# Patient Record
Sex: FEMALE | Race: WHITE | NOT HISPANIC OR LATINO | Employment: UNEMPLOYED | RURAL
[De-identification: names, ages, dates, MRNs, and addresses within clinical notes are randomized per-mention and may not be internally consistent; named-entity substitution may affect disease eponyms.]

---

## 2017-05-15 ENCOUNTER — HISTORICAL (OUTPATIENT)
Dept: ADMINISTRATIVE | Facility: HOSPITAL | Age: 22
End: 2017-05-15

## 2017-05-17 LAB
LAB AP GENERAL CAT - HISTORICAL: NORMAL
LAB AP INTERPRETATION/RESULT - HISTORICAL: NORMAL
LAB AP SPECIMEN ADEQUACY - HISTORICAL: NORMAL
LAB AP SPECIMEN SUBMITTED - HISTORICAL: NORMAL

## 2018-09-04 ENCOUNTER — HISTORICAL (OUTPATIENT)
Dept: ADMINISTRATIVE | Facility: HOSPITAL | Age: 23
End: 2018-09-04

## 2018-09-07 LAB
LAB AP GENERAL CAT - HISTORICAL: NORMAL
LAB AP INTERPRETATION/RESULT - HISTORICAL: NEGATIVE
LAB AP SPECIMEN ADEQUACY - HISTORICAL: NORMAL
LAB AP SPECIMEN SUBMITTED - HISTORICAL: NORMAL

## 2019-09-26 ENCOUNTER — HISTORICAL (OUTPATIENT)
Dept: ADMINISTRATIVE | Facility: HOSPITAL | Age: 24
End: 2019-09-26

## 2019-09-30 LAB
LAB AP CLINICAL INFORMATION: NORMAL
LAB AP COMMENTS: NORMAL
LAB AP GENERAL CAT - HISTORICAL: NORMAL
LAB AP INTERPRETATION/RESULT - HISTORICAL: NORMAL
LAB AP SPECIMEN ADEQUACY - HISTORICAL: NORMAL
LAB AP SPECIMEN SUBMITTED - HISTORICAL: NORMAL

## 2020-03-31 ENCOUNTER — HISTORICAL (OUTPATIENT)
Dept: ADMINISTRATIVE | Facility: HOSPITAL | Age: 25
End: 2020-03-31

## 2020-04-01 ENCOUNTER — HISTORICAL (OUTPATIENT)
Dept: ADMINISTRATIVE | Facility: HOSPITAL | Age: 25
End: 2020-04-01

## 2020-10-14 ENCOUNTER — HISTORICAL (OUTPATIENT)
Dept: ADMINISTRATIVE | Facility: HOSPITAL | Age: 25
End: 2020-10-14

## 2020-10-19 LAB
LAB AP CLINICAL INFORMATION: NORMAL
LAB AP GENERAL CAT - HISTORICAL: NORMAL
LAB AP INTERPRETATION/RESULT - HISTORICAL: NORMAL
LAB AP SPECIMEN ADEQUACY - HISTORICAL: NORMAL
LAB AP SPECIMEN SUBMITTED - HISTORICAL: NORMAL

## 2021-04-01 RX ORDER — NORELGESTROMIN AND ETHINYL ESTRADIOL 150; 35 UG/D; UG/D
1 PATCH TRANSDERMAL WEEKLY
COMMUNITY
Start: 2021-03-08 | End: 2021-04-08 | Stop reason: SDUPTHER

## 2021-04-01 RX ORDER — SERTRALINE HYDROCHLORIDE 100 MG/1
100 TABLET, FILM COATED ORAL DAILY
COMMUNITY
End: 2021-12-27 | Stop reason: SDUPTHER

## 2021-04-01 RX ORDER — TRAMADOL HYDROCHLORIDE 50 MG/1
50 TABLET ORAL EVERY 6 HOURS PRN
Status: ON HOLD | COMMUNITY
End: 2022-02-22 | Stop reason: CLARIF

## 2021-04-08 ENCOUNTER — HISTORICAL (OUTPATIENT)
Dept: ADMINISTRATIVE | Facility: HOSPITAL | Age: 26
End: 2021-04-08

## 2021-04-08 ENCOUNTER — OFFICE VISIT (OUTPATIENT)
Dept: OBSTETRICS AND GYNECOLOGY | Facility: CLINIC | Age: 26
End: 2021-04-08
Payer: MEDICAID

## 2021-04-08 VITALS
HEIGHT: 64 IN | TEMPERATURE: 98 F | SYSTOLIC BLOOD PRESSURE: 113 MMHG | BODY MASS INDEX: 22.79 KG/M2 | DIASTOLIC BLOOD PRESSURE: 86 MMHG | RESPIRATION RATE: 18 BRPM | WEIGHT: 133.5 LBS | HEART RATE: 91 BPM

## 2021-04-08 DIAGNOSIS — Z12.4 SCREENING FOR MALIGNANT NEOPLASM OF THE CERVIX: ICD-10-CM

## 2021-04-08 DIAGNOSIS — Z01.419 ENCOUNTER FOR WELL WOMAN EXAM WITH ROUTINE GYNECOLOGICAL EXAM: Primary | ICD-10-CM

## 2021-04-08 PROCEDURE — 88141 CYTOPATH C/V INTERPRET: CPT | Mod: ,,, | Performed by: PATHOLOGY

## 2021-04-08 PROCEDURE — 99213 OFFICE O/P EST LOW 20 MIN: CPT | Mod: PBBFAC | Performed by: OBSTETRICS & GYNECOLOGY

## 2021-04-08 PROCEDURE — 99395 PREV VISIT EST AGE 18-39: CPT | Mod: S$PBB,,, | Performed by: OBSTETRICS & GYNECOLOGY

## 2021-04-08 PROCEDURE — 99999 PR PBB SHADOW E&M-EST. PATIENT-LVL III: ICD-10-PCS | Mod: PBBFAC,,, | Performed by: OBSTETRICS & GYNECOLOGY

## 2021-04-08 PROCEDURE — 88142 CYTOPATH C/V THIN LAYER: CPT | Performed by: PATHOLOGY

## 2021-04-08 PROCEDURE — 88141 PR  CYTOPATH CERV/VAG INTERPRET: ICD-10-PCS | Mod: ,,, | Performed by: PATHOLOGY

## 2021-04-08 PROCEDURE — 99395 PR PREVENTIVE VISIT,EST,18-39: ICD-10-PCS | Mod: S$PBB,,, | Performed by: OBSTETRICS & GYNECOLOGY

## 2021-04-08 PROCEDURE — 99999 PR PBB SHADOW E&M-EST. PATIENT-LVL III: CPT | Mod: PBBFAC,,, | Performed by: OBSTETRICS & GYNECOLOGY

## 2021-04-08 RX ORDER — NORELGESTROMIN AND ETHINYL ESTRADIOL 150; 35 UG/D; UG/D
1 PATCH TRANSDERMAL WEEKLY
Qty: 3 PATCH | Refills: 11 | Status: SHIPPED | OUTPATIENT
Start: 2021-04-08 | End: 2021-10-28

## 2021-04-21 LAB
HPV 16: NEGATIVE
HPV 18: NEGATIVE
HPV OTHER: POSITIVE
INSULIN SERPL-ACNC: ABNORMAL U[IU]/ML

## 2021-04-22 ENCOUNTER — TELEPHONE (OUTPATIENT)
Dept: OBSTETRICS AND GYNECOLOGY | Facility: CLINIC | Age: 26
End: 2021-04-22

## 2021-07-15 ENCOUNTER — OFFICE VISIT (OUTPATIENT)
Dept: OBSTETRICS AND GYNECOLOGY | Facility: CLINIC | Age: 26
End: 2021-07-15
Payer: MEDICAID

## 2021-07-15 DIAGNOSIS — R39.89 BLADDER PAIN: ICD-10-CM

## 2021-07-15 DIAGNOSIS — R10.2 PELVIC PAIN IN FEMALE: ICD-10-CM

## 2021-07-15 PROCEDURE — 99213 OFFICE O/P EST LOW 20 MIN: CPT | Mod: S$PBB,,, | Performed by: OBSTETRICS & GYNECOLOGY

## 2021-07-15 PROCEDURE — 99212 OFFICE O/P EST SF 10 MIN: CPT | Mod: PBBFAC | Performed by: OBSTETRICS & GYNECOLOGY

## 2021-07-15 PROCEDURE — 99213 PR OFFICE/OUTPT VISIT, EST, LEVL III, 20-29 MIN: ICD-10-PCS | Mod: S$PBB,,, | Performed by: OBSTETRICS & GYNECOLOGY

## 2021-07-15 RX ORDER — NITROFURANTOIN 25; 75 MG/1; MG/1
100 CAPSULE ORAL 2 TIMES DAILY
Qty: 14 CAPSULE | Refills: 0 | Status: SHIPPED | OUTPATIENT
Start: 2021-07-15 | End: 2021-07-22

## 2021-07-15 RX ORDER — PHENAZOPYRIDINE HYDROCHLORIDE 200 MG/1
200 TABLET, FILM COATED ORAL 3 TIMES DAILY PRN
Qty: 20 TABLET | Refills: 0 | Status: ON HOLD | OUTPATIENT
Start: 2021-07-15 | End: 2022-02-22 | Stop reason: CLARIF

## 2021-08-02 ENCOUNTER — OFFICE VISIT (OUTPATIENT)
Dept: FAMILY MEDICINE | Facility: CLINIC | Age: 26
End: 2021-08-02
Payer: MEDICAID

## 2021-08-02 VITALS
TEMPERATURE: 100 F | SYSTOLIC BLOOD PRESSURE: 97 MMHG | DIASTOLIC BLOOD PRESSURE: 60 MMHG | WEIGHT: 133 LBS | RESPIRATION RATE: 16 BRPM | BODY MASS INDEX: 22.71 KG/M2 | OXYGEN SATURATION: 98 % | HEART RATE: 89 BPM | HEIGHT: 64 IN

## 2021-08-02 DIAGNOSIS — R05.9 COUGH: ICD-10-CM

## 2021-08-02 DIAGNOSIS — Z20.822 CONTACT WITH AND (SUSPECTED) EXPOSURE TO COVID-19: ICD-10-CM

## 2021-08-02 DIAGNOSIS — R50.9 FEVER, UNSPECIFIED FEVER CAUSE: ICD-10-CM

## 2021-08-02 DIAGNOSIS — J02.9 SORE THROAT: ICD-10-CM

## 2021-08-02 DIAGNOSIS — U07.1 COVID-19: Primary | ICD-10-CM

## 2021-08-02 LAB
CTP QC/QA: YES
CTP QC/QA: YES
FLUAV AG NPH QL: NEGATIVE
FLUBV AG NPH QL: NEGATIVE
S PYO RRNA THROAT QL PROBE: NEGATIVE
SARS-COV-2 AG RESP QL IA.RAPID: POSITIVE

## 2021-08-02 PROCEDURE — 87880 STREP A ASSAY W/OPTIC: CPT | Mod: RHCUB | Performed by: FAMILY MEDICINE

## 2021-08-02 PROCEDURE — 87428 SARSCOV & INF VIR A&B AG IA: CPT | Mod: RHCUB | Performed by: FAMILY MEDICINE

## 2021-08-02 PROCEDURE — 99213 OFFICE O/P EST LOW 20 MIN: CPT | Mod: ,,, | Performed by: FAMILY MEDICINE

## 2021-08-02 PROCEDURE — 99213 PR OFFICE/OUTPT VISIT, EST, LEVL III, 20-29 MIN: ICD-10-PCS | Mod: ,,, | Performed by: FAMILY MEDICINE

## 2021-08-02 RX ORDER — AZITHROMYCIN 250 MG/1
250 TABLET, FILM COATED ORAL DAILY
COMMUNITY
End: 2021-08-02 | Stop reason: SDUPTHER

## 2021-08-02 RX ORDER — AZITHROMYCIN 250 MG/1
TABLET, FILM COATED ORAL
Qty: 6 TABLET | Refills: 0 | Status: CANCELLED | OUTPATIENT
Start: 2021-08-02 | End: 2021-08-07

## 2021-08-03 RX ORDER — AZITHROMYCIN 250 MG/1
250 TABLET, FILM COATED ORAL DAILY
Qty: 6 TABLET | Refills: 0 | Status: ON HOLD | OUTPATIENT
Start: 2021-08-03 | End: 2022-02-22 | Stop reason: CLARIF

## 2021-08-16 ENCOUNTER — TELEPHONE (OUTPATIENT)
Dept: FAMILY MEDICINE | Facility: CLINIC | Age: 26
End: 2021-08-16

## 2021-10-28 ENCOUNTER — OFFICE VISIT (OUTPATIENT)
Dept: OBSTETRICS AND GYNECOLOGY | Facility: CLINIC | Age: 26
End: 2021-10-28
Payer: MEDICAID

## 2021-10-28 VITALS
DIASTOLIC BLOOD PRESSURE: 78 MMHG | TEMPERATURE: 98 F | BODY MASS INDEX: 23.9 KG/M2 | SYSTOLIC BLOOD PRESSURE: 118 MMHG | HEIGHT: 64 IN | WEIGHT: 140 LBS

## 2021-10-28 DIAGNOSIS — N92.1 MENOMETRORRHAGIA: ICD-10-CM

## 2021-10-28 DIAGNOSIS — N86 CERVICAL EROSION/ECTROPION: ICD-10-CM

## 2021-10-28 DIAGNOSIS — R10.2 PELVIC PAIN IN FEMALE: Primary | ICD-10-CM

## 2021-10-28 DIAGNOSIS — N93.0 PCB (POST COITAL BLEEDING): Primary | ICD-10-CM

## 2021-10-28 DIAGNOSIS — N93.9 EPISODE OF HEAVY VAGINAL BLEEDING: ICD-10-CM

## 2021-10-28 PROCEDURE — 88305 TISSUE EXAM BY PATHOLOGIST: CPT | Mod: 26,XU,, | Performed by: PATHOLOGY

## 2021-10-28 PROCEDURE — 58100 BIOPSY OF UTERUS LINING: CPT | Mod: PBBFAC | Performed by: OBSTETRICS & GYNECOLOGY

## 2021-10-28 PROCEDURE — 99213 OFFICE O/P EST LOW 20 MIN: CPT | Mod: S$PBB,25,, | Performed by: OBSTETRICS & GYNECOLOGY

## 2021-10-28 PROCEDURE — 58100 PR BIOPSY OF UTERUS LINING: ICD-10-PCS | Mod: S$PBB,,, | Performed by: OBSTETRICS & GYNECOLOGY

## 2021-10-28 PROCEDURE — 88305 SURGICAL PATHOLOGY: ICD-10-PCS | Mod: 26,,, | Performed by: PATHOLOGY

## 2021-10-28 PROCEDURE — 88305 TISSUE EXAM BY PATHOLOGIST: CPT | Mod: SUR | Performed by: OBSTETRICS & GYNECOLOGY

## 2021-10-28 PROCEDURE — 58100 BIOPSY OF UTERUS LINING: CPT | Mod: S$PBB,,, | Performed by: OBSTETRICS & GYNECOLOGY

## 2021-10-28 PROCEDURE — 57500 BIOPSY OF CERVIX: CPT | Mod: PBBFAC | Performed by: OBSTETRICS & GYNECOLOGY

## 2021-10-28 PROCEDURE — 88342 SURGICAL PATHOLOGY: ICD-10-PCS | Mod: 26,,, | Performed by: PATHOLOGY

## 2021-10-28 PROCEDURE — 99213 OFFICE O/P EST LOW 20 MIN: CPT | Mod: PBBFAC,25 | Performed by: OBSTETRICS & GYNECOLOGY

## 2021-10-28 PROCEDURE — 88342 IMHCHEM/IMCYTCHM 1ST ANTB: CPT | Mod: 26,,, | Performed by: PATHOLOGY

## 2021-10-28 PROCEDURE — 99213 PR OFFICE/OUTPT VISIT, EST, LEVL III, 20-29 MIN: ICD-10-PCS | Mod: S$PBB,25,, | Performed by: OBSTETRICS & GYNECOLOGY

## 2021-11-01 LAB
DHEA SERPL-MCNC: NORMAL
DHEA SERPL-MCNC: NORMAL
ESTROGEN SERPL-MCNC: NORMAL PG/ML
ESTROGEN SERPL-MCNC: NORMAL PG/ML
LAB AP CLINICAL INFORMATION: NORMAL
LAB AP CLINICAL INFORMATION: NORMAL
LAB AP GROSS DESCRIPTION: NORMAL
LAB AP GROSS DESCRIPTION: NORMAL
LAB AP LABORATORY NOTES: NORMAL
LAB AP LABORATORY NOTES: NORMAL
T3RU NFR SERPL: NORMAL %
T3RU NFR SERPL: NORMAL %

## 2021-11-04 ENCOUNTER — OFFICE VISIT (OUTPATIENT)
Dept: OBSTETRICS AND GYNECOLOGY | Facility: CLINIC | Age: 26
End: 2021-11-04
Payer: MEDICAID

## 2021-11-04 VITALS
WEIGHT: 141 LBS | DIASTOLIC BLOOD PRESSURE: 80 MMHG | HEIGHT: 64 IN | TEMPERATURE: 98 F | BODY MASS INDEX: 24.07 KG/M2 | SYSTOLIC BLOOD PRESSURE: 118 MMHG

## 2021-11-04 DIAGNOSIS — N86 ECTROPION OF CERVIX: ICD-10-CM

## 2021-11-04 DIAGNOSIS — N93.0 POSTCOITAL AND CONTACT BLEEDING: Primary | ICD-10-CM

## 2021-11-04 DIAGNOSIS — Z09 FOLLOW-UP VISIT AFTER COMPLETION OF TREATMENT: ICD-10-CM

## 2021-11-04 PROCEDURE — 99213 OFFICE O/P EST LOW 20 MIN: CPT | Mod: PBBFAC | Performed by: OBSTETRICS & GYNECOLOGY

## 2021-11-04 PROCEDURE — 99212 PR OFFICE/OUTPT VISIT, EST, LEVL II, 10-19 MIN: ICD-10-PCS | Mod: S$PBB,,, | Performed by: OBSTETRICS & GYNECOLOGY

## 2021-11-04 PROCEDURE — 99212 OFFICE O/P EST SF 10 MIN: CPT | Mod: S$PBB,,, | Performed by: OBSTETRICS & GYNECOLOGY

## 2021-12-22 RX ORDER — PROGESTERONE 200 MG/1
200 CAPSULE ORAL NIGHTLY
Qty: 12 CAPSULE | Refills: 2 | Status: ON HOLD | OUTPATIENT
Start: 2021-12-22 | End: 2022-02-22 | Stop reason: CLARIF

## 2021-12-27 RX ORDER — SERTRALINE HYDROCHLORIDE 100 MG/1
100 TABLET, FILM COATED ORAL DAILY
Qty: 90 TABLET | Refills: 3 | Status: ON HOLD | OUTPATIENT
Start: 2021-12-27 | End: 2022-09-13 | Stop reason: HOSPADM

## 2022-01-23 ENCOUNTER — HOSPITAL ENCOUNTER (EMERGENCY)
Facility: HOSPITAL | Age: 27
Discharge: HOME | End: 2022-01-23
Payer: MEDICAID

## 2022-01-23 VITALS
HEART RATE: 87 BPM | BODY MASS INDEX: 25.1 KG/M2 | SYSTOLIC BLOOD PRESSURE: 115 MMHG | OXYGEN SATURATION: 99 % | RESPIRATION RATE: 20 BRPM | HEIGHT: 64 IN | WEIGHT: 147 LBS | DIASTOLIC BLOOD PRESSURE: 58 MMHG | TEMPERATURE: 98 F

## 2022-01-23 DIAGNOSIS — N93.8 DYSFUNCTIONAL UTERINE BLEEDING: Primary | ICD-10-CM

## 2022-01-23 DIAGNOSIS — N96 HISTORY OF RECURRENT MISCARRIAGES: ICD-10-CM

## 2022-01-23 LAB
ANION GAP SERPL CALCULATED.3IONS-SCNC: 16 MMOL/L (ref 7–16)
B-HCG UR QL: NEGATIVE
BACTERIA #/AREA URNS HPF: ABNORMAL /HPF
BASOPHILS # BLD AUTO: 0.02 K/UL (ref 0–0.2)
BASOPHILS NFR BLD AUTO: 0.2 % (ref 0–1)
BILIRUB UR QL STRIP: NEGATIVE
BUN SERPL-MCNC: 11 MG/DL (ref 7–18)
BUN/CREAT SERPL: 13 (ref 6–20)
CALCIUM SERPL-MCNC: 8.5 MG/DL (ref 8.5–10.1)
CHLORIDE SERPL-SCNC: 108 MMOL/L (ref 98–107)
CLARITY UR: ABNORMAL
CO2 SERPL-SCNC: 25 MMOL/L (ref 21–32)
COLOR UR: ABNORMAL
CREAT SERPL-MCNC: 0.87 MG/DL (ref 0.55–1.02)
CTP QC/QA: YES
DIFFERENTIAL METHOD BLD: ABNORMAL
EOSINOPHIL # BLD AUTO: 0.11 K/UL (ref 0–0.5)
EOSINOPHIL NFR BLD AUTO: 1.3 % (ref 1–4)
EOSINOPHIL NFR BLD MANUAL: 2 % (ref 1–4)
ERYTHROCYTE [DISTWIDTH] IN BLOOD BY AUTOMATED COUNT: 11.8 % (ref 11.5–14.5)
GLUCOSE SERPL-MCNC: 99 MG/DL (ref 74–106)
GLUCOSE UR STRIP-MCNC: NEGATIVE MG/DL
HCG SERPL-ACNC: <1 MIU/ML
HCT VFR BLD AUTO: 39.6 % (ref 38–47)
HGB BLD-MCNC: 13.2 G/DL (ref 12–16)
IMM GRANULOCYTES # BLD AUTO: 0.03 K/UL (ref 0–0.04)
IMM GRANULOCYTES NFR BLD: 0.3 % (ref 0–0.4)
KETONES UR STRIP-SCNC: NEGATIVE MG/DL
LEUKOCYTE ESTERASE UR QL STRIP: NEGATIVE
LYMPHOCYTES # BLD AUTO: 0.45 K/UL (ref 1–4.8)
LYMPHOCYTES NFR BLD AUTO: 5.2 % (ref 27–41)
LYMPHOCYTES NFR BLD MANUAL: 6 % (ref 27–41)
MCH RBC QN AUTO: 29.9 PG (ref 27–31)
MCHC RBC AUTO-ENTMCNC: 33.3 G/DL (ref 32–36)
MCV RBC AUTO: 89.8 FL (ref 80–96)
MONOCYTES # BLD AUTO: 0.28 K/UL (ref 0–0.8)
MONOCYTES NFR BLD AUTO: 3.2 % (ref 2–6)
MONOCYTES NFR BLD MANUAL: 4 % (ref 2–6)
MPC BLD CALC-MCNC: 10.1 FL (ref 9.4–12.4)
MUCOUS THREADS #/AREA URNS HPF: ABNORMAL /HPF
NEUTROPHILS # BLD AUTO: 7.73 K/UL (ref 1.8–7.7)
NEUTROPHILS NFR BLD AUTO: 89.8 % (ref 53–65)
NEUTS BAND NFR BLD MANUAL: 8 % (ref 1–5)
NEUTS SEG NFR BLD MANUAL: 80 % (ref 50–62)
NITRITE UR QL STRIP: NEGATIVE
NRBC # BLD AUTO: 0 X10E3/UL
NRBC, AUTO (.00): 0 %
PH UR STRIP: 6 PH UNITS
PLATELET # BLD AUTO: 213 K/UL (ref 150–400)
PLATELET MORPHOLOGY: NORMAL
POTASSIUM SERPL-SCNC: 3.9 MMOL/L (ref 3.5–5.1)
PROT UR QL STRIP: NEGATIVE
RBC # BLD AUTO: 4.41 M/UL (ref 4.2–5.4)
RBC # UR STRIP: ABNORMAL /UL
RBC #/AREA URNS HPF: ABNORMAL /HPF
RBC MORPH BLD: NORMAL
SODIUM SERPL-SCNC: 145 MMOL/L (ref 136–145)
SP GR UR STRIP: >=1.03
SQUAMOUS #/AREA URNS LPF: ABNORMAL /LPF
UROBILINOGEN UR STRIP-ACNC: 0.2 MG/DL
WBC # BLD AUTO: 8.62 K/UL (ref 4.5–11)
WBC #/AREA URNS HPF: ABNORMAL /HPF

## 2022-01-23 PROCEDURE — 36415 COLL VENOUS BLD VENIPUNCTURE: CPT | Performed by: NURSE PRACTITIONER

## 2022-01-23 PROCEDURE — 96375 TX/PRO/DX INJ NEW DRUG ADDON: CPT

## 2022-01-23 PROCEDURE — 96361 HYDRATE IV INFUSION ADD-ON: CPT

## 2022-01-23 PROCEDURE — 99283 EMERGENCY DEPT VISIT LOW MDM: CPT | Mod: ,,, | Performed by: NURSE PRACTITIONER

## 2022-01-23 PROCEDURE — 84702 CHORIONIC GONADOTROPIN TEST: CPT | Performed by: NURSE PRACTITIONER

## 2022-01-23 PROCEDURE — 63600175 PHARM REV CODE 636 W HCPCS: Performed by: NURSE PRACTITIONER

## 2022-01-23 PROCEDURE — 99283 PR EMERGENCY DEPT VISIT,LEVEL III: ICD-10-PCS | Mod: ,,, | Performed by: NURSE PRACTITIONER

## 2022-01-23 PROCEDURE — 25000003 PHARM REV CODE 250: Performed by: NURSE PRACTITIONER

## 2022-01-23 PROCEDURE — 96374 THER/PROPH/DIAG INJ IV PUSH: CPT

## 2022-01-23 PROCEDURE — 85025 COMPLETE CBC W/AUTO DIFF WBC: CPT | Performed by: NURSE PRACTITIONER

## 2022-01-23 PROCEDURE — 81025 URINE PREGNANCY TEST: CPT | Performed by: NURSE PRACTITIONER

## 2022-01-23 PROCEDURE — 81001 URINALYSIS AUTO W/SCOPE: CPT | Performed by: NURSE PRACTITIONER

## 2022-01-23 PROCEDURE — 80048 BASIC METABOLIC PNL TOTAL CA: CPT | Performed by: NURSE PRACTITIONER

## 2022-01-23 PROCEDURE — 99284 EMERGENCY DEPT VISIT MOD MDM: CPT | Mod: 25

## 2022-01-23 RX ORDER — ONDANSETRON 2 MG/ML
4 INJECTION INTRAMUSCULAR; INTRAVENOUS
Status: COMPLETED | OUTPATIENT
Start: 2022-01-23 | End: 2022-01-23

## 2022-01-23 RX ORDER — IBUPROFEN 800 MG/1
800 TABLET ORAL 3 TIMES DAILY
Qty: 20 TABLET | Refills: 0 | Status: ON HOLD | OUTPATIENT
Start: 2022-01-23 | End: 2022-02-22 | Stop reason: CLARIF

## 2022-01-23 RX ORDER — KETOROLAC TROMETHAMINE 30 MG/ML
30 INJECTION, SOLUTION INTRAMUSCULAR; INTRAVENOUS
Status: COMPLETED | OUTPATIENT
Start: 2022-01-23 | End: 2022-01-23

## 2022-01-23 RX ADMIN — ONDANSETRON 4 MG: 2 INJECTION INTRAMUSCULAR; INTRAVENOUS at 12:01

## 2022-01-23 RX ADMIN — KETOROLAC TROMETHAMINE 30 MG: 30 INJECTION, SOLUTION INTRAMUSCULAR at 01:01

## 2022-01-23 RX ADMIN — SODIUM CHLORIDE 1000 ML: 9 INJECTION, SOLUTION INTRAVENOUS at 12:01

## 2022-01-23 NOTE — DISCHARGE INSTRUCTIONS
Take medication as prescribed.   Call Dr Ding's office to schedule follow up appointment and to establish care.   Pelvic rest until follow up with OB/ GYN.  Return to ER with new or worsening symptoms.

## 2022-01-24 ENCOUNTER — OFFICE VISIT (OUTPATIENT)
Dept: FAMILY MEDICINE | Facility: CLINIC | Age: 27
End: 2022-01-24
Payer: MEDICAID

## 2022-01-24 VITALS
BODY MASS INDEX: 25.87 KG/M2 | OXYGEN SATURATION: 100 % | TEMPERATURE: 97 F | HEIGHT: 63 IN | WEIGHT: 146 LBS | HEART RATE: 89 BPM | RESPIRATION RATE: 18 BRPM | DIASTOLIC BLOOD PRESSURE: 58 MMHG | SYSTOLIC BLOOD PRESSURE: 97 MMHG

## 2022-01-24 DIAGNOSIS — J10.1 INFLUENZA B: Primary | ICD-10-CM

## 2022-01-24 DIAGNOSIS — R50.9 FEVER, UNSPECIFIED FEVER CAUSE: ICD-10-CM

## 2022-01-24 LAB
CTP QC/QA: YES
CTP QC/QA: YES
FLUAV AG NPH QL: NEGATIVE
FLUBV AG NPH QL: POSITIVE
S PYO RRNA THROAT QL PROBE: NEGATIVE

## 2022-01-24 PROCEDURE — 3078F DIAST BP <80 MM HG: CPT | Mod: CPTII,,, | Performed by: FAMILY MEDICINE

## 2022-01-24 PROCEDURE — 3078F PR MOST RECENT DIASTOLIC BLOOD PRESSURE < 80 MM HG: ICD-10-PCS | Mod: CPTII,,, | Performed by: FAMILY MEDICINE

## 2022-01-24 PROCEDURE — 99213 PR OFFICE/OUTPT VISIT, EST, LEVL III, 20-29 MIN: ICD-10-PCS | Mod: ,,, | Performed by: FAMILY MEDICINE

## 2022-01-24 PROCEDURE — 99213 OFFICE O/P EST LOW 20 MIN: CPT | Mod: ,,, | Performed by: FAMILY MEDICINE

## 2022-01-24 PROCEDURE — 1160F RVW MEDS BY RX/DR IN RCRD: CPT | Mod: CPTII,,, | Performed by: FAMILY MEDICINE

## 2022-01-24 PROCEDURE — 3008F PR BODY MASS INDEX (BMI) DOCUMENTED: ICD-10-PCS | Mod: CPTII,,, | Performed by: FAMILY MEDICINE

## 2022-01-24 PROCEDURE — 1159F PR MEDICATION LIST DOCUMENTED IN MEDICAL RECORD: ICD-10-PCS | Mod: CPTII,,, | Performed by: FAMILY MEDICINE

## 2022-01-24 PROCEDURE — 3074F PR MOST RECENT SYSTOLIC BLOOD PRESSURE < 130 MM HG: ICD-10-PCS | Mod: CPTII,,, | Performed by: FAMILY MEDICINE

## 2022-01-24 PROCEDURE — 87880 STREP A ASSAY W/OPTIC: CPT | Mod: RHCUB | Performed by: FAMILY MEDICINE

## 2022-01-24 PROCEDURE — 1160F PR REVIEW ALL MEDS BY PRESCRIBER/CLIN PHARMACIST DOCUMENTED: ICD-10-PCS | Mod: CPTII,,, | Performed by: FAMILY MEDICINE

## 2022-01-24 PROCEDURE — 1159F MED LIST DOCD IN RCRD: CPT | Mod: CPTII,,, | Performed by: FAMILY MEDICINE

## 2022-01-24 PROCEDURE — 87804 INFLUENZA ASSAY W/OPTIC: CPT | Mod: RHCUB | Performed by: FAMILY MEDICINE

## 2022-01-24 PROCEDURE — 3008F BODY MASS INDEX DOCD: CPT | Mod: CPTII,,, | Performed by: FAMILY MEDICINE

## 2022-01-24 PROCEDURE — 3074F SYST BP LT 130 MM HG: CPT | Mod: CPTII,,, | Performed by: FAMILY MEDICINE

## 2022-01-24 RX ORDER — OSELTAMIVIR PHOSPHATE 75 MG/1
75 CAPSULE ORAL 2 TIMES DAILY
Qty: 10 CAPSULE | Refills: 0 | Status: SHIPPED | OUTPATIENT
Start: 2022-01-24 | End: 2022-01-29

## 2022-01-24 NOTE — LETTER
January 24, 2022    Rebekah Patterson  [id number]  [place] [address]         [address] [place] [address]  Phone: [phone]  Fax: [phone] January 24, 2022     Patient: Rebekah Patterson   YOB: 1995   Date of Visit: 1/24/2022       To Whom It May Concern:    It is my medical opinion that Rebekah Patterson should remain off work from 01/24/2022-01/30/20222 and may return to work on 01/31/2022  Positive for Influenza B.    If you have any questions or concerns, please don't hesitate to call.    Sincerely,    Moriah Harvey MD

## 2022-02-02 ENCOUNTER — OFFICE VISIT (OUTPATIENT)
Dept: OBSTETRICS AND GYNECOLOGY | Facility: CLINIC | Age: 27
End: 2022-02-02
Payer: MEDICAID

## 2022-02-02 VITALS
HEIGHT: 63 IN | DIASTOLIC BLOOD PRESSURE: 70 MMHG | WEIGHT: 144.19 LBS | SYSTOLIC BLOOD PRESSURE: 110 MMHG | RESPIRATION RATE: 15 BRPM | BODY MASS INDEX: 25.55 KG/M2

## 2022-02-02 DIAGNOSIS — N93.9 ABNORMAL UTERINE BLEEDING: Primary | ICD-10-CM

## 2022-02-02 DIAGNOSIS — N96 HISTORY OF RECURRENT MISCARRIAGES: ICD-10-CM

## 2022-02-02 DIAGNOSIS — R10.2 PELVIC PAIN: ICD-10-CM

## 2022-02-02 PROCEDURE — 3078F DIAST BP <80 MM HG: CPT | Mod: CPTII,,, | Performed by: STUDENT IN AN ORGANIZED HEALTH CARE EDUCATION/TRAINING PROGRAM

## 2022-02-02 PROCEDURE — 86148 ANTI-PHOSPHOLIPID ANTIBODY: CPT | Mod: 90,,, | Performed by: CLINICAL MEDICAL LABORATORY

## 2022-02-02 PROCEDURE — 99203 PR OFFICE/OUTPT VISIT, NEW, LEVL III, 30-44 MIN: ICD-10-PCS | Mod: S$PBB,,, | Performed by: STUDENT IN AN ORGANIZED HEALTH CARE EDUCATION/TRAINING PROGRAM

## 2022-02-02 PROCEDURE — 1159F PR MEDICATION LIST DOCUMENTED IN MEDICAL RECORD: ICD-10-PCS | Mod: CPTII,,, | Performed by: STUDENT IN AN ORGANIZED HEALTH CARE EDUCATION/TRAINING PROGRAM

## 2022-02-02 PROCEDURE — 3078F PR MOST RECENT DIASTOLIC BLOOD PRESSURE < 80 MM HG: ICD-10-PCS | Mod: CPTII,,, | Performed by: STUDENT IN AN ORGANIZED HEALTH CARE EDUCATION/TRAINING PROGRAM

## 2022-02-02 PROCEDURE — 99203 OFFICE O/P NEW LOW 30 MIN: CPT | Mod: S$PBB,,, | Performed by: STUDENT IN AN ORGANIZED HEALTH CARE EDUCATION/TRAINING PROGRAM

## 2022-02-02 PROCEDURE — 3008F PR BODY MASS INDEX (BMI) DOCUMENTED: ICD-10-PCS | Mod: CPTII,,, | Performed by: STUDENT IN AN ORGANIZED HEALTH CARE EDUCATION/TRAINING PROGRAM

## 2022-02-02 PROCEDURE — 86148 HIGH SPEC ANTIPHOSPHOLIPID AB (IGG,IGM): ICD-10-PCS | Mod: 90,,, | Performed by: CLINICAL MEDICAL LABORATORY

## 2022-02-02 PROCEDURE — 36415 PR COLLECTION VENOUS BLOOD,VENIPUNCTURE: ICD-10-PCS | Mod: ,,, | Performed by: CLINICAL MEDICAL LABORATORY

## 2022-02-02 PROCEDURE — 36415 COLL VENOUS BLD VENIPUNCTURE: CPT | Mod: ,,, | Performed by: CLINICAL MEDICAL LABORATORY

## 2022-02-02 PROCEDURE — 3074F SYST BP LT 130 MM HG: CPT | Mod: CPTII,,, | Performed by: STUDENT IN AN ORGANIZED HEALTH CARE EDUCATION/TRAINING PROGRAM

## 2022-02-02 PROCEDURE — 99214 OFFICE O/P EST MOD 30 MIN: CPT | Mod: PBBFAC | Performed by: STUDENT IN AN ORGANIZED HEALTH CARE EDUCATION/TRAINING PROGRAM

## 2022-02-02 PROCEDURE — 1159F MED LIST DOCD IN RCRD: CPT | Mod: CPTII,,, | Performed by: STUDENT IN AN ORGANIZED HEALTH CARE EDUCATION/TRAINING PROGRAM

## 2022-02-02 PROCEDURE — 3008F BODY MASS INDEX DOCD: CPT | Mod: CPTII,,, | Performed by: STUDENT IN AN ORGANIZED HEALTH CARE EDUCATION/TRAINING PROGRAM

## 2022-02-02 PROCEDURE — 3074F PR MOST RECENT SYSTOLIC BLOOD PRESSURE < 130 MM HG: ICD-10-PCS | Mod: CPTII,,, | Performed by: STUDENT IN AN ORGANIZED HEALTH CARE EDUCATION/TRAINING PROGRAM

## 2022-02-02 NOTE — PROGRESS NOTES
Gynecology History and Physical    Assessment/Plan:   Problem List Items Addressed This Visit        Renal/    Abnormal uterine bleeding - Primary    Overview     Reports miscarriage in December  Period in January was heavy, passed apple size clots  Denies previous heavy periods  Desires pregnancy  Pelvic US ordered  Consider hysteroscopy for AUB         Relevant Orders    US Pelvis Comp with Transvag NON-OB (xpd       GI    Pelvic pain    Overview     Reports dysmenorrhea, dyspareunia  Localizes more in LLQ  Pelvic US ordered  If hysteroscopy planned, consider diagnostic laparoscopy             Obstetric    History of recurrent miscarriages    Overview     Miscarriage in December  States she has now had 4 miscarriages  Has previously seen Dr. Naylor with [facility] for assistance  Unable to afford [facility] at this time due to medicaid coverage  Desires one more pregnancy  APS labs ordered  Consider ASA while trying to conceive         Relevant Orders    Lupus Anticoagulant Eval w/Reflex    Beta-2 Glycoprotein I Ab (IgG/IgM)    Misc Sendout Test, Blood anticardiolipin antibodies IgG, IgM    Phospholip Ab (Cardiolip) IgM/IgG            CC:   Chief Complaint   Patient presents with    Vaginal Bleeding     miscarried in December  and has bleeding after intercourse  Desires to get pregnant      HPI: Rebekah Patterson is a 26 y.o. female who presents with complaints of heavy period in January after a miscarriage in December.      Review of Systems: The following ROS was otherwise negative, except as noted in the HPI:  constitutional, HEENT, respiratory, cardiovascular, gastrointestinal, genitourinary, skin, musculoskeletal, neurological, psych    Gynecologic History:   denies history of abnormal pap smears  denies history of of STIs  Menstrual history:       Obstetrical History:  OB History        6    Para   2    Term   2            AB   4    Living   2       SAB   4    IAB        Ectopic        Multiple         "Live Births   2                 Past Medical History:   Past Medical History:   Diagnosis Date    Mental disorder        Medications:  Medication List with Changes/Refills   Current Medications    AZITHROMYCIN (Z-SULEIMAN) 250 MG TABLET    Take 1 tablet (250 mg total) by mouth once daily. Take two tabs day one then one tab daily days 2-5    BROMPHENIRAMIN-PHENYLEPHRIN-DM (RYNEX DM) 1-2.5-5 MG/5 ML SOLN    Take 10 mLs by mouth every 4 (four) hours as needed.    IBUPROFEN (ADVIL,MOTRIN) 800 MG TABLET    Take 1 tablet (800 mg total) by mouth 3 (three) times daily.    PHENAZOPYRIDINE (PYRIDIUM) 200 MG TABLET    Take 1 tablet (200 mg total) by mouth 3 (three) times daily as needed for Pain.    PROGESTERONE (PROMETRIUM) 200 MG CAPSULE    Take 1 capsule (200 mg total) by mouth nightly.    SERTRALINE (ZOLOFT) 100 MG TABLET    Take 1 tablet (100 mg total) by mouth once daily.    TRAMADOL (ULTRAM) 50 MG TABLET    Take 50 mg by mouth every 6 (six) hours as needed for Pain.       Allergies:  Macrobid [nitrofurantoin monohyd/m-cryst] and Pcn [penicillins]    Surgical History:  Past Surgical History:   Procedure Laterality Date    miscarriage      x 4     TONSILLECTOMY      TYMPANOPLASTY      x4        Family History:  Family History   Problem Relation Age of Onset    Stroke Mother        Social History:  Social History     Substance and Sexual Activity   Alcohol Use Never     Social History     Substance and Sexual Activity   Drug Use Never     Social History     Tobacco Use   Smoking Status Current Every Day Smoker    Types: Vaping with nicotine   Smokeless Tobacco Never Used       Physical Exam:  /70 (BP Location: Left arm, Patient Position: Sitting)   Resp 15   Ht 5' 3" (1.6 m)   Wt 65.4 kg (144 lb 3.2 oz)   LMP 01/21/2022   BMI 25.54 kg/m²     General: Alert, well appearing, no acute distress  Head: Normocephalic, atraumatic  Lungs: Unlabored respirations  Abdomen: Soft, nontender, nondistended   Pelvic: " deferred  Extremities: No redness or tenderness  Skin: Well perfused, normal coloration and turgor, no lesions or rashes visualized  Neuro: Alert, oriented, normal speech, no focal deficits, moves extremities appropriately  Osteopathic: No TART changes    Labs:  No visits with results within 1 Day(s) from this visit.   Latest known visit with results is:   Office Visit on 01/24/2022   Component Date Value    Rapid Strep A Screen 01/24/2022 Negative      Acceptab* 01/24/2022 Yes     Rapid Influenza A Ag 01/24/2022 Negative     Rapid Influenza B Ag 01/24/2022 Positive*     Acceptab* 01/24/2022 Yes

## 2022-02-02 NOTE — PATIENT INSTRUCTIONS
"Patient Education       Repeated Miscarriage   The Basics   Written by the doctors and editors at [facility]   What is repeated miscarriage? -- A miscarriage, or "pregnancy loss" is when a pregnancy ends on its own before 20 weeks. "Repeated miscarriage" is the medical term for when a person has had 2 or more miscarriages.   What causes repeated miscarriage? -- In some cases, repeated miscarriage is caused by:  · Chromosome problems in the fetus - A "fetus" is what a baby is called while it is growing inside you. Chromosomes are structures in cells that contain thousands of genes. Chromosome problems usually happen by chance. But sometimes they happen because a parent has a chromosome problem. The risk of chromosome problems also increases the older you get.  · Conditions in which something changes the shape of the inside of your uterus (figure 1)  · An autoimmune condition - When you have an autoimmune condition, your immune system (infection-fighting system) attacks healthy tissue in your body instead of infections.  · Other medical conditions - If you have certain medical problems, this can be a cause of repeated miscarriages. Examples include diabetes, thyroid disease, blood clotting problems, or hormone problems.  In many cases, though, doctors don't know why repeated miscarriage happens.  Why am I having repeated miscarriage? -- To figure out why you are having repeated miscarriage, your doctor or nurse will:  · Talk with you and ask about your past pregnancies, monthly periods, and medical conditions  · Do an exam, including a pelvic exam  · Do blood tests - These include tests to check your immune system, blood clotting system, and hormone levels. They also include tests to check for certain medical conditions, such as thyroid disease or diabetes.  · Do an imaging test to check your uterus - Your doctor can do different types of imaging tests to check your uterus. One common test is a special type of " ultrasound. (An ultrasound uses sound waves to create images of the inside of the body.)  · Do a test to look inside your uterus - This involves having a thin tube with a camera and light on the end put into your vagina and up into your uterus.  · Do chromosome tests on you and the baby's father - Before and after the testing, you will talk with a genetic counselor. This is a person who specializes in genetic problems.  You might have other tests, too, depending on your individual situation.  How is repeated miscarriage treated? -- If your doctor or nurse finds a possible cause that can be treated, they will treat it. Treatment might help improve your chances of having a successful pregnancy.  For example, problems in the uterus can sometimes be treated with surgery. Certain medical, immune, or hormone problems can sometimes be treated with medicines.  Is there anything I can do on my own to avoid another miscarriage? -- There is no sure way to avoid having another miscarriage. But you might be able to lower your chances of having one by avoiding cigarettes, alcohol, caffeine, and other drugs or medicines not prescribed by a doctor or nurse. You can also try to stay at a healthy weight and start taking folic acid supplements before you get pregnant.   Will I be able to have a baby someday? -- Most people who have repeated miscarriage are able to have a baby in the future.  Let your doctor or nurse know when you are planning to become pregnant and then as soon as you think you are pregnant. Your doctor or nurse will be able to monitor your pregnancy to make sure it is developing normally.  How can I get support if I'm having a hard time coping? -- Having repeated miscarriage can be very tough. If you are having a hard time coping, talk to your doctor or nurse. They can help you find a counselor to talk to.  Some people also find it helpful to go to a support group for people who are in the same situation.  All topics  are updated as new evidence becomes available and our peer review process is complete.  This topic retrieved from [employer] on: Sep 21, 2021.  Topic 92313 Version 9.0  Release: 29.4.2 - C29.263  © 2021 UpToDate, Inc. and/or its affiliates. All rights reserved.  figure 1: Female reproductive anatomy     These are the internal organs that make up the female reproductive system.  Graphic 77739 Version 6.0    Consumer Information Use and Disclaimer   This information is not specific medical advice and does not replace information you receive from your health care provider. This is only a brief summary of general information. It does NOT include all information about conditions, illnesses, injuries, tests, procedures, treatments, therapies, discharge instructions or life-style choices that may apply to you. You must talk with your health care provider for complete information about your health and treatment options. This information should not be used to decide whether or not to accept your health care provider's advice, instructions or recommendations. Only your health care provider has the knowledge and training to provide advice that is right for you. The use of this information is governed by the [employer] End User License Agreement, available at https://www.[employer]/en/solutions/[employer]/about/adis.The use of [employer] content is governed by the [employer] Terms of Use. ©2021 UpToDate, Inc. All rights reserved.  Copyright   © 2021 UpToDate, Inc. and/or its affiliates. All rights reserved.

## 2022-02-07 LAB
CARDIOLIPIN IGG SER IA-ACNC: <9.4 GPL
CARDIOLIPIN IGM SER IA-ACNC: <9.4 MPL

## 2022-02-14 ENCOUNTER — HOSPITAL ENCOUNTER (OUTPATIENT)
Dept: RADIOLOGY | Facility: HOSPITAL | Age: 27
Discharge: HOME | End: 2022-02-14
Attending: STUDENT IN AN ORGANIZED HEALTH CARE EDUCATION/TRAINING PROGRAM
Payer: MEDICAID

## 2022-02-14 ENCOUNTER — OFFICE VISIT (OUTPATIENT)
Dept: OBSTETRICS AND GYNECOLOGY | Facility: CLINIC | Age: 27
End: 2022-02-14
Payer: MEDICAID

## 2022-02-14 DIAGNOSIS — R10.2 PELVIC PAIN: ICD-10-CM

## 2022-02-14 DIAGNOSIS — N93.9 ABNORMAL UTERINE BLEEDING: Primary | ICD-10-CM

## 2022-02-14 DIAGNOSIS — N93.9 ABNORMAL UTERINE BLEEDING: ICD-10-CM

## 2022-02-14 DIAGNOSIS — N91.2 AMENORRHEA: ICD-10-CM

## 2022-02-14 LAB
B-HCG UR QL: NEGATIVE
CTP QC/QA: YES

## 2022-02-14 PROCEDURE — 76856 US EXAM PELVIC COMPLETE: CPT | Mod: TC

## 2022-02-14 PROCEDURE — 99213 OFFICE O/P EST LOW 20 MIN: CPT | Mod: S$PBB,,, | Performed by: STUDENT IN AN ORGANIZED HEALTH CARE EDUCATION/TRAINING PROGRAM

## 2022-02-14 PROCEDURE — 81025 URINE PREGNANCY TEST: CPT | Mod: PBBFAC | Performed by: STUDENT IN AN ORGANIZED HEALTH CARE EDUCATION/TRAINING PROGRAM

## 2022-02-14 PROCEDURE — 99213 PR OFFICE/OUTPT VISIT, EST, LEVL III, 20-29 MIN: ICD-10-PCS | Mod: S$PBB,,, | Performed by: STUDENT IN AN ORGANIZED HEALTH CARE EDUCATION/TRAINING PROGRAM

## 2022-02-14 PROCEDURE — 76856 US EXAM PELVIC COMPLETE: CPT | Mod: 26,,, | Performed by: RADIOLOGY

## 2022-02-14 PROCEDURE — 76856 US PELVIS COMPLETE NON OB: ICD-10-PCS | Mod: 26,,, | Performed by: RADIOLOGY

## 2022-02-14 PROCEDURE — 99213 OFFICE O/P EST LOW 20 MIN: CPT | Mod: PBBFAC,25 | Performed by: STUDENT IN AN ORGANIZED HEALTH CARE EDUCATION/TRAINING PROGRAM

## 2022-02-14 NOTE — H&P (VIEW-ONLY)
Return Gyn Office Visit    Assessment/Plan  Problem List Items Addressed This Visit        Renal/    Abnormal uterine bleeding - Primary    Overview     Reports miscarriage in December  Period in January was heavy, passed apple size clots  Denies previous heavy periods  Desires pregnancy  Pelvic US without pathology  Will plan for intraop evaluation with hysteroscopy, possible D&C on 2/22         Relevant Orders    Case Request Operating Room: HYSTEROSCOPY, DIAGNOSTIC LAPAROSCOPY, CHROMOTUBATION (Completed)       GI    Pelvic pain    Overview     Reports dysmenorrhea, dyspareunia  Localizes more in LLQ  Pelvic US without pathology  If hysteroscopy planned, consider diagnostic laparoscopy          Relevant Orders    Case Request Operating Room: HYSTEROSCOPY, DIAGNOSTIC LAPAROSCOPY, CHROMOTUBATION (Completed)      Other Visit Diagnoses     Amenorrhea        Relevant Orders    POCT urine pregnancy (Completed)            CC: No chief complaint on file.    HPI:  26 y.o. who presents to office for US results.    Review of Systems - The following ROS was otherwise negative, except as noted in the HPI:  constitutional, respiratory, cardiovascular, gastrointestinal, genitourinary    Objective:  LMP 01/21/2022   General: Alert, well appearing, no acute distress  Abdomen: Soft, nontender, nondistended   Pelvic: deferred  Extremities: No redness or tenderness, neg Tim's sign  Osteopathic: no TART changes

## 2022-02-21 ENCOUNTER — OFFICE VISIT (OUTPATIENT)
Dept: OBSTETRICS AND GYNECOLOGY | Facility: CLINIC | Age: 27
End: 2022-02-21
Payer: MEDICAID

## 2022-02-21 DIAGNOSIS — Z01.818 PRE-PROCEDURAL EXAMINATION: Primary | ICD-10-CM

## 2022-02-21 PROCEDURE — 1159F MED LIST DOCD IN RCRD: CPT | Mod: CPTII,,, | Performed by: STUDENT IN AN ORGANIZED HEALTH CARE EDUCATION/TRAINING PROGRAM

## 2022-02-21 PROCEDURE — 1159F PR MEDICATION LIST DOCUMENTED IN MEDICAL RECORD: ICD-10-PCS | Mod: CPTII,,, | Performed by: STUDENT IN AN ORGANIZED HEALTH CARE EDUCATION/TRAINING PROGRAM

## 2022-02-21 PROCEDURE — 99499 UNLISTED E&M SERVICE: CPT | Mod: S$PBB,,, | Performed by: STUDENT IN AN ORGANIZED HEALTH CARE EDUCATION/TRAINING PROGRAM

## 2022-02-21 PROCEDURE — 99499 NO LOS: ICD-10-PCS | Mod: S$PBB,,, | Performed by: STUDENT IN AN ORGANIZED HEALTH CARE EDUCATION/TRAINING PROGRAM

## 2022-02-21 PROCEDURE — 99213 OFFICE O/P EST LOW 20 MIN: CPT | Mod: PBBFAC | Performed by: STUDENT IN AN ORGANIZED HEALTH CARE EDUCATION/TRAINING PROGRAM

## 2022-02-22 ENCOUNTER — ANESTHESIA (OUTPATIENT)
Dept: SURGERY | Facility: HOSPITAL | Age: 27
End: 2022-02-22
Payer: MEDICAID

## 2022-02-22 ENCOUNTER — ANESTHESIA EVENT (OUTPATIENT)
Dept: SURGERY | Facility: HOSPITAL | Age: 27
End: 2022-02-22
Payer: MEDICAID

## 2022-02-22 ENCOUNTER — HOSPITAL ENCOUNTER (OUTPATIENT)
Facility: HOSPITAL | Age: 27
Discharge: HOME | End: 2022-02-22
Attending: STUDENT IN AN ORGANIZED HEALTH CARE EDUCATION/TRAINING PROGRAM | Admitting: STUDENT IN AN ORGANIZED HEALTH CARE EDUCATION/TRAINING PROGRAM
Payer: MEDICAID

## 2022-02-22 VITALS
TEMPERATURE: 98 F | OXYGEN SATURATION: 99 % | RESPIRATION RATE: 16 BRPM | HEART RATE: 60 BPM | HEIGHT: 64 IN | BODY MASS INDEX: 24.75 KG/M2 | WEIGHT: 145 LBS | SYSTOLIC BLOOD PRESSURE: 103 MMHG | DIASTOLIC BLOOD PRESSURE: 61 MMHG

## 2022-02-22 DIAGNOSIS — R10.2 PELVIC PAIN: ICD-10-CM

## 2022-02-22 DIAGNOSIS — N93.9 ABNORMAL UTERINE BLEEDING: ICD-10-CM

## 2022-02-22 LAB — HCG UR QL IA.RAPID: NEGATIVE

## 2022-02-22 PROCEDURE — 71000016 HC POSTOP RECOV ADDL HR: Performed by: STUDENT IN AN ORGANIZED HEALTH CARE EDUCATION/TRAINING PROGRAM

## 2022-02-22 PROCEDURE — 27201423 OPTIME MED/SURG SUP & DEVICES STERILE SUPPLY: Performed by: STUDENT IN AN ORGANIZED HEALTH CARE EDUCATION/TRAINING PROGRAM

## 2022-02-22 PROCEDURE — 36000707: Performed by: STUDENT IN AN ORGANIZED HEALTH CARE EDUCATION/TRAINING PROGRAM

## 2022-02-22 PROCEDURE — 63600175 PHARM REV CODE 636 W HCPCS

## 2022-02-22 PROCEDURE — 88305 TISSUE EXAM BY PATHOLOGIST: CPT | Mod: SUR | Performed by: STUDENT IN AN ORGANIZED HEALTH CARE EDUCATION/TRAINING PROGRAM

## 2022-02-22 PROCEDURE — 58350 REOPEN FALLOPIAN TUBE: CPT | Mod: 51,,, | Performed by: STUDENT IN AN ORGANIZED HEALTH CARE EDUCATION/TRAINING PROGRAM

## 2022-02-22 PROCEDURE — 25000003 PHARM REV CODE 250: Performed by: STUDENT IN AN ORGANIZED HEALTH CARE EDUCATION/TRAINING PROGRAM

## 2022-02-22 PROCEDURE — 88305 SURGICAL PATHOLOGY: ICD-10-PCS | Mod: 26,,, | Performed by: PATHOLOGY

## 2022-02-22 PROCEDURE — 36000706: Performed by: STUDENT IN AN ORGANIZED HEALTH CARE EDUCATION/TRAINING PROGRAM

## 2022-02-22 PROCEDURE — 00840 ANES IPER PX LOWER ABD NOS: CPT | Performed by: STUDENT IN AN ORGANIZED HEALTH CARE EDUCATION/TRAINING PROGRAM

## 2022-02-22 PROCEDURE — 27000689 HC BLADE LARYNGOSCOPE ANY SIZE: Performed by: ANESTHESIOLOGY

## 2022-02-22 PROCEDURE — 81025 URINE PREGNANCY TEST: CPT | Performed by: STUDENT IN AN ORGANIZED HEALTH CARE EDUCATION/TRAINING PROGRAM

## 2022-02-22 PROCEDURE — 58558 HYSTEROSCOPY BIOPSY: CPT | Mod: 51,,, | Performed by: STUDENT IN AN ORGANIZED HEALTH CARE EDUCATION/TRAINING PROGRAM

## 2022-02-22 PROCEDURE — D9220A PRA ANESTHESIA: Mod: ANES,,, | Performed by: ANESTHESIOLOGY

## 2022-02-22 PROCEDURE — 88305 TISSUE EXAM BY PATHOLOGIST: CPT | Mod: 26,,, | Performed by: PATHOLOGY

## 2022-02-22 PROCEDURE — 58558 PR HYSTEROSCOPY,W/ENDO BX: ICD-10-PCS | Mod: 51,,, | Performed by: STUDENT IN AN ORGANIZED HEALTH CARE EDUCATION/TRAINING PROGRAM

## 2022-02-22 PROCEDURE — 25000003 PHARM REV CODE 250

## 2022-02-22 PROCEDURE — 63600175 PHARM REV CODE 636 W HCPCS: Performed by: ANESTHESIOLOGY

## 2022-02-22 PROCEDURE — 27000260 *HC AIRWAY ORAL: Performed by: ANESTHESIOLOGY

## 2022-02-22 PROCEDURE — 71000033 HC RECOVERY, INTIAL HOUR: Performed by: STUDENT IN AN ORGANIZED HEALTH CARE EDUCATION/TRAINING PROGRAM

## 2022-02-22 PROCEDURE — 71000015 HC POSTOP RECOV 1ST HR: Performed by: STUDENT IN AN ORGANIZED HEALTH CARE EDUCATION/TRAINING PROGRAM

## 2022-02-22 PROCEDURE — 58350 PR REOPEN FALLOPIAN TUBE,CHROMOTUBATION: ICD-10-PCS | Mod: 51,,, | Performed by: STUDENT IN AN ORGANIZED HEALTH CARE EDUCATION/TRAINING PROGRAM

## 2022-02-22 PROCEDURE — 27000165 HC TUBE, ETT CUFFED: Performed by: ANESTHESIOLOGY

## 2022-02-22 PROCEDURE — 49320 DIAG LAPARO SEPARATE PROC: CPT | Mod: ,,, | Performed by: STUDENT IN AN ORGANIZED HEALTH CARE EDUCATION/TRAINING PROGRAM

## 2022-02-22 PROCEDURE — 37000008 HC ANESTHESIA 1ST 15 MINUTES: Performed by: STUDENT IN AN ORGANIZED HEALTH CARE EDUCATION/TRAINING PROGRAM

## 2022-02-22 PROCEDURE — D9220A PRA ANESTHESIA: Mod: CRNA,,,

## 2022-02-22 PROCEDURE — D9220A PRA ANESTHESIA: ICD-10-PCS | Mod: CRNA,,,

## 2022-02-22 PROCEDURE — 27000716 HC OXISENSOR PROBE, ANY SIZE: Performed by: ANESTHESIOLOGY

## 2022-02-22 PROCEDURE — 27000655: Performed by: ANESTHESIOLOGY

## 2022-02-22 PROCEDURE — 49320 PR LAP,DIAGNOSTIC ABDOMEN: ICD-10-PCS | Mod: ,,, | Performed by: STUDENT IN AN ORGANIZED HEALTH CARE EDUCATION/TRAINING PROGRAM

## 2022-02-22 PROCEDURE — 37000009 HC ANESTHESIA EA ADD 15 MINS: Performed by: STUDENT IN AN ORGANIZED HEALTH CARE EDUCATION/TRAINING PROGRAM

## 2022-02-22 PROCEDURE — D9220A PRA ANESTHESIA: ICD-10-PCS | Mod: ANES,,, | Performed by: ANESTHESIOLOGY

## 2022-02-22 RX ORDER — DEXAMETHASONE SODIUM PHOSPHATE 4 MG/ML
INJECTION, SOLUTION INTRA-ARTICULAR; INTRALESIONAL; INTRAMUSCULAR; INTRAVENOUS; SOFT TISSUE
Status: DISCONTINUED | OUTPATIENT
Start: 2022-02-22 | End: 2022-02-22

## 2022-02-22 RX ORDER — PROCHLORPERAZINE EDISYLATE 5 MG/ML
5 INJECTION INTRAMUSCULAR; INTRAVENOUS EVERY 6 HOURS PRN
Status: DISCONTINUED | OUTPATIENT
Start: 2022-02-22 | End: 2022-02-22 | Stop reason: HOSPADM

## 2022-02-22 RX ORDER — SODIUM CHLORIDE, SODIUM LACTATE, POTASSIUM CHLORIDE, CALCIUM CHLORIDE 600; 310; 30; 20 MG/100ML; MG/100ML; MG/100ML; MG/100ML
INJECTION, SOLUTION INTRAVENOUS CONTINUOUS
Status: DISCONTINUED | OUTPATIENT
Start: 2022-02-22 | End: 2022-02-22 | Stop reason: HOSPADM

## 2022-02-22 RX ORDER — SODIUM CHLORIDE, SODIUM LACTATE, POTASSIUM CHLORIDE, CALCIUM CHLORIDE 600; 310; 30; 20 MG/100ML; MG/100ML; MG/100ML; MG/100ML
125 INJECTION, SOLUTION INTRAVENOUS CONTINUOUS
Status: DISCONTINUED | OUTPATIENT
Start: 2022-02-22 | End: 2022-02-22 | Stop reason: HOSPADM

## 2022-02-22 RX ORDER — ONDANSETRON 2 MG/ML
INJECTION INTRAMUSCULAR; INTRAVENOUS
Status: DISCONTINUED | OUTPATIENT
Start: 2022-02-22 | End: 2022-02-22

## 2022-02-22 RX ORDER — FENTANYL CITRATE 50 UG/ML
INJECTION, SOLUTION INTRAMUSCULAR; INTRAVENOUS
Status: DISCONTINUED | OUTPATIENT
Start: 2022-02-22 | End: 2022-02-22

## 2022-02-22 RX ORDER — LIDOCAINE HYDROCHLORIDE 20 MG/ML
INJECTION INTRAVENOUS
Status: DISCONTINUED | OUTPATIENT
Start: 2022-02-22 | End: 2022-02-22

## 2022-02-22 RX ORDER — ONDANSETRON 2 MG/ML
4 INJECTION INTRAMUSCULAR; INTRAVENOUS DAILY PRN
Status: DISCONTINUED | OUTPATIENT
Start: 2022-02-22 | End: 2022-02-22 | Stop reason: HOSPADM

## 2022-02-22 RX ORDER — HYDROCODONE BITARTRATE AND ACETAMINOPHEN 5; 325 MG/1; MG/1
1 TABLET ORAL EVERY 4 HOURS PRN
Status: DISCONTINUED | OUTPATIENT
Start: 2022-02-22 | End: 2022-02-22 | Stop reason: HOSPADM

## 2022-02-22 RX ORDER — DIPHENHYDRAMINE HCL 25 MG
25 CAPSULE ORAL EVERY 4 HOURS PRN
Status: DISCONTINUED | OUTPATIENT
Start: 2022-02-22 | End: 2022-02-22 | Stop reason: HOSPADM

## 2022-02-22 RX ORDER — MEPERIDINE HYDROCHLORIDE 25 MG/ML
25 INJECTION INTRAMUSCULAR; INTRAVENOUS; SUBCUTANEOUS EVERY 10 MIN PRN
Status: DISCONTINUED | OUTPATIENT
Start: 2022-02-22 | End: 2022-02-22 | Stop reason: HOSPADM

## 2022-02-22 RX ORDER — ONDANSETRON 4 MG/1
8 TABLET, ORALLY DISINTEGRATING ORAL EVERY 8 HOURS PRN
Status: DISCONTINUED | OUTPATIENT
Start: 2022-02-22 | End: 2022-02-22 | Stop reason: HOSPADM

## 2022-02-22 RX ORDER — HYDROCODONE BITARTRATE AND ACETAMINOPHEN 5; 325 MG/1; MG/1
1 TABLET ORAL EVERY 6 HOURS PRN
Qty: 5 TABLET | Refills: 0 | Status: SHIPPED | OUTPATIENT
Start: 2022-02-22 | End: 2022-05-10

## 2022-02-22 RX ORDER — MIDAZOLAM HYDROCHLORIDE 1 MG/ML
INJECTION INTRAMUSCULAR; INTRAVENOUS
Status: DISCONTINUED | OUTPATIENT
Start: 2022-02-22 | End: 2022-02-22

## 2022-02-22 RX ORDER — BUPIVACAINE HYDROCHLORIDE 2.5 MG/ML
INJECTION, SOLUTION EPIDURAL; INFILTRATION; INTRACAUDAL
Status: DISCONTINUED | OUTPATIENT
Start: 2022-02-22 | End: 2022-02-22 | Stop reason: HOSPADM

## 2022-02-22 RX ORDER — HYDROMORPHONE HYDROCHLORIDE 2 MG/ML
0.5 INJECTION, SOLUTION INTRAMUSCULAR; INTRAVENOUS; SUBCUTANEOUS EVERY 5 MIN PRN
Status: DISCONTINUED | OUTPATIENT
Start: 2022-02-22 | End: 2022-02-22 | Stop reason: HOSPADM

## 2022-02-22 RX ORDER — PHENYLEPHRINE HYDROCHLORIDE 10 MG/ML
INJECTION INTRAVENOUS
Status: DISCONTINUED | OUTPATIENT
Start: 2022-02-22 | End: 2022-02-22

## 2022-02-22 RX ORDER — DIPHENHYDRAMINE HYDROCHLORIDE 50 MG/ML
25 INJECTION INTRAMUSCULAR; INTRAVENOUS EVERY 6 HOURS PRN
Status: DISCONTINUED | OUTPATIENT
Start: 2022-02-22 | End: 2022-02-22 | Stop reason: HOSPADM

## 2022-02-22 RX ORDER — MORPHINE SULFATE 10 MG/ML
4 INJECTION INTRAMUSCULAR; INTRAVENOUS; SUBCUTANEOUS EVERY 5 MIN PRN
Status: DISCONTINUED | OUTPATIENT
Start: 2022-02-22 | End: 2022-02-22 | Stop reason: HOSPADM

## 2022-02-22 RX ORDER — OXYCODONE HYDROCHLORIDE 5 MG/1
5 TABLET ORAL
Status: DISCONTINUED | OUTPATIENT
Start: 2022-02-22 | End: 2022-02-22 | Stop reason: HOSPADM

## 2022-02-22 RX ORDER — PROPOFOL 10 MG/ML
VIAL (ML) INTRAVENOUS
Status: DISCONTINUED | OUTPATIENT
Start: 2022-02-22 | End: 2022-02-22

## 2022-02-22 RX ORDER — ROCURONIUM BROMIDE 10 MG/ML
INJECTION, SOLUTION INTRAVENOUS
Status: DISCONTINUED | OUTPATIENT
Start: 2022-02-22 | End: 2022-02-22

## 2022-02-22 RX ORDER — DIPHENHYDRAMINE HYDROCHLORIDE 50 MG/ML
25 INJECTION INTRAMUSCULAR; INTRAVENOUS EVERY 4 HOURS PRN
Status: DISCONTINUED | OUTPATIENT
Start: 2022-02-22 | End: 2022-02-22 | Stop reason: HOSPADM

## 2022-02-22 RX ADMIN — ROCURONIUM BROMIDE 30 MG: 10 INJECTION, SOLUTION INTRAVENOUS at 09:02

## 2022-02-22 RX ADMIN — PROPOFOL 150 MG: 10 INJECTION, EMULSION INTRAVENOUS at 09:02

## 2022-02-22 RX ADMIN — MIDAZOLAM 2 MG: 1 INJECTION INTRAMUSCULAR; INTRAVENOUS at 09:02

## 2022-02-22 RX ADMIN — HYDROCODONE BITARTRATE AND ACETAMINOPHEN 1 TABLET: 5; 325 TABLET ORAL at 12:02

## 2022-02-22 RX ADMIN — PROPOFOL 50 MG: 10 INJECTION, EMULSION INTRAVENOUS at 10:02

## 2022-02-22 RX ADMIN — ONDANSETRON 4 MG: 2 INJECTION INTRAMUSCULAR; INTRAVENOUS at 09:02

## 2022-02-22 RX ADMIN — ROPIVACAINE HYDROCHLORIDE 30 ML: 7.5 INJECTION, SOLUTION EPIDURAL; PERINEURAL at 10:02

## 2022-02-22 RX ADMIN — SODIUM CHLORIDE, POTASSIUM CHLORIDE, SODIUM LACTATE AND CALCIUM CHLORIDE: 600; 310; 30; 20 INJECTION, SOLUTION INTRAVENOUS at 09:02

## 2022-02-22 RX ADMIN — SODIUM CHLORIDE, POTASSIUM CHLORIDE, SODIUM LACTATE AND CALCIUM CHLORIDE 125 ML/HR: 600; 310; 30; 20 INJECTION, SOLUTION INTRAVENOUS at 11:02

## 2022-02-22 RX ADMIN — DEXAMETHASONE SODIUM PHOSPHATE 8 MG: 4 INJECTION, SOLUTION INTRA-ARTICULAR; INTRALESIONAL; INTRAMUSCULAR; INTRAVENOUS; SOFT TISSUE at 09:02

## 2022-02-22 RX ADMIN — HYDROMORPHONE HYDROCHLORIDE 0.5 MG: 2 INJECTION INTRAMUSCULAR; INTRAVENOUS; SUBCUTANEOUS at 11:02

## 2022-02-22 RX ADMIN — PHENYLEPHRINE HYDROCHLORIDE 100 MCG: 10 INJECTION INTRAVENOUS at 10:02

## 2022-02-22 RX ADMIN — FENTANYL CITRATE 100 MCG: 50 INJECTION INTRAMUSCULAR; INTRAVENOUS at 09:02

## 2022-02-22 RX ADMIN — SUGAMMADEX 200 MG: 100 INJECTION, SOLUTION INTRAVENOUS at 10:02

## 2022-02-22 RX ADMIN — LIDOCAINE HYDROCHLORIDE 60 MG: 20 INJECTION, SOLUTION INTRAVENOUS at 09:02

## 2022-02-22 RX ADMIN — ROCURONIUM BROMIDE 20 MG: 10 INJECTION, SOLUTION INTRAVENOUS at 10:02

## 2022-02-22 NOTE — ANESTHESIA PREPROCEDURE EVALUATION
02/22/2022  Rebekah Patterson is a 26 y.o., female.      Pre-op Assessment    I have reviewed the Patient Summary Reports.     I have reviewed the Nursing Notes. I have reviewed the NPO Status.   I have reviewed the Medications.     Review of Systems  Anesthesia Hx:  No problems with previous Anesthesia    Social:  No Alcohol Use, Smoker vapes   Hematology/Oncology:  Hematology Normal   Oncology Normal     EENT/Dental:EENT/Dental Normal   Cardiovascular:  Cardiovascular Normal     Pulmonary:  Pulmonary Normal    Renal/:  Renal/ Normal     Hepatic/GI:  Hepatic/GI Normal    Musculoskeletal:  Musculoskeletal Normal    Neurological:  Neurology Normal    Endocrine:  Endocrine Normal    Dermatological:  Skin Normal    Psych:  Psychiatric Normal           Physical Exam    Airway:  Mallampati: II   TM Distance: Normal  Tongue: Normal  Neck ROM: Normal ROM    Chest/Lungs:  Clear to auscultation    Heart:  Rate: Normal  Rhythm: Regular Rhythm        Anesthesia Plan  Type of Anesthesia, risks & benefits discussed:    Anesthesia Type: Gen ETT  Intra-op Monitoring Plan: Standard ASA Monitors  Airway Plan: Direct  Informed Consent: Patient consented to blood products? Yes  ASA Score: 2  Day of Surgery Review of History & Physical: H&P Update referred to the surgeon/provider.I have interviewed and examined the patient. I have reviewed the patient's H&P dated: There are no significant changes. H&P completed by Anesthesiologist.    Ready For Surgery From Anesthesia Perspective.     .

## 2022-02-22 NOTE — TRANSFER OF CARE
"Anesthesia Transfer of Care Note    Patient: Rebekah Patterson    Procedure(s) Performed: Procedure(s) (LRB):  LAPAROSCOPY (N/A)  HYSTEROSCOPY, DIAGNOSTIC LAPAROSCOPY, CHROMOTUBATION (N/A)  DILATION AND CURETTAGE, UTERUS (N/A)    Patient location: PACU    Anesthesia Type: general    Transport from OR: Transported from OR on room air with adequate spontaneous ventilation    Post pain: adequate analgesia    Post assessment: no apparent anesthetic complications    Post vital signs: stable    Level of consciousness: responds to stimulation    Nausea/Vomiting: no nausea/vomiting    Complications: none    Transfer of care protocol was followed      Last vitals:   Visit Vitals  BP (!) 103/57 (BP Location: Right arm, Patient Position: Lying)   Pulse 94   Temp 36.7 °C (98.1 °F) (Axillary)   Resp 14   Ht 5' 4" (1.626 m)   Wt 65.8 kg (145 lb)   LMP  (LMP Unknown)   SpO2 97%   Breastfeeding No   BMI 24.89 kg/m²     "

## 2022-02-22 NOTE — DISCHARGE INSTRUCTIONS
MAY SHOWER TOMORROW. PAT DRY SURGERY SITES  GLUE WILL COME OFF IN 5- 7 DAYS  REPORT ANY S/S INFECTION, REDNESS. FEVER, CHILLS. DRAINAGE, ODOR  REPORT ANY EXCESSIVE BLEEDING OR CLOTTING  USE PERIPADS AND DO NOT USE TAMPONS  TAKE PAIN MEDS AS NEEDED

## 2022-02-22 NOTE — PLAN OF CARE
ASSISTED PATIENT TO BR. VOIDED WELL. MICAH WELL. ASSISTED PATIENT BACK TO BED AND ASSISTED PATIENT TO GET DRESSED. IV CONTINUES. PATIENT DRINKING. PAIN LEVEL 6. PAIN MED REQUESTED

## 2022-02-22 NOTE — ANESTHESIA PROCEDURE NOTES
Intubation    Date/Time: 2/22/2022 9:35 AM  Performed by: Lm Valenzuela CRNA  Authorized by: Maxwell Dee MD     Intubation:     Induction:  Intravenous    Intubated:  Postinduction    Mask Ventilation:  Easy mask    Attempts:  1    Attempted By:  CRNA    Method of Intubation:  Direct    Blade:  Thakur 2    Laryngeal View Grade: Grade I - full view of cords      Difficult Airway Encountered?: No      Airway Device:  Oral endotracheal tube    Airway Device Size:  7.0    Style/Cuff Inflation:  Cuffed    Inflation Amount (mL):  7    Tube secured:  20    Secured at:  The teeth    Placement Verified By:  Capnometry    Complicating Factors:  None    Findings Post-Intubation:  BS equal bilateral and atraumatic/condition of teeth unchanged

## 2022-02-23 LAB
ESTROGEN SERPL-MCNC: NORMAL PG/ML
INSULIN SERPL-ACNC: NORMAL U[IU]/ML
LAB AP GROSS DESCRIPTION: NORMAL
LAB AP LABORATORY NOTES: NORMAL
T3RU NFR SERPL: NORMAL %

## 2022-02-24 ENCOUNTER — HOSPITAL ENCOUNTER (EMERGENCY)
Facility: HOSPITAL | Age: 27
Discharge: HOME | End: 2022-02-24
Payer: MEDICAID

## 2022-02-24 VITALS
HEART RATE: 65 BPM | OXYGEN SATURATION: 100 % | WEIGHT: 146 LBS | BODY MASS INDEX: 24.92 KG/M2 | HEIGHT: 64 IN | SYSTOLIC BLOOD PRESSURE: 114 MMHG | RESPIRATION RATE: 20 BRPM | DIASTOLIC BLOOD PRESSURE: 57 MMHG | TEMPERATURE: 99 F

## 2022-02-24 DIAGNOSIS — R14.1 ABDOMINAL GAS PAIN: Primary | ICD-10-CM

## 2022-02-24 DIAGNOSIS — N93.9 ABNORMAL UTERINE BLEEDING: ICD-10-CM

## 2022-02-24 DIAGNOSIS — Z98.890 HISTORY OF LAPAROSCOPY: ICD-10-CM

## 2022-02-24 DIAGNOSIS — N96 HISTORY OF RECURRENT MISCARRIAGES: ICD-10-CM

## 2022-02-24 DIAGNOSIS — R10.2 PELVIC PAIN: ICD-10-CM

## 2022-02-24 PROCEDURE — 99283 PR EMERGENCY DEPT VISIT,LEVEL III: ICD-10-PCS | Mod: ,,, | Performed by: NURSE PRACTITIONER

## 2022-02-24 PROCEDURE — 63600175 PHARM REV CODE 636 W HCPCS: Performed by: NURSE PRACTITIONER

## 2022-02-24 PROCEDURE — 96372 THER/PROPH/DIAG INJ SC/IM: CPT

## 2022-02-24 PROCEDURE — 99283 EMERGENCY DEPT VISIT LOW MDM: CPT | Mod: ,,, | Performed by: NURSE PRACTITIONER

## 2022-02-24 PROCEDURE — 99284 EMERGENCY DEPT VISIT MOD MDM: CPT

## 2022-02-24 RX ORDER — KETOROLAC TROMETHAMINE 30 MG/ML
60 INJECTION, SOLUTION INTRAMUSCULAR; INTRAVENOUS
Status: COMPLETED | OUTPATIENT
Start: 2022-02-24 | End: 2022-02-24

## 2022-02-24 RX ORDER — PROMETHAZINE HYDROCHLORIDE 25 MG/1
25 TABLET ORAL EVERY 6 HOURS PRN
Qty: 10 TABLET | Refills: 0 | Status: ON HOLD | OUTPATIENT
Start: 2022-02-24 | End: 2022-09-13 | Stop reason: HOSPADM

## 2022-02-24 RX ORDER — ONDANSETRON 4 MG/1
4 TABLET, ORALLY DISINTEGRATING ORAL EVERY 6 HOURS PRN
Qty: 12 TABLET | Refills: 0 | Status: SHIPPED | OUTPATIENT
Start: 2022-02-24 | End: 2022-05-10

## 2022-02-24 RX ADMIN — KETOROLAC TROMETHAMINE 60 MG: 30 INJECTION, SOLUTION INTRAMUSCULAR at 12:02

## 2022-02-24 NOTE — DISCHARGE INSTRUCTIONS
Keep scheduled appointment with Dr Ding.  Ambulate to promote absorption of post op abdominal gas.   Continue current medications previously prescribed.   Return to ER with new or worsening symptoms.

## 2022-02-24 NOTE — Clinical Note
"Rebekah"Rhonda Patterson was seen and treated in our emergency department on 2/24/2022.  She may return to work on 02/26/2022.       If you have any questions or concerns, please don't hesitate to call.      TAZ Ryder"

## 2022-02-24 NOTE — ED PROVIDER NOTES
Encounter Date: 2/24/2022       History     Chief Complaint   Patient presents with    Post-op Problem     Patient presents to ER with complaint of abdominal pain.  Patient is 2 days post op laparoscopy and D&C with Dr Ding.  She describes pain as sharp pain in her abdomen that is also under her right ribs.  She also describes shoulder and back pain.  She has been nauseated.  She has not had appetite.  Denies vomiting.  She admits to normal bowel movement since surgery but only x 1.  She denies fever.      The history is provided by the patient and a relative. No  was used.     Review of patient's allergies indicates:   Allergen Reactions    Macrobid [nitrofurantoin monohyd/m-cryst] Hives    Penicillins Hives, Itching, Rash and Swelling     Past Medical History:   Diagnosis Date    Mental disorder      Past Surgical History:   Procedure Laterality Date    DILATION AND CURETTAGE OF UTERUS N/A 2/22/2022    Procedure: DILATION AND CURETTAGE, UTERUS;  Surgeon: Abdiel Ding DO;  [facility];  Service: OB/GYN;  Laterality: N/A;    HYSTEROSCOPY N/A 2/22/2022    Procedure: HYSTEROSCOPY, DIAGNOSTIC LAPAROSCOPY, CHROMOTUBATION;  Surgeon: Abdiel Ding DO;  Location: [facility] OR;  Service: OB/GYN;  Laterality: N/A;    LAPAROSCOPY N/A 2/22/2022    Procedure: LAPAROSCOPY;  Surgeon: Abdiel Ding DO;  Location: [facility] OR;  Service: OB/GYN;  Laterality: N/A;    miscarriage      x 4     TONSILLECTOMY      TYMPANOPLASTY      x4      Family History   Problem Relation Age of Onset    Stroke Mother      Social History     Tobacco Use    Smoking status: Current Every Day Smoker     Types: Vaping with nicotine    Smokeless tobacco: Never Used   Substance Use Topics    Alcohol use: Never    Drug use: Never     Review of Systems   Constitutional: Positive for appetite change and fatigue.   Gastrointestinal: Positive for abdominal distention, abdominal pain  and nausea.   Musculoskeletal: Positive for back pain.   All other systems reviewed and are negative.      Physical Exam     Initial Vitals [02/24/22 1135]   BP Pulse Resp Temp SpO2   (!) 114/57 65 20 98.6 °F (37 °C) 100 %      MAP       --         Physical Exam    Nursing note and vitals reviewed.  Constitutional: She appears well-developed and well-nourished.   HENT:   Head: Normocephalic.   Right Ear: External ear normal.   Left Ear: External ear normal.   Nose: Nose normal.   Mouth/Throat: Oropharynx is clear and moist.   Eyes: Conjunctivae and EOM are normal. Pupils are equal, round, and reactive to light.   Neck: Neck supple.   Normal range of motion.  Cardiovascular: Normal rate, regular rhythm, normal heart sounds and intact distal pulses.   Pulmonary/Chest: Breath sounds normal.   Abdominal: Abdomen is soft and protuberant. Bowel sounds are normal. She exhibits distension. There is abdominal tenderness (incision site at umbilicus and suprapubic area) in the suprapubic area.       Musculoskeletal:         General: Normal range of motion.      Cervical back: Normal range of motion and neck supple.     Neurological: She is alert and oriented to person, place, and time. She has normal strength. GCS score is 15. GCS eye subscore is 4. GCS verbal subscore is 5. GCS motor subscore is 6.   Skin: Skin is warm and dry. Capillary refill takes less than 2 seconds.   Psychiatric: She has a normal mood and affect. Her behavior is normal. Judgment and thought content normal.         Medical Screening Exam   See Full Note    ED Course   Procedures  Labs Reviewed - No data to display       Imaging Results    None          Medications   ketorolac injection 60 mg (has no administration in time range)                       Clinical Impression:   Final diagnoses:  [Z98.890] History of laparoscopy  [R14.1] Abdominal gas pain (Primary)          ED Disposition Condition    Discharge Stable        ED Prescriptions     Medication  Sig Dispense Start Date End Date Auth. Provider    ondansetron (ZOFRAN-ODT) 4 MG TbDL Take 1 tablet (4 mg total) by mouth every 6 (six) hours as needed (nausea). 12 tablet 2/24/2022  TAZ Ryder    promethazine (PHENERGAN) 25 MG tablet Take 1 tablet (25 mg total) by mouth every 6 (six) hours as needed for Nausea. 10 tablet 2/24/2022  TAZ Ryder        Follow-up Information     Follow up With Specialties Details Why Contact Info    Abdiel Ding DO Obstetrics and Gynecology Schedule an appointment as soon as possible for a visit  If symptoms worsen [address] [place] [id number]  [phone]             TAZ Ryder  02/24/22 7015

## 2022-03-01 ENCOUNTER — OFFICE VISIT (OUTPATIENT)
Dept: OBSTETRICS AND GYNECOLOGY | Facility: CLINIC | Age: 27
End: 2022-03-01
Payer: MEDICAID

## 2022-03-01 VITALS
WEIGHT: 145 LBS | DIASTOLIC BLOOD PRESSURE: 70 MMHG | BODY MASS INDEX: 24.75 KG/M2 | HEIGHT: 64 IN | RESPIRATION RATE: 15 BRPM | SYSTOLIC BLOOD PRESSURE: 120 MMHG

## 2022-03-01 DIAGNOSIS — Z48.89 ENCOUNTER FOR POST SURGICAL WOUND CHECK: ICD-10-CM

## 2022-03-01 PROCEDURE — 3074F PR MOST RECENT SYSTOLIC BLOOD PRESSURE < 130 MM HG: ICD-10-PCS | Mod: CPTII,,, | Performed by: STUDENT IN AN ORGANIZED HEALTH CARE EDUCATION/TRAINING PROGRAM

## 2022-03-01 PROCEDURE — 99213 OFFICE O/P EST LOW 20 MIN: CPT | Mod: PBBFAC | Performed by: STUDENT IN AN ORGANIZED HEALTH CARE EDUCATION/TRAINING PROGRAM

## 2022-03-01 PROCEDURE — 3078F DIAST BP <80 MM HG: CPT | Mod: CPTII,,, | Performed by: STUDENT IN AN ORGANIZED HEALTH CARE EDUCATION/TRAINING PROGRAM

## 2022-03-01 PROCEDURE — 1159F PR MEDICATION LIST DOCUMENTED IN MEDICAL RECORD: ICD-10-PCS | Mod: CPTII,,, | Performed by: STUDENT IN AN ORGANIZED HEALTH CARE EDUCATION/TRAINING PROGRAM

## 2022-03-01 PROCEDURE — 99024 PR POST-OP FOLLOW-UP VISIT: ICD-10-PCS | Mod: ,,, | Performed by: STUDENT IN AN ORGANIZED HEALTH CARE EDUCATION/TRAINING PROGRAM

## 2022-03-01 PROCEDURE — 99024 POSTOP FOLLOW-UP VISIT: CPT | Mod: ,,, | Performed by: STUDENT IN AN ORGANIZED HEALTH CARE EDUCATION/TRAINING PROGRAM

## 2022-03-01 PROCEDURE — 3078F PR MOST RECENT DIASTOLIC BLOOD PRESSURE < 80 MM HG: ICD-10-PCS | Mod: CPTII,,, | Performed by: STUDENT IN AN ORGANIZED HEALTH CARE EDUCATION/TRAINING PROGRAM

## 2022-03-01 PROCEDURE — 3074F SYST BP LT 130 MM HG: CPT | Mod: CPTII,,, | Performed by: STUDENT IN AN ORGANIZED HEALTH CARE EDUCATION/TRAINING PROGRAM

## 2022-03-01 PROCEDURE — 3008F PR BODY MASS INDEX (BMI) DOCUMENTED: ICD-10-PCS | Mod: CPTII,,, | Performed by: STUDENT IN AN ORGANIZED HEALTH CARE EDUCATION/TRAINING PROGRAM

## 2022-03-01 PROCEDURE — 1159F MED LIST DOCD IN RCRD: CPT | Mod: CPTII,,, | Performed by: STUDENT IN AN ORGANIZED HEALTH CARE EDUCATION/TRAINING PROGRAM

## 2022-03-01 PROCEDURE — 3008F BODY MASS INDEX DOCD: CPT | Mod: CPTII,,, | Performed by: STUDENT IN AN ORGANIZED HEALTH CARE EDUCATION/TRAINING PROGRAM

## 2022-03-01 NOTE — PROGRESS NOTES
"Postoperative Note    Assessment/Plan:    Problem List Items Addressed This Visit        Other    Encounter for post surgical wound check    Overview     S/p dx laparoscopy, chromotubation and HDC on 2/22  Incisions healing well   Reviewed surgical findings, pictures and pathology  All questions answered                   CC:   Chief Complaint   Patient presents with    Post-op Evaluation       Subjective:    Pt seen and examined.  Doing well. No concerns complaints. Pain well controlled. Bowel and bladder functioning well.    Review of Systems - The following ROS was otherwise negative, except as noted in the HPI:  constitutional, respiratory, cardiovascular, gastrointestinal, genitourinary    Objective:  /70 (BP Location: Left arm, Patient Position: Sitting)   Resp 15   Ht 5' 4" (1.626 m)   Wt 65.8 kg (145 lb)   LMP  (LMP Unknown)   BMI 24.89 kg/m²   General:  Alert, well appearing, no acute distress  Abdomen:  Soft, appropriately tender to palpation, non-distended  Incisions:  Well healed, appear c/d/i, no significant drainage or erythema  Extremities:  No redness or tenderness in LE, neg Itm's sign    Path:   Specimens (From admission, onward)    None          "

## 2022-03-02 PROBLEM — Z48.89 ENCOUNTER FOR POST SURGICAL WOUND CHECK: Status: ACTIVE | Noted: 2022-03-02

## 2022-03-02 PROCEDURE — 64488 PERIPHERAL BLOCK: ICD-10-PCS | Mod: XU,,, | Performed by: ANESTHESIOLOGY

## 2022-03-02 PROCEDURE — 64488 TAP BLOCK BI INJECTION: CPT | Mod: XU,,, | Performed by: ANESTHESIOLOGY

## 2022-03-02 RX ORDER — ROPIVACAINE HYDROCHLORIDE 7.5 MG/ML
INJECTION, SOLUTION EPIDURAL; PERINEURAL
Status: DISCONTINUED | OUTPATIENT
Start: 2022-02-22 | End: 2022-03-02

## 2022-03-02 NOTE — ANESTHESIA PROCEDURE NOTES
Patient called back in response to earlier phone call.    Given MD message below and patient verbalized understanding to all information given. He had no further questions/concerns.     Peripheral Block    Patient location during procedure: OR   Block not for primary anesthetic.  Reason for block: at surgeon's request and post-op pain management   Post-op Pain Location: abdominal pain   Start time: 2/22/2022 10:51 AM  Timeout: 2/22/2022 10:50 AM   End time: 2/22/2022 10:54 AM    Staffing  Authorizing Provider: Del Weber MD  Performing Provider: Del Weber MD    Preanesthetic Checklist  Completed: patient identified, IV checked, site marked, risks and benefits discussed, surgical consent, monitors and equipment checked, pre-op evaluation and timeout performed  Peripheral Block  Patient position: supine  Prep: ChloraPrep  Patient monitoring: heart rate, continuous pulse ox, continuous capnometry, frequent blood pressure checks and cardiac monitor  Block type: TAP  Laterality: bilateral  Injection technique: single shot  Needle  Needle type: Echogenic   Needle gauge: 22 G  Needle length: 3.5 in  Needle localization: anatomical landmarks and ultrasound guidance   -ultrasound image captured on disc.  Assessment  Injection assessment: negative aspiration and negative parasthesia  Paresthesia pain: none  Heart rate change: no  Slow fractionated injection: yes  Pain Tolerance: comfortable throughout block  Medications:    Medications: ROPIvacaine (NAROPIN) injection 0.75% - Perineural   30 mL - 2/22/2022 10:55:00 AM

## 2022-03-02 NOTE — ANESTHESIA POSTPROCEDURE EVALUATION
Anesthesia Post Evaluation    Patient: Rebekah Patterson    Procedure(s) Performed: Procedure(s) (LRB):  LAPAROSCOPY (N/A)  HYSTEROSCOPY, DIAGNOSTIC LAPAROSCOPY, CHROMOTUBATION (N/A)  DILATION AND CURETTAGE, UTERUS (N/A)    Final Anesthesia Type: general      Patient location during evaluation: PACU  Patient participation: Yes- Able to Participate  Level of consciousness: awake and alert  Post-procedure vital signs: reviewed and stable  Pain management: adequate  Airway patency: patent  MICHAEL mitigation strategies: Multimodal analgesia and Use of major conduction anesthesia (spinal/epidural) or peripheral nerve block  PONV status at discharge: No PONV  Anesthetic complications: no      Cardiovascular status: blood pressure returned to baseline  Respiratory status: unassisted  Hydration status: euvolemic  Follow-up not needed.          Vitals Value Taken Time   /61 02/22/22 1245   Temp 36.4 °C (97.6 °F) 02/22/22 1245   Pulse 60 02/22/22 1300   Resp 16 02/22/22 1215   SpO2 99 % 02/22/22 1300         Event Time   Out of Recovery 11:27:06         Pain/Gaby Score: No data recorded

## 2022-04-21 ENCOUNTER — HOSPITAL ENCOUNTER (OUTPATIENT)
Dept: RADIOLOGY | Facility: HOSPITAL | Age: 27
Discharge: HOME | End: 2022-04-21
Attending: STUDENT IN AN ORGANIZED HEALTH CARE EDUCATION/TRAINING PROGRAM
Payer: MEDICAID

## 2022-04-21 ENCOUNTER — PATIENT MESSAGE (OUTPATIENT)
Dept: OBSTETRICS AND GYNECOLOGY | Facility: CLINIC | Age: 27
End: 2022-04-21
Payer: MEDICAID

## 2022-04-21 ENCOUNTER — INITIAL PRENATAL (OUTPATIENT)
Dept: OBSTETRICS AND GYNECOLOGY | Facility: CLINIC | Age: 27
End: 2022-04-21
Payer: MEDICAID

## 2022-04-21 DIAGNOSIS — Z34.90 PREGNANCY WITH FETUS OF UNKNOWN GESTATIONAL AGE: Primary | ICD-10-CM

## 2022-04-21 DIAGNOSIS — Z34.90 PREGNANCY WITH FETUS OF UNKNOWN GESTATIONAL AGE: ICD-10-CM

## 2022-04-21 PROCEDURE — 99213 PR OFFICE/OUTPT VISIT, EST, LEVL III, 20-29 MIN: ICD-10-PCS | Mod: S$PBB,TH,, | Performed by: STUDENT IN AN ORGANIZED HEALTH CARE EDUCATION/TRAINING PROGRAM

## 2022-04-21 PROCEDURE — 76817 US OB <14 WEEKS, TRANSABDOM & TRANSVAG, SINGLE GESTATION (XPD): ICD-10-PCS | Mod: 26,,, | Performed by: RADIOLOGY

## 2022-04-21 PROCEDURE — 76801 OB US < 14 WKS SINGLE FETUS: CPT | Mod: TC

## 2022-04-21 PROCEDURE — 99213 OFFICE O/P EST LOW 20 MIN: CPT | Mod: S$PBB,TH,, | Performed by: STUDENT IN AN ORGANIZED HEALTH CARE EDUCATION/TRAINING PROGRAM

## 2022-04-21 PROCEDURE — 76801 OB US < 14 WKS SINGLE FETUS: CPT | Mod: 26,,, | Performed by: RADIOLOGY

## 2022-04-21 PROCEDURE — 99212 OFFICE O/P EST SF 10 MIN: CPT | Mod: PBBFAC,25 | Performed by: STUDENT IN AN ORGANIZED HEALTH CARE EDUCATION/TRAINING PROGRAM

## 2022-04-21 PROCEDURE — 76817 TRANSVAGINAL US OBSTETRIC: CPT | Mod: 26,,, | Performed by: RADIOLOGY

## 2022-04-21 PROCEDURE — 76801 US OB <14 WEEKS, TRANSABDOM & TRANSVAG, SINGLE GESTATION (XPD): ICD-10-PCS | Mod: 26,,, | Performed by: RADIOLOGY

## 2022-04-21 NOTE — PROGRESS NOTES
Presents for dating US after positive home UPT.  US with thickened endometrium, no evidence of pregnancy  Quant hCG ordered  Will trend as appropriate  Return in 3 weeks for repeat US, PAPO.    RICK LIVINGSTON

## 2022-05-04 ENCOUNTER — ROUTINE PRENATAL (OUTPATIENT)
Dept: OBSTETRICS AND GYNECOLOGY | Facility: CLINIC | Age: 27
End: 2022-05-04
Payer: MEDICAID

## 2022-05-04 VITALS
SYSTOLIC BLOOD PRESSURE: 120 MMHG | WEIGHT: 155.19 LBS | BODY MASS INDEX: 26.64 KG/M2 | DIASTOLIC BLOOD PRESSURE: 78 MMHG

## 2022-05-04 DIAGNOSIS — O09.291 HISTORY OF MISCARRIAGE, CURRENTLY PREGNANT, FIRST TRIMESTER: ICD-10-CM

## 2022-05-04 DIAGNOSIS — F41.9 ANXIETY DURING PREGNANCY IN FIRST TRIMESTER, ANTEPARTUM: ICD-10-CM

## 2022-05-04 DIAGNOSIS — O26.891 RH NEGATIVE STATUS DURING PREGNANCY IN FIRST TRIMESTER: ICD-10-CM

## 2022-05-04 DIAGNOSIS — O99.341 ANXIETY DURING PREGNANCY IN FIRST TRIMESTER, ANTEPARTUM: ICD-10-CM

## 2022-05-04 DIAGNOSIS — Z3A.01 LESS THAN 8 WEEKS GESTATION OF PREGNANCY: Primary | ICD-10-CM

## 2022-05-04 DIAGNOSIS — Z67.91 RH NEGATIVE STATUS DURING PREGNANCY IN FIRST TRIMESTER: ICD-10-CM

## 2022-05-04 DIAGNOSIS — Z3A.01 6 WEEKS GESTATION OF PREGNANCY: ICD-10-CM

## 2022-05-04 LAB
BILIRUB SERPL-MCNC: NEGATIVE MG/DL
BLOOD URINE, POC: NORMAL
COLOR, POC UA: YELLOW
GLUCOSE UR QL STRIP: NEGATIVE
KETONES UR QL STRIP: NORMAL
LEUKOCYTE ESTERASE URINE, POC: NORMAL
NITRITE, POC UA: NEGATIVE
PH, POC UA: 5
PROTEIN, POC: NEGATIVE
SPECIFIC GRAVITY, POC UA: 1.02
UROBILINOGEN, POC UA: 0.2

## 2022-05-04 PROCEDURE — G0481 PR DRUG TEST DEF 8-14 CLASSES: ICD-10-PCS | Mod: ,,, | Performed by: CLINICAL MEDICAL LABORATORY

## 2022-05-04 PROCEDURE — 99213 OFFICE O/P EST LOW 20 MIN: CPT | Mod: TH,S$PBB,, | Performed by: STUDENT IN AN ORGANIZED HEALTH CARE EDUCATION/TRAINING PROGRAM

## 2022-05-04 PROCEDURE — 88142 CYTOPATH C/V THIN LAYER: CPT | Mod: GCY | Performed by: STUDENT IN AN ORGANIZED HEALTH CARE EDUCATION/TRAINING PROGRAM

## 2022-05-04 PROCEDURE — 87491 CHLAMYDIA/GONORRHOEAE(GC), PCR: ICD-10-PCS | Mod: ,,, | Performed by: CLINICAL MEDICAL LABORATORY

## 2022-05-04 PROCEDURE — 87591 N.GONORRHOEAE DNA AMP PROB: CPT | Mod: ,,, | Performed by: CLINICAL MEDICAL LABORATORY

## 2022-05-04 PROCEDURE — 87086 URINE CULTURE/COLONY COUNT: CPT | Mod: ,,, | Performed by: CLINICAL MEDICAL LABORATORY

## 2022-05-04 PROCEDURE — 88141 CYTOPATH C/V INTERPRET: CPT | Mod: ,,, | Performed by: PATHOLOGY

## 2022-05-04 PROCEDURE — 81003 URINALYSIS AUTO W/O SCOPE: CPT | Mod: PBBFAC | Performed by: STUDENT IN AN ORGANIZED HEALTH CARE EDUCATION/TRAINING PROGRAM

## 2022-05-04 PROCEDURE — 87086 CULTURE, URINE: ICD-10-PCS | Mod: ,,, | Performed by: CLINICAL MEDICAL LABORATORY

## 2022-05-04 PROCEDURE — G0481 DRUG TEST DEF 8-14 CLASSES: HCPCS | Mod: ,,, | Performed by: CLINICAL MEDICAL LABORATORY

## 2022-05-04 PROCEDURE — 99213 OFFICE O/P EST LOW 20 MIN: CPT | Mod: PBBFAC | Performed by: STUDENT IN AN ORGANIZED HEALTH CARE EDUCATION/TRAINING PROGRAM

## 2022-05-04 PROCEDURE — 87491 CHLMYD TRACH DNA AMP PROBE: CPT | Mod: ,,, | Performed by: CLINICAL MEDICAL LABORATORY

## 2022-05-04 PROCEDURE — 99213 PR OFFICE/OUTPT VISIT, EST, LEVL III, 20-29 MIN: ICD-10-PCS | Mod: TH,S$PBB,, | Performed by: STUDENT IN AN ORGANIZED HEALTH CARE EDUCATION/TRAINING PROGRAM

## 2022-05-04 PROCEDURE — 88141 THINPREP PAP TEST: ICD-10-PCS | Mod: ,,, | Performed by: PATHOLOGY

## 2022-05-04 PROCEDURE — 87591 CHLAMYDIA/GONORRHOEAE(GC), PCR: ICD-10-PCS | Mod: ,,, | Performed by: CLINICAL MEDICAL LABORATORY

## 2022-05-04 RX ORDER — PROGESTERONE 200 MG/1
200 CAPSULE ORAL NIGHTLY
COMMUNITY
Start: 2022-04-27 | End: 2022-05-17 | Stop reason: SDUPTHER

## 2022-05-05 ENCOUNTER — PATIENT MESSAGE (OUTPATIENT)
Dept: OBSTETRICS AND GYNECOLOGY | Facility: CLINIC | Age: 27
End: 2022-05-05
Payer: MEDICAID

## 2022-05-05 LAB
CHLAMYDIA BY PCR: NEGATIVE
N. GONORRHOEAE (GC) BY PCR: NEGATIVE

## 2022-05-06 PROBLEM — R10.2 PELVIC PAIN: Status: RESOLVED | Noted: 2022-02-02 | Resolved: 2022-05-06

## 2022-05-06 PROBLEM — N93.9 ABNORMAL UTERINE BLEEDING: Status: RESOLVED | Noted: 2022-02-02 | Resolved: 2022-05-06

## 2022-05-06 PROBLEM — Z48.89 ENCOUNTER FOR POST SURGICAL WOUND CHECK: Status: RESOLVED | Noted: 2022-03-02 | Resolved: 2022-05-06

## 2022-05-06 PROBLEM — F41.9 ANXIETY DURING PREGNANCY IN FIRST TRIMESTER, ANTEPARTUM: Status: ACTIVE | Noted: 2022-05-06

## 2022-05-06 PROBLEM — O99.341 ANXIETY DURING PREGNANCY IN FIRST TRIMESTER, ANTEPARTUM: Status: ACTIVE | Noted: 2022-05-06

## 2022-05-06 PROBLEM — O26.891 RH NEGATIVE STATUS DURING PREGNANCY IN FIRST TRIMESTER: Status: ACTIVE | Noted: 2022-05-06

## 2022-05-06 PROBLEM — O09.291 HISTORY OF MISCARRIAGE, CURRENTLY PREGNANT, FIRST TRIMESTER: Status: ACTIVE | Noted: 2022-02-02

## 2022-05-06 PROBLEM — Z3A.01 6 WEEKS GESTATION OF PREGNANCY: Status: ACTIVE | Noted: 2022-05-06

## 2022-05-06 PROBLEM — Z67.91 RH NEGATIVE STATUS DURING PREGNANCY IN FIRST TRIMESTER: Status: ACTIVE | Noted: 2022-05-06

## 2022-05-06 LAB
7-AMINOCLONAZEPAM, URINE (RUSH): NEGATIVE 25 NG/ML
A-HYDROXYALPRAZOLAM, URINE (RUSH): NEGATIVE 25 NG/ML
AMITRIPTYLINE, URINE (RUSH): NEGATIVE 25 NG/ML
BENZOYLECGONINE, URINE (RUSH): NEGATIVE 100 NG/ML
BUTALBITAL, URINE (RUSH): NEGATIVE 50 NG/ML
CARISOPRODOL, URINE (RUSH): NEGATIVE 100 NG/ML
CODEINE, URINE (RUSH): NEGATIVE 25 NG/ML
CREAT UR-MCNC: 33 MG/DL (ref 28–219)
EDDP, URINE (RUSH): NEGATIVE 25 NG/ML
HYDROCODONE, URINE (RUSH): NEGATIVE 25 NG/ML
HYDROMORPHONE, URINE (RUSH): NEGATIVE 25 NG/ML
LORAZEPAM, URINE (RUSH): NEGATIVE 25 NG/ML
MEPROBAMATE, URINE (RUSH): NEGATIVE 100 NG/ML
METHADONE UR QL SCN: NEGATIVE 25 NG/ML
METHAMPHET UR QL SCN: NEGATIVE 100 NG/ML
MORPHINE, URINE (RUSH): NEGATIVE 25 NG/ML
NORDIAZEPAM, URINE (RUSH): NEGATIVE 25 NG/ML
NORHYDROCODONE, URINE (RUSH): NEGATIVE 50 NG/ML
NOROXYCODONE HCL, URINE (RUSH): NEGATIVE 50 NG/ML
OXAZEPAM, URINE (RUSH): NEGATIVE 25 NG/ML
OXYCODONE UR QL SCN: NEGATIVE 25 NG/ML
OXYMORPHONE, URINE (RUSH): NEGATIVE 25 NG/ML
PH UR STRIP: 5.5 PH UNITS
PHENOBARBITAL, URINE (RUSH): NEGATIVE 50 NG/ML
SECOBARBITAL, URINE (RUSH): NEGATIVE 50 NG/ML
SP GR UR STRIP: 1.01
TEMAZEPAM, URINE (RUSH): NEGATIVE 25 NG/ML
THC-COOH, URINE (RUSH): NEGATIVE 25 NG/ML
UA COMPLETE W REFLEX CULTURE PNL UR: NORMAL

## 2022-05-07 NOTE — PROGRESS NOTES
New OB History and Physical    CC:   Chief Complaint   Patient presents with    Initial Prenatal Visit        Assessment/Plan:   Rebekah Patterson is a 26 y.o. at 6w6d who presents for new OB visit    Problem List Items Addressed This Visit        Psychiatric    Anxiety during pregnancy in first trimester, antepartum    Overview     Currently on zoloft 100mg              Obstetric    History of miscarriage, currently pregnant, first trimester    Overview     APS labs negative  ASA 162mg qhs  Taking progesterone            6 weeks gestation of pregnancy    Overview     Estimated Date of Delivery: 12/24/22 established by LMP, 6w US consistent  First Trimester  - Prenatal Battery: ordered  - UDS: ordered  - Pap Smear:  pending   - GC/C: ordered      Second Trimester  - Quad: Plan to offer at 15-19wks.  - Anatomy Ultrasound: Will schedule for 20wks    Third Trimester  - 28 wk labs:   - Rh Status: A neg  - 1Hr GCT:   - GC/C:   - GBS:   - EFW/ASTRID:    Vaccines  - Influenza:  - TDAP: Plan to give around 32wks  - COVID:     Contraception:           Rh negative status during pregnancy in first trimester    Overview     A neg  Rhogam at 28 weeks             Other Visit Diagnoses     Less than 8 weeks gestation of pregnancy    -  Primary    Relevant Orders    Chlamydia/GC, PCR (Completed)    OB Drug Screen Definitive, Urine (Completed)    Hepatitis C Antibody (Completed)    CBC Auto Differential (Completed)    Basic Metabolic Panel (Completed)    Hepatitis B Surface Antigen (Completed)    Rubella Antibody, IgG (Completed)    Urine culture (Completed)    POCT URINALYSIS W/O SCOPE (Completed)    Type & Screen (Completed)    HIV 1/2 Ag/Ab (4th Gen) (Completed)    Treponema Pallidum (Syphillis) Antibody (Completed)    ThinPrep Pap Test          HPI: Rebekah Patterson is a 26 y.o. at 6w6d who presents for new OB visit.       Review of Systems: The following ROS was otherwise negative, except as noted in the HPI:   constitutional, HEENT, respiratory, cardiovascular, gastrointestinal, genitourinary, skin, musculoskeletal, neurological, psych    Gynecologic History: Denies hx of abnl pap smears.  No hx of STIs.    Obstetrical History:  OB History        7    Para   2    Term   2            AB   4    Living   2       SAB   4    IAB        Ectopic        Multiple        Live Births   2                 Past Medical History:   Past Medical History:   Diagnosis Date    Mental disorder        Medications:  Medication List with Changes/Refills   Current Medications    HYDROCODONE-ACETAMINOPHEN (NORCO) 5-325 MG PER TABLET    Take 1 tablet by mouth every 6 (six) hours as needed for Pain.    ONDANSETRON (ZOFRAN-ODT) 4 MG TBDL    Take 1 tablet (4 mg total) by mouth every 6 (six) hours as needed (nausea).    PROGESTERONE (PROMETRIUM) 200 MG CAPSULE        PROMETHAZINE (PHENERGAN) 25 MG TABLET    Take 1 tablet (25 mg total) by mouth every 6 (six) hours as needed for Nausea.    SERTRALINE (ZOLOFT) 100 MG TABLET    Take 1 tablet (100 mg total) by mouth once daily.         Allergies:  Macrobid [nitrofurantoin monohyd/m-cryst] and Penicillins    Surgical History:  Past Surgical History:   Procedure Laterality Date    DILATION AND CURETTAGE OF UTERUS N/A 2022    Procedure: DILATION AND CURETTAGE, UTERUS;  Surgeon: Abdiel Ding DO;  Location: [facility];  Service: OB/GYN;  Laterality: N/A;    HYSTEROSCOPY N/A 2022    Procedure: HYSTEROSCOPY, DIAGNOSTIC LAPAROSCOPY, CHROMOTUBATION;  Surgeon: Abdiel Ding DO;  Location: [facility] OR;  Service: OB/GYN;  Laterality: N/A;    LAPAROSCOPY N/A 2022    Procedure: LAPAROSCOPY;  Surgeon: Abdiel Ding DO;  Location: [facility] OR;  Service: OB/GYN;  Laterality: N/A;    miscarriage      x 4     TONSILLECTOMY      TYMPANOPLASTY      x4        Family History:  Family History   Problem Relation Age of Onset    Stroke Mother        Social  History:  Social History     Substance and Sexual Activity   Alcohol Use Never     Social History     Substance and Sexual Activity   Drug Use Never     Social History     Tobacco Use   Smoking Status Current Every Day Smoker    Types: Vaping with nicotine   Smokeless Tobacco Never Used       Physical Exam:  /78   Wt 70.4 kg (155 lb 3.2 oz)   LMP 03/19/2022   BMI 26.64 kg/m²     General: Alert, well appearing, no acute distress  Head: Normocephalic, atraumatic  Lungs: unlabored respirations  Abdomen: Gravid, soft, nontender   Pelvic:   External: normal female genitalia, no masses or lesions   Vagina: moist, pink mucosa with rugae, physiologic discharge  Cervix: no masses or lesions, nontender  Extremities: No redness or tenderness  Skin: Well perfused, normal coloration and turgor, no lesions or rashes visualized  Neuro: Alert, oriented, normal speech, no focal deficits, moves extremities appropriately  Psych: Appropriate, normal affect, appears stated age  Osteopathic: No TART changes      Reviewed frequency of appointments for routine prenatal care, call group partners.  Pregnancy book given, encouraged to read through for questions regarding food/drink and medication safety in pregnancy.  Encouraged Mychart access.  Instructed to stop by the lab after visit for NOB labwork.

## 2022-05-09 LAB
GH SERPL-MCNC: ABNORMAL NG/ML
HPV 16: NEGATIVE
HPV 18: NEGATIVE
HPV OTHER: POSITIVE
INSULIN SERPL-ACNC: ABNORMAL U[IU]/ML
LAB AP CLINICAL INFORMATION: ABNORMAL
LAB AP GYN INTERPRETATION: ABNORMAL
LAB AP PAP DISCLAIMER COMMENTS: ABNORMAL
RENIN PLAS-CCNC: ABNORMAL NG/ML/H

## 2022-05-10 ENCOUNTER — HOSPITAL ENCOUNTER (EMERGENCY)
Facility: HOSPITAL | Age: 27
Discharge: HOME | End: 2022-05-10
Attending: EMERGENCY MEDICINE
Payer: MEDICAID

## 2022-05-10 VITALS
HEIGHT: 64 IN | TEMPERATURE: 98 F | HEART RATE: 76 BPM | SYSTOLIC BLOOD PRESSURE: 110 MMHG | OXYGEN SATURATION: 100 % | BODY MASS INDEX: 26.46 KG/M2 | WEIGHT: 155 LBS | RESPIRATION RATE: 18 BRPM | DIASTOLIC BLOOD PRESSURE: 68 MMHG

## 2022-05-10 DIAGNOSIS — O20.9 VAGINAL BLEEDING IN PREGNANCY, FIRST TRIMESTER: Primary | ICD-10-CM

## 2022-05-10 DIAGNOSIS — R11.2 NON-INTRACTABLE VOMITING WITH NAUSEA, UNSPECIFIED VOMITING TYPE: ICD-10-CM

## 2022-05-10 LAB
BACTERIA #/AREA URNS HPF: ABNORMAL /HPF
BILIRUB UR QL STRIP: NEGATIVE
CLARITY UR: CLEAR
COLOR UR: YELLOW
GLUCOSE UR STRIP-MCNC: NEGATIVE MG/DL
KETONES UR STRIP-SCNC: NEGATIVE MG/DL
LEUKOCYTE ESTERASE UR QL STRIP: ABNORMAL
NITRITE UR QL STRIP: NEGATIVE
PH UR STRIP: 6 PH UNITS
PROT UR QL STRIP: NEGATIVE
RBC # UR STRIP: ABNORMAL /UL
RBC #/AREA URNS HPF: ABNORMAL /HPF
SP GR UR STRIP: 1.01
SQUAMOUS #/AREA URNS LPF: ABNORMAL /LPF
UROBILINOGEN UR STRIP-ACNC: 0.2 MG/DL
WBC #/AREA URNS HPF: ABNORMAL /HPF

## 2022-05-10 PROCEDURE — 81001 URINALYSIS AUTO W/SCOPE: CPT | Performed by: EMERGENCY MEDICINE

## 2022-05-10 PROCEDURE — 99283 PR EMERGENCY DEPT VISIT,LEVEL III: ICD-10-PCS | Mod: ,,, | Performed by: EMERGENCY MEDICINE

## 2022-05-10 PROCEDURE — 99283 EMERGENCY DEPT VISIT LOW MDM: CPT | Mod: ,,, | Performed by: EMERGENCY MEDICINE

## 2022-05-10 PROCEDURE — 99283 EMERGENCY DEPT VISIT LOW MDM: CPT

## 2022-05-10 NOTE — ED PROVIDER NOTES
Encounter Date: 5/10/2022    SCRIBE #1 NOTE: I, Chasidy Castanon, am scribing for, and in the presence of,  Sergio Clifford MD. I have scribed the entire note.       History     Chief Complaint   Patient presents with    Vaginal Bleeding     Patient is a 26 year old female who presents to the emergency department complaining of vaginal spotting and abdominal cramping. Patient is 7 weeks pregnant and reports that she has had multiple miscarriages in the past and is concerned due to the bleeding. Patient reports some vaginal discharge as well. Patient had a normal ultrasound last week. She reports having what she assumed was a yeast infection last week but denies any current dysuria or foul smelling urine. Patient has been experiencing nausea and vomiting throughout her pregnancy and is not on any medications for such. No other symptoms were reported.     The history is provided by the patient. No  was used.     Review of patient's allergies indicates:   Allergen Reactions    Macrobid [nitrofurantoin monohyd/m-cryst] Hives    Penicillins Hives, Itching, Rash and Swelling     Past Medical History:   Diagnosis Date    Mental disorder      Past Surgical History:   Procedure Laterality Date    DILATION AND CURETTAGE OF UTERUS N/A 2/22/2022    Procedure: DILATION AND CURETTAGE, UTERUS;  Surgeon: Abdiel Ding DO;  Location[facility];  Service: OB/GYN;  Laterality: N/A;    HYSTEROSCOPY N/A 2/22/2022    Procedure: HYSTEROSCOPY, DIAGNOSTIC LAPAROSCOPY, CHROMOTUBATION;  Surgeon: Abdiel Ding DO;  Location: [facility] OR;  Service: OB/GYN;  Laterality: N/A;    LAPAROSCOPY N/A 2/22/2022    Procedure: LAPAROSCOPY;  Surgeon: Abdiel Ding DO;  Location: [facility] OR;  Service: OB/GYN;  Laterality: N/A;    miscarriage      x 4     TONSILLECTOMY      TYMPANOPLASTY      x4      Family History   Problem Relation Age of Onset    Stroke Mother      Social History     Tobacco Use     Smoking status: Current Every Day Smoker     Types: Vaping with nicotine    Smokeless tobacco: Never Used   Substance Use Topics    Alcohol use: Never    Drug use: Never     Review of Systems   Constitutional: Negative.    HENT: Negative.    Eyes: Negative.    Respiratory: Negative.    Cardiovascular: Negative.    Gastrointestinal: Positive for abdominal pain (cramping), nausea (through pregnancy) and vomiting.   Endocrine: Negative.    Genitourinary: Positive for vaginal discharge. Negative for dysuria.        Vaginal spotting   Musculoskeletal: Negative.    Skin: Negative.    Allergic/Immunologic: Negative.    Neurological: Negative.    Hematological: Negative.    Psychiatric/Behavioral: Negative.    All other systems reviewed and are negative.      Physical Exam     Initial Vitals [05/10/22 1832]   BP Pulse Resp Temp SpO2   110/68 76 18 98 °F (36.7 °C) 100 %      MAP       --         Physical Exam    Nursing note and vitals reviewed.  HENT:   Head: Normocephalic and atraumatic.   Mouth/Throat: Oropharynx is clear and moist.   Eyes: Pupils are equal, round, and reactive to light.   Neck: Neck supple.   Normal range of motion.  Cardiovascular: Normal rate and regular rhythm.   Pulmonary/Chest: Effort normal and breath sounds normal.   Abdominal: Abdomen is soft. She exhibits no distension.   Musculoskeletal:         General: Normal range of motion.      Cervical back: Normal range of motion and neck supple.     Neurological: She is alert.   Skin: Skin is warm. Capillary refill takes less than 2 seconds.   Psychiatric: She has a normal mood and affect.         ED Course   Procedures  Labs Reviewed   URINALYSIS, REFLEX TO URINE CULTURE - Abnormal; Notable for the following components:       Result Value    Leukocytes, UA Small (*)     Blood, UA Small (*)     All other components within normal limits   URINALYSIS, MICROSCOPIC - Abnormal; Notable for the following components:    Squamous Epithelial Cells, UA Few  (*)     All other components within normal limits          Imaging Results    None          Medications - No data to display             Attending Attestation:           Physician Attestation for Scribe:  Physician Attestation Statement for Scribe #1: I, Sergio Clifford MD, reviewed documentation, as scribed by Chasidy Castanon in my presence, and it is both accurate and complete.             ED Course as of 05/10/22 1951   Tue May 10, 2022   1949 WBC, UA: 0-5 [BB]   1949 RBC, UA: None Seen [BB]   1949 NITRITE UA: Negative [BB]   1949 Protein, UA: Negative [BB]   1949 Leukocytes, UA(!): Small [BB]      ED Course User Index  [BB] Sergio Clifford MD             Clinical Impression:   Final diagnoses:  [O20.9] Vaginal bleeding in pregnancy, first trimester (Primary)  [R11.2] Non-intractable vomiting with nausea, unspecified vomiting type          ED Disposition Condition    Discharge Stable        ED Prescriptions     None        Follow-up Information    None          Sergio Clifford MD  05/10/22 1951

## 2022-05-10 NOTE — ED TRIAGE NOTES
Pt presents to ED with c/o abdominal cramping and vaginal bleeding since 1645 today. Pt reports seeing blood clots when using the restroom. Pt reports seeing Dr Ding for pregnancy.

## 2022-05-11 ENCOUNTER — ROUTINE PRENATAL (OUTPATIENT)
Dept: OBSTETRICS AND GYNECOLOGY | Facility: CLINIC | Age: 27
End: 2022-05-11
Payer: MEDICAID

## 2022-05-11 ENCOUNTER — TELEPHONE (OUTPATIENT)
Dept: EMERGENCY MEDICINE | Facility: HOSPITAL | Age: 27
End: 2022-05-11
Payer: MEDICAID

## 2022-05-11 ENCOUNTER — INFUSION (OUTPATIENT)
Dept: INFUSION THERAPY | Facility: HOSPITAL | Age: 27
End: 2022-05-11
Attending: STUDENT IN AN ORGANIZED HEALTH CARE EDUCATION/TRAINING PROGRAM
Payer: MEDICAID

## 2022-05-11 VITALS
WEIGHT: 154.19 LBS | BODY MASS INDEX: 26.47 KG/M2 | DIASTOLIC BLOOD PRESSURE: 70 MMHG | SYSTOLIC BLOOD PRESSURE: 120 MMHG

## 2022-05-11 DIAGNOSIS — Z3A.01 7 WEEKS GESTATION OF PREGNANCY: ICD-10-CM

## 2022-05-11 DIAGNOSIS — O20.9 VAGINAL BLEEDING IN PREGNANCY, FIRST TRIMESTER: Primary | ICD-10-CM

## 2022-05-11 DIAGNOSIS — O26.891 RH NEGATIVE STATUS DURING PREGNANCY IN FIRST TRIMESTER: ICD-10-CM

## 2022-05-11 DIAGNOSIS — Z67.91 RH NEGATIVE STATUS DURING PREGNANCY IN FIRST TRIMESTER: ICD-10-CM

## 2022-05-11 LAB
ABDOMINAL CIRCUMFERENCE: NORMAL
BIPARIETAL DIAMETER: NORMAL
ESTIMATED FETAL WEIGHT: NORMAL
FEMORAL DIAMETER: NORMAL
HC/AC: NORMAL
HEAD CIRCUMFERENCE: NORMAL

## 2022-05-11 PROCEDURE — 99213 PR OFFICE/OUTPT VISIT, EST, LEVL III, 20-29 MIN: ICD-10-PCS | Mod: TH,S$PBB,, | Performed by: STUDENT IN AN ORGANIZED HEALTH CARE EDUCATION/TRAINING PROGRAM

## 2022-05-11 PROCEDURE — 63600519 RHOGAM PHARM REV CODE 636 ALT 250 W HCPCS: Performed by: STUDENT IN AN ORGANIZED HEALTH CARE EDUCATION/TRAINING PROGRAM

## 2022-05-11 PROCEDURE — 96372 THER/PROPH/DIAG INJ SC/IM: CPT

## 2022-05-11 PROCEDURE — 99213 OFFICE O/P EST LOW 20 MIN: CPT | Mod: PBBFAC | Performed by: STUDENT IN AN ORGANIZED HEALTH CARE EDUCATION/TRAINING PROGRAM

## 2022-05-11 PROCEDURE — 99213 OFFICE O/P EST LOW 20 MIN: CPT | Mod: TH,S$PBB,, | Performed by: STUDENT IN AN ORGANIZED HEALTH CARE EDUCATION/TRAINING PROGRAM

## 2022-05-11 RX ADMIN — HUMAN RHO(D) IMMUNE GLOBULIN 300 MCG: 1500 SOLUTION INTRAMUSCULAR; INTRAVENOUS at 01:05

## 2022-05-11 NOTE — DISCHARGE INSTRUCTIONS
Follow-up in clinic with OBGYN doctor as scheduled.  Return to emergency department for increased pain, bleeding, or other worsening or further problems.

## 2022-05-11 NOTE — PROGRESS NOTES
1245 pt arrived for injection.  Orders released and blood bank notified of pts arrival  1305 blood bank can now see orders.  Waiting on medication.  Pt resting in recliner and friend at side  1315 pt tolerated rhogam injection.  Will watch for adverse reactions x15min  1330 pt ambulatory at discharge.  Informed to call physician or go to ED with any problems

## 2022-05-11 NOTE — PROGRESS NOTES
Return OB Office Visit    CC:   Chief Complaint   Patient presents with    Follow-up     Follow up from ER cramping and bleeding          Assessment/Plan:  26 y.o.  at 7w4d (Estimated Date of Delivery: 22) presents for ROSY appointment:    Problem List Items Addressed This Visit        Obstetric    7 weeks gestation of pregnancy    Overview     Estimated Date of Delivery: 22 established by LMP, 6w US consistent  First Trimester  - Prenatal Battery: WNL  - UDS: neg  - Pap Smear: ASCUS, other HR HPV  - GC/C: neg      Second Trimester  - Quad: Plan to offer at 15-19wks.  - Anatomy Ultrasound: Will schedule for 20wks    Third Trimester  - 28 wk labs:   - Rh Status: A neg  - 1Hr GCT:   - GC/C:   - GBS:   - EFW/ASTRID:    Vaccines  - Influenza:  - TDAP: Plan to give around 32wks  - COVID:     Contraception:           Vaginal bleeding in pregnancy, first trimester - Primary    Overview     Bleeding started last night  Evaluated in ED  No exam, labs, US performed  Rhogam not given  US with viable IUP  Rhogam at infusion center today  T&S ordered           Relevant Orders    Type & Screen (Completed)    US OB Transvaginal (Completed)          HPI:  Pt seen and examined. Went to ED last night with bleeding, spotting with wiping now.  Denies LOF, ctx.    Objective:  /70   Wt 69.9 kg (154 lb 3.2 oz)   LMP 2022   BMI 26.47 kg/m²   Gen: AO, NAD  Abd: Soft, NT, gravid measuring below umbilicus  Ext: Bilateral trace LE edema.  No calf tenderness. No erythema.  OMM: Increased lumbar lordosis

## 2022-05-17 ENCOUNTER — PATIENT MESSAGE (OUTPATIENT)
Dept: OBSTETRICS AND GYNECOLOGY | Facility: CLINIC | Age: 27
End: 2022-05-17
Payer: MEDICAID

## 2022-05-17 RX ORDER — PROGESTERONE 200 MG/1
200 CAPSULE ORAL NIGHTLY
Qty: 30 CAPSULE | Refills: 1 | Status: ON HOLD | OUTPATIENT
Start: 2022-05-17 | End: 2022-09-13 | Stop reason: HOSPADM

## 2022-06-01 ENCOUNTER — PATIENT MESSAGE (OUTPATIENT)
Dept: OBSTETRICS AND GYNECOLOGY | Facility: CLINIC | Age: 27
End: 2022-06-01
Payer: MEDICAID

## 2022-06-02 ENCOUNTER — ROUTINE PRENATAL (OUTPATIENT)
Dept: OBSTETRICS AND GYNECOLOGY | Facility: CLINIC | Age: 27
End: 2022-06-02
Payer: MEDICAID

## 2022-06-02 VITALS
DIASTOLIC BLOOD PRESSURE: 80 MMHG | WEIGHT: 156.63 LBS | BODY MASS INDEX: 26.88 KG/M2 | SYSTOLIC BLOOD PRESSURE: 110 MMHG

## 2022-06-02 DIAGNOSIS — O99.611 CONSTIPATION DURING PREGNANCY IN FIRST TRIMESTER: Primary | ICD-10-CM

## 2022-06-02 DIAGNOSIS — Z3A.10 10 WEEKS GESTATION OF PREGNANCY: ICD-10-CM

## 2022-06-02 DIAGNOSIS — K59.00 CONSTIPATION DURING PREGNANCY IN FIRST TRIMESTER: Primary | ICD-10-CM

## 2022-06-02 LAB
BILIRUB SERPL-MCNC: NEGATIVE MG/DL
BLOOD URINE, POC: NEGATIVE
COLOR, POC UA: NORMAL
GLUCOSE UR QL STRIP: NEGATIVE
KETONES UR QL STRIP: NEGATIVE
LEUKOCYTE ESTERASE URINE, POC: NEGATIVE
NITRITE, POC UA: NEGATIVE
PH, POC UA: NORMAL
PROTEIN, POC: NEGATIVE
SPECIFIC GRAVITY, POC UA: NORMAL
UROBILINOGEN, POC UA: NEGATIVE

## 2022-06-02 PROCEDURE — 81003 URINALYSIS AUTO W/O SCOPE: CPT | Mod: PBBFAC | Performed by: STUDENT IN AN ORGANIZED HEALTH CARE EDUCATION/TRAINING PROGRAM

## 2022-06-02 PROCEDURE — 99213 PR OFFICE/OUTPT VISIT, EST, LEVL III, 20-29 MIN: ICD-10-PCS | Mod: S$PBB,TH,, | Performed by: STUDENT IN AN ORGANIZED HEALTH CARE EDUCATION/TRAINING PROGRAM

## 2022-06-02 PROCEDURE — 99213 OFFICE O/P EST LOW 20 MIN: CPT | Mod: S$PBB,TH,, | Performed by: STUDENT IN AN ORGANIZED HEALTH CARE EDUCATION/TRAINING PROGRAM

## 2022-06-02 PROCEDURE — 99213 OFFICE O/P EST LOW 20 MIN: CPT | Mod: PBBFAC | Performed by: STUDENT IN AN ORGANIZED HEALTH CARE EDUCATION/TRAINING PROGRAM

## 2022-06-06 PROBLEM — Z3A.10 10 WEEKS GESTATION OF PREGNANCY: Status: ACTIVE | Noted: 2022-05-06

## 2022-06-06 PROBLEM — K59.00 CONSTIPATION DURING PREGNANCY IN FIRST TRIMESTER: Status: ACTIVE | Noted: 2022-06-06

## 2022-06-06 PROBLEM — O99.611 CONSTIPATION DURING PREGNANCY IN FIRST TRIMESTER: Status: ACTIVE | Noted: 2022-06-06

## 2022-06-07 NOTE — PROGRESS NOTES
Return OB Office Visit    CC:   Chief Complaint   Patient presents with    Routine Prenatal Visit       Assessment/Plan:  26 y.o.  at 10w5d (Estimated Date of Delivery: 22) presents for ROSY appointment:    Problem List Items Addressed This Visit        Obstetric    10 weeks gestation of pregnancy    Overview     Estimated Date of Delivery: 22 established by LMP, 6w US consistent  First Trimester  - Prenatal Battery: WNL  - UDS: neg  - Pap Smear: ASCUS, other HR HPV  - GC/C: neg  - NIPT ordered    Second Trimester  - Quad: Plan to offer at 15-19wks.  - Anatomy Ultrasound: Will schedule for 20wks    Third Trimester  - 28 wk labs:   - Rh Status: A neg  - 1Hr GCT:   - GC/C:   - GBS:   - EFW/ASTRID:    Vaccines  - Influenza:  - TDAP: Plan to give around 32wks  - COVID:     Contraception:           Relevant Orders    POCT URINALYSIS W/O SCOPE (Completed)    Constipation during pregnancy in first trimester - Primary    Overview     Using MoM  Encouraged daily miralax, MoM until regular                 HPI:  Pt seen and examined.  Having constipation.  Denies VB, LOF, ctx.      Objective:  /80   Wt 71 kg (156 lb 9.6 oz)   LMP 2022   BMI 26.88 kg/m²   Gen: AO, NAD  Abd: Soft, NT, gravid measuring below umbilicus  Ext: Bilateral trace LE edema.  No calf tenderness. No erythema.  OMM: Increased lumbar lordosis

## 2022-06-08 LAB
Lab: NORMAL
MONOSOMY X RESULT: NORMAL
TRISOMY 13 RESULT: NORMAL
TRISOMY 18 RESULT: NORMAL
TRISOMY 21 RESULT: NORMAL

## 2022-06-13 ENCOUNTER — PATIENT MESSAGE (OUTPATIENT)
Dept: OBSTETRICS AND GYNECOLOGY | Facility: CLINIC | Age: 27
End: 2022-06-13
Payer: MEDICAID

## 2022-06-13 ENCOUNTER — HOSPITAL ENCOUNTER (EMERGENCY)
Facility: HOSPITAL | Age: 27
Discharge: HOME | End: 2022-06-13
Attending: FAMILY MEDICINE
Payer: MEDICAID

## 2022-06-13 VITALS
RESPIRATION RATE: 18 BRPM | TEMPERATURE: 98 F | HEART RATE: 93 BPM | WEIGHT: 159 LBS | HEIGHT: 64 IN | DIASTOLIC BLOOD PRESSURE: 65 MMHG | SYSTOLIC BLOOD PRESSURE: 106 MMHG | OXYGEN SATURATION: 99 % | BODY MASS INDEX: 27.14 KG/M2

## 2022-06-13 DIAGNOSIS — R42 ORTHOSTATIC DIZZINESS: Primary | ICD-10-CM

## 2022-06-13 DIAGNOSIS — R42 LIGHTHEADEDNESS: ICD-10-CM
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
## 2022-06-13 DIAGNOSIS — S30.1XXA CONTUSION OF ABDOMINAL WALL, INITIAL ENCOUNTER: ICD-10-CM

## 2022-06-13 LAB
ALBUMIN SERPL BCP-MCNC: 3.3 G/DL (ref 3.5–5)
ALBUMIN/GLOB SERPL: 0.9 {RATIO}
ALP SERPL-CCNC: 58 U/L (ref 37–98)
ALT SERPL W P-5'-P-CCNC: 19 U/L (ref 13–56)
ANION GAP SERPL CALCULATED.3IONS-SCNC: 9 MMOL/L (ref 7–16)
AST SERPL W P-5'-P-CCNC: 17 U/L (ref 15–37)
BASOPHILS # BLD AUTO: 0.02 K/UL (ref 0–0.2)
BASOPHILS NFR BLD AUTO: 0.3 % (ref 0–1)
BILIRUB SERPL-MCNC: 0.4 MG/DL (ref 0–1.2)
BILIRUB UR QL STRIP: NEGATIVE
BUN SERPL-MCNC: 8 MG/DL (ref 7–18)
BUN/CREAT SERPL: 10 (ref 6–20)
CALCIUM SERPL-MCNC: 8.8 MG/DL (ref 8.5–10.1)
CHLORIDE SERPL-SCNC: 107 MMOL/L (ref 98–107)
CLARITY UR: CLEAR
CO2 SERPL-SCNC: 26 MMOL/L (ref 21–32)
COLOR UR: YELLOW
CREAT SERPL-MCNC: 0.81 MG/DL (ref 0.55–1.02)
DIFFERENTIAL METHOD BLD: ABNORMAL
EOSINOPHIL # BLD AUTO: 0.21 K/UL (ref 0–0.5)
EOSINOPHIL NFR BLD AUTO: 2.8 % (ref 1–4)
ERYTHROCYTE [DISTWIDTH] IN BLOOD BY AUTOMATED COUNT: 12.7 % (ref 11.5–14.5)
GLOBULIN SER-MCNC: 3.6 G/DL (ref 2–4)
GLUCOSE SERPL-MCNC: 71 MG/DL (ref 74–106)
GLUCOSE UR STRIP-MCNC: NEGATIVE MG/DL
HCT VFR BLD AUTO: 35.6 % (ref 38–47)
HGB BLD-MCNC: 12.4 G/DL (ref 12–16)
IMM GRANULOCYTES # BLD AUTO: 0.02 K/UL (ref 0–0.04)
IMM GRANULOCYTES NFR BLD: 0.3 % (ref 0–0.4)
KETONES UR STRIP-SCNC: NEGATIVE MG/DL
LEUKOCYTE ESTERASE UR QL STRIP: NEGATIVE
LYMPHOCYTES # BLD AUTO: 1.75 K/UL (ref 1–4.8)
LYMPHOCYTES NFR BLD AUTO: 23 % (ref 27–41)
MCH RBC QN AUTO: 30.3 PG (ref 27–31)
MCHC RBC AUTO-ENTMCNC: 34.8 G/DL (ref 32–36)
MCV RBC AUTO: 87 FL (ref 80–96)
MONOCYTES # BLD AUTO: 0.5 K/UL (ref 0–0.8)
MONOCYTES NFR BLD AUTO: 6.6 % (ref 2–6)
MPC BLD CALC-MCNC: 9.9 FL (ref 9.4–12.4)
NEUTROPHILS # BLD AUTO: 5.11 K/UL (ref 1.8–7.7)
NEUTROPHILS NFR BLD AUTO: 67 % (ref 53–65)
NITRITE UR QL STRIP: NEGATIVE
NRBC # BLD AUTO: 0 X10E3/UL
NRBC, AUTO (.00): 0 %
PH UR STRIP: 7 PH UNITS
PLATELET # BLD AUTO: 222 K/UL (ref 150–400)
POTASSIUM SERPL-SCNC: 3.9 MMOL/L (ref 3.5–5.1)
PROT SERPL-MCNC: 6.9 G/DL (ref 6.4–8.2)
PROT UR QL STRIP: NEGATIVE
RBC # BLD AUTO: 4.09 M/UL (ref 4.2–5.4)
RBC # UR STRIP: NEGATIVE /UL
SODIUM SERPL-SCNC: 138 MMOL/L (ref 136–145)
SP GR UR STRIP: 1.02
UROBILINOGEN UR STRIP-ACNC: 1 MG/DL
WBC # BLD AUTO: 7.61 K/UL (ref 4.5–11)

## 2022-06-13 PROCEDURE — 80053 COMPREHEN METABOLIC PANEL: CPT | Performed by: EMERGENCY MEDICINE

## 2022-06-13 PROCEDURE — 81003 URINALYSIS AUTO W/O SCOPE: CPT | Performed by: EMERGENCY MEDICINE

## 2022-06-13 PROCEDURE — 85025 COMPLETE CBC W/AUTO DIFF WBC: CPT | Performed by: EMERGENCY MEDICINE

## 2022-06-13 PROCEDURE — 99283 EMERGENCY DEPT VISIT LOW MDM: CPT | Mod: ,,, | Performed by: EMERGENCY MEDICINE

## 2022-06-13 PROCEDURE — 99285 EMERGENCY DEPT VISIT HI MDM: CPT | Mod: 25

## 2022-06-13 PROCEDURE — 96360 HYDRATION IV INFUSION INIT: CPT

## 2022-06-13 PROCEDURE — 99283 PR EMERGENCY DEPT VISIT,LEVEL III: ICD-10-PCS | Mod: ,,, | Performed by: EMERGENCY MEDICINE

## 2022-06-13 PROCEDURE — 25000003 PHARM REV CODE 250: Performed by: EMERGENCY MEDICINE

## 2022-06-13 PROCEDURE — 36415 COLL VENOUS BLD VENIPUNCTURE: CPT | Performed by: EMERGENCY MEDICINE

## 2022-06-13 RX ORDER — ACETAMINOPHEN 325 MG/1
650 TABLET ORAL
Status: COMPLETED | OUTPATIENT
Start: 2022-06-13 | End: 2022-06-13

## 2022-06-13 RX ADMIN — ACETAMINOPHEN 650 MG: 325 TABLET ORAL at 06:06

## 2022-06-13 RX ADMIN — SODIUM CHLORIDE 1000 ML: 9 INJECTION, SOLUTION INTRAVENOUS at 06:06

## 2022-06-13 NOTE — ED PROVIDER NOTES
Encounter Date: 6/13/2022       History     Chief Complaint   Patient presents with    Fall     Patient complains of a fall at approximately 4:00 p.m. after going up some stairs.  She been outside in the heat and was taking some groceries up the stairs and fell down approximately 4 stairs onto her abdomen.  She has had some mild aching pain to the lower abdominal area.  No associated vaginal bleeding.  Patient is G 8p 2 and is currently 13 weeks pregnant is confirmed by recent ultrasound.  She follows with Dr. Schwarz.  Patient does have a history of lightheadedness especially upon standing and when she is pregnant.  Otherwise no recent illnesses.    No head injury or neck pain.  No extremity pain.  No other associated symptoms modifying factors        Review of patient's allergies indicates:   Allergen Reactions    Macrobid [nitrofurantoin monohyd/m-cryst] Hives    Penicillins Hives, Itching, Rash and Swelling     Past Medical History:   Diagnosis Date    Mental disorder      Past Surgical History:   Procedure Laterality Date    DILATION AND CURETTAGE OF UTERUS N/A 2/22/2022    Procedure: DILATION AND CURETTAGE, UTERUS;  Surgeon: Abdiel Ding DO;  Location: [facility];  Service: OB/GYN;  Laterality: N/A;    HYSTEROSCOPY N/A 2/22/2022    Procedure: HYSTEROSCOPY, DIAGNOSTIC LAPAROSCOPY, CHROMOTUBATION;  Surgeon: Abdiel Ding DO;  Location: [facility] OR;  Service: OB/GYN;  Laterality: N/A;    LAPAROSCOPY N/A 2/22/2022    Procedure: LAPAROSCOPY;  Surgeon: Abdiel Ding DO;  Location: [facility] OR;  Service: OB/GYN;  Laterality: N/A;    miscarriage      x 4     TONSILLECTOMY      TYMPANOPLASTY      x4      Family History   Problem Relation Age of Onset    Stroke Mother      Social History     Tobacco Use    Smoking status: Current Every Day Smoker     Types: Vaping with nicotine    Smokeless tobacco: Never Used   Substance Use Topics    Alcohol use: Never    Drug use:  Never     Review of Systems   Constitutional: Negative for fever.   HENT: Negative for sore throat.    Respiratory: Negative for shortness of breath.    Cardiovascular: Negative for chest pain.   Gastrointestinal: Positive for abdominal pain. Negative for nausea.   Genitourinary: Negative for dysuria.   Musculoskeletal: Negative for back pain.   Skin: Negative for rash.   Neurological: Positive for light-headedness. Negative for weakness.   Hematological: Does not bruise/bleed easily.       Physical Exam     Initial Vitals [06/13/22 1714]   BP Pulse Resp Temp SpO2   106/65 93 18 98 °F (36.7 °C) 99 %      MAP       --         Physical Exam    Constitutional: She appears well-developed and well-nourished.   HENT:   Head: Normocephalic and atraumatic.   Eyes: EOM are normal. Pupils are equal, round, and reactive to light.   Neck: Neck supple.   Normal range of motion.  Cardiovascular: Normal rate and regular rhythm.   Pulmonary/Chest: Breath sounds normal.   Abdominal: Abdomen is soft. There is no abdominal tenderness.   Gravid consistent with dates There is no rebound and no guarding.   Musculoskeletal:         General: Normal range of motion.      Cervical back: Normal range of motion and neck supple.     Neurological: She is alert and oriented to person, place, and time. GCS score is 15. GCS eye subscore is 4. GCS verbal subscore is 5. GCS motor subscore is 6.   Skin: Skin is warm and dry. Capillary refill takes less than 2 seconds.   Psychiatric: She has a normal mood and affect.         Medical Screening Exam   See Full Note    ED Course   Procedures  Labs Reviewed   COMPREHENSIVE METABOLIC PANEL - Abnormal; Notable for the following components:       Result Value    Glucose 71 (*)     Albumin 3.3 (*)     All other components within normal limits   CBC WITH DIFFERENTIAL - Abnormal; Notable for the following components:    RBC 4.09 (*)     Hematocrit 35.6 (*)     Neutrophils % 67.0 (*)     Lymphocytes % 23.0 (*)      Monocytes % 6.6 (*)     All other components within normal limits   URINALYSIS, REFLEX TO URINE CULTURE - Normal   CBC W/ AUTO DIFFERENTIAL    Narrative:     The following orders were created for panel order CBC auto differential.  Procedure                               Abnormality         Status                     ---------                               -----------         ------                     CBC with Differential[[id number]]        Abnormal            Final result                 Please view results for these tests on the individual orders.   EXTRA TUBES    Narrative:     The following orders were created for panel order EXTRA TUBES.  Procedure                               Abnormality         Status                     ---------                               -----------         ------                     Light Blue Top Hold[[id number]]                              In process                 Light Green Top Hold[[id number]]                             In process                 Pink Top Hold[[id number]]                                                                 Please view results for these tests on the individual orders.   LIGHT BLUE TOP HOLD   LIGHT GREEN TOP HOLD          Imaging Results          US OB Limited 1 Or More Gestations (Final result)  Result time 06/13/22 18:43:59   Procedure changed from US OB <14 Wks, TransAbd, Single Gestation     Final result by Gil King MD (06/13/22 18:43:59)                 Impression:      Single live intrauterine fetus detected.  Fetal heart rate measures 163 beats per minute.      Electronically signed by: Gil King  Date:    06/13/2022  Time:    18:43             Narrative:    EXAMINATION:  US OB LIMITED 1 OR MORE GESTATIONS    CLINICAL HISTORY:  fall;  Unspecified fall, initial encounter    TECHNIQUE:  Limited transabdominal grayscale and color Doppler ultrasound performed of the maternal uterus and adnexa.  Ultrasound images captured and  stored.    COMPARISON:  05/11/2022    FINDINGS:  Single live intrauterine gestation identified.  Fetal heart rate detected measuring 163 beats per minute.  No placental abnormality identified on limited views.  No free fluid.                                 Medications   sodium chloride 0.9% bolus 1,000 mL (1,000 mLs Intravenous New Bag 6/13/22 1822)   acetaminophen tablet 650 mg (650 mg Oral Given 6/13/22 1818)                       Clinical Impression:   Final diagnoses:  [W19.XXXA] Fall  [R42] Lightheadedness  [R42] Orthostatic dizziness (Primary)  [S30.1XXA] Contusion of abdominal wall, initial encounter          ED Disposition Condition    Discharge Stable        ED Prescriptions     None        Follow-up Information     Follow up With Specialties Details Why Contact Info    [facility] - Emergency Department Emergency Medicine  If symptoms worsen [address] [place] [id number]  [phone]    Abdiel Ding DO Obstetrics and Gynecology Schedule an appointment as soon as possible for a visit  As needed [address] [place] [address]  [phone]             Laron Tilley MD  06/13/22 9006

## 2022-06-14 NOTE — DISCHARGE INSTRUCTIONS
Drink plenty of fluids.  Try to avoid falls by standing gradually and sitting down or lying down immediately if your feeling lightheaded.  Tylenol as needed.  Activity as tolerated.   Etelvina Sparks  (RN)  2018 19:23:23

## 2022-07-06 ENCOUNTER — PATIENT MESSAGE (OUTPATIENT)
Dept: OBSTETRICS AND GYNECOLOGY | Facility: CLINIC | Age: 27
End: 2022-07-06
Payer: MEDICAID

## 2022-07-06 ENCOUNTER — ROUTINE PRENATAL (OUTPATIENT)
Dept: OBSTETRICS AND GYNECOLOGY | Facility: CLINIC | Age: 27
End: 2022-07-06
Payer: MEDICAID

## 2022-07-06 VITALS — DIASTOLIC BLOOD PRESSURE: 58 MMHG | SYSTOLIC BLOOD PRESSURE: 110 MMHG | WEIGHT: 162 LBS | BODY MASS INDEX: 27.81 KG/M2

## 2022-07-06 DIAGNOSIS — R51.9 PREGNANCY HEADACHE IN SECOND TRIMESTER: ICD-10-CM

## 2022-07-06 DIAGNOSIS — Z36.9 ENCOUNTER FOR ANTENATAL SCREENING: ICD-10-CM

## 2022-07-06 DIAGNOSIS — O26.892 PREGNANCY HEADACHE IN SECOND TRIMESTER: ICD-10-CM

## 2022-07-06 DIAGNOSIS — O09.291 HISTORY OF MISCARRIAGE, CURRENTLY PREGNANT, FIRST TRIMESTER: ICD-10-CM

## 2022-07-06 DIAGNOSIS — Z3A.15 15 WEEKS GESTATION OF PREGNANCY: Primary | ICD-10-CM

## 2022-07-06 LAB
BILIRUB SERPL-MCNC: NORMAL MG/DL
BLOOD URINE, POC: NORMAL
COLOR, POC UA: YELLOW
GLUCOSE UR QL STRIP: NORMAL
KETONES UR QL STRIP: NORMAL
LEUKOCYTE ESTERASE URINE, POC: NORMAL
NITRITE, POC UA: NORMAL
PH, POC UA: 5
PROTEIN, POC: NORMAL
SPECIFIC GRAVITY, POC UA: 1.03
UROBILINOGEN, POC UA: 0.2

## 2022-07-06 PROCEDURE — 59425 ANTEPARTUM CARE ONLY: CPT | Mod: S$PBB,TH,, | Performed by: STUDENT IN AN ORGANIZED HEALTH CARE EDUCATION/TRAINING PROGRAM

## 2022-07-06 PROCEDURE — 81003 URINALYSIS AUTO W/O SCOPE: CPT | Mod: PBBFAC | Performed by: STUDENT IN AN ORGANIZED HEALTH CARE EDUCATION/TRAINING PROGRAM

## 2022-07-06 PROCEDURE — 59425 PR ANTEPARTUM CARE ONLY, 4-6 VISITS: ICD-10-PCS | Mod: S$PBB,TH,, | Performed by: STUDENT IN AN ORGANIZED HEALTH CARE EDUCATION/TRAINING PROGRAM

## 2022-07-06 PROCEDURE — 99213 OFFICE O/P EST LOW 20 MIN: CPT | Mod: PBBFAC | Performed by: STUDENT IN AN ORGANIZED HEALTH CARE EDUCATION/TRAINING PROGRAM

## 2022-07-06 RX ORDER — LANOLIN ALCOHOL/MO/W.PET/CERES
400 CREAM (GRAM) TOPICAL DAILY
Qty: 30 TABLET | Refills: 11 | Status: SHIPPED | OUTPATIENT
Start: 2022-07-06 | End: 2022-09-14

## 2022-07-06 RX ORDER — BUTALBITAL, ACETAMINOPHEN AND CAFFEINE 50; 325; 40 MG/1; MG/1; MG/1
1 TABLET ORAL EVERY 6 HOURS PRN
Qty: 20 TABLET | Refills: 1 | Status: SHIPPED | OUTPATIENT
Start: 2022-07-06 | End: 2022-08-05

## 2022-07-06 NOTE — PROGRESS NOTES
Subjective:      Rebekah Gregg is a 26 y.o. female being seen today for her obstetrical visit. She is at 15w4d gestation. Patient reports headache. Fetal movement: some flutters .    Menstrual History:  OB History        7    Para   2    Term   2            AB   4    Living   2       SAB   4    IAB        Ectopic        Multiple        Live Births   2                  Review of Systems  Pertinent items are noted in HPI.     Objective:       BP (!) 110/58   Wt 73.5 kg (162 lb)   LMP 2022   BMI 27.81 kg/m²   Uterine Size: size equals dates     FHTs 155    Assessment:      Pregnancy 15 and 4/7 weeks    Headaches    Plan:   A he is on her her S for  Quad screen, anatomy US ordered  Follow up in 4 weeks.

## 2022-07-08 PROBLEM — Z3A.15 15 WEEKS GESTATION OF PREGNANCY: Status: ACTIVE | Noted: 2022-05-06

## 2022-07-08 PROBLEM — R51.9 PREGNANCY HEADACHE IN SECOND TRIMESTER: Status: ACTIVE | Noted: 2022-07-08

## 2022-07-08 PROBLEM — O26.892 PREGNANCY HEADACHE IN SECOND TRIMESTER: Status: ACTIVE | Noted: 2022-07-08

## 2022-07-11 DIAGNOSIS — O28.0 ABNORMAL QUAD SCREEN: Primary | ICD-10-CM

## 2022-07-18 ENCOUNTER — PATIENT MESSAGE (OUTPATIENT)
Dept: OBSTETRICS AND GYNECOLOGY | Facility: CLINIC | Age: 27
End: 2022-07-18
Payer: MEDICAID

## 2022-07-31 ENCOUNTER — HOSPITAL ENCOUNTER (OUTPATIENT)
Facility: HOSPITAL | Age: 27
Discharge: HOME | End: 2022-07-31
Attending: OBSTETRICS & GYNECOLOGY | Admitting: OBSTETRICS & GYNECOLOGY
Payer: MEDICAID

## 2022-07-31 VITALS
BODY MASS INDEX: 28.06 KG/M2 | HEART RATE: 93 BPM | DIASTOLIC BLOOD PRESSURE: 63 MMHG | TEMPERATURE: 98 F | SYSTOLIC BLOOD PRESSURE: 113 MMHG | WEIGHT: 164.38 LBS | OXYGEN SATURATION: 99 % | HEIGHT: 64 IN | RESPIRATION RATE: 20 BRPM

## 2022-07-31 DIAGNOSIS — Z34.90 PREGNANCY: ICD-10-CM

## 2022-07-31 PROBLEM — Z3A.19 19 WEEKS GESTATION OF PREGNANCY: Status: ACTIVE | Noted: 2022-07-31

## 2022-07-31 PROBLEM — R10.2 PAIN OF ROUND LIGAMENT COMPLICATING PREGNANCY, ANTEPARTUM: Status: ACTIVE | Noted: 2022-07-31

## 2022-07-31 PROBLEM — O26.899 PAIN OF ROUND LIGAMENT COMPLICATING PREGNANCY, ANTEPARTUM: Status: ACTIVE | Noted: 2022-07-31

## 2022-07-31 LAB
BILIRUB UR QL STRIP: NEGATIVE
CLARITY UR: CLEAR
COLOR UR: COLORLESS
GLUCOSE UR STRIP-MCNC: NORMAL MG/DL
KETONES UR STRIP-SCNC: NEGATIVE MG/DL
LEUKOCYTE ESTERASE UR QL STRIP: NEGATIVE
NITRITE UR QL STRIP: NEGATIVE
PH UR STRIP: 6 PH UNITS
PROT UR QL STRIP: NEGATIVE
RBC # UR STRIP: NEGATIVE /UL
SP GR UR STRIP: 1.01
UROBILINOGEN UR STRIP-ACNC: NORMAL MG/DL

## 2022-07-31 PROCEDURE — 81003 URINALYSIS AUTO W/O SCOPE: CPT | Performed by: OBSTETRICS & GYNECOLOGY

## 2022-07-31 PROCEDURE — 99211 OFF/OP EST MAY X REQ PHY/QHP: CPT

## 2022-07-31 NOTE — DISCHARGE INSTRUCTIONS
Keep all f/u appointments as scheduled. Return to the hospital if you have bright red vaginal bleeding or constant abdominal pain that will not subside. If you have any questions or concerns, call [facility] at [phone].

## 2022-08-01 NOTE — PROGRESS NOTES
[facility] -  Labor and Delivery  Obstetrics  Antepartum Progress Note    Patient Name: Rebekah Gregg  MRN: [id number]  Admission Date: 2022  Hospital Length of Stay: 0 days  Attending Physician: No att. providers found  Primary Care Provider: Primary Doctor No    Subjective:     Principal Problem:Pain of round ligament complicating pregnancy, antepartum    HPI:  Patient is a 26-year-old  7 para  who presents at 19 weeks and 1 day with complaints of perineal pressure and round ligament discomfort.  She denies any uterine contractions.  Pain is relieved by getting off of her feet.    Obstetric HPI:  Patient reports None contractions, active fetal movement, absent vaginal bleeding , absent loss of fluid      Objective:     Vital Signs (Most Recent):  Temp: 98.3 °F (36.8 °C) (22 1430)  Pulse: 93 (22 1430)  Resp: 20 (22 1430)  BP: 113/63 (22 1430)  SpO2: 99 % (22 1430) Vital Signs (24h Range):  Temp:  [98.3 °F (36.8 °C)] 98.3 °F (36.8 °C)  Pulse:  [93] 93  Resp:  [20] 20  SpO2:  [99 %] 99 %  BP: (113)/(63) 113/63     Weight: 74.6 kg (164 lb 6.4 oz)  Body mass index is 28.22 kg/m².    FHT: 140  Cat 0 (reassuring)  TOCO:  Q 0 minutes    No intake or output data in the 24 hours ending 22 2327    Physical Exam    Cervical Exam:  Dilation:  0  Effacement:  0%  Station: -3  Presentation: unclear     Significant Labs:  Recent Lab Results       22  1450        Appearance, UA Clear       Bilirubin (UA) Negative       Color, UA Colorless       Glucose, UA Normal       Ketones, UA Negative       Leukocytes, UA Negative       NITRITE UA Negative       Occult Blood UA Negative       pH, UA 6.0       Protein, UA Negative       Specific Gravity, UA 1.006       UROBILINOGEN UA Normal             Assessment/Plan:     26 y.o. female  at 19w1d for:    Active Diagnoses:    Diagnosis Date Noted POA    PRINCIPAL PROBLEM:  Pain of round ligament complicating  pregnancy, antepartum [O26.899, R10.2] 07/31/2022 Yes    19 weeks gestation of pregnancy [Z3A.19] 07/31/2022 Not Applicable      Problems Resolved During this Admission:      recommend wearing a maternity support belt      Desmond Lisa MD  Obstetrics  [facility] -  Labor and Delivery

## 2022-08-08 ENCOUNTER — HOSPITAL ENCOUNTER (OUTPATIENT)
Dept: RADIOLOGY | Facility: HOSPITAL | Age: 27
Discharge: HOME | End: 2022-08-08
Attending: STUDENT IN AN ORGANIZED HEALTH CARE EDUCATION/TRAINING PROGRAM
Payer: MEDICAID

## 2022-08-08 ENCOUNTER — ROUTINE PRENATAL (OUTPATIENT)
Dept: OBSTETRICS AND GYNECOLOGY | Facility: CLINIC | Age: 27
End: 2022-08-08
Payer: MEDICAID

## 2022-08-08 VITALS
DIASTOLIC BLOOD PRESSURE: 78 MMHG | BODY MASS INDEX: 28.46 KG/M2 | SYSTOLIC BLOOD PRESSURE: 120 MMHG | WEIGHT: 165.81 LBS

## 2022-08-08 DIAGNOSIS — R89.9 ABNORMAL LABORATORY TEST RESULT: ICD-10-CM

## 2022-08-08 DIAGNOSIS — F41.9 ANXIETY DURING PREGNANCY, ANTEPARTUM, SECOND TRIMESTER: Primary | ICD-10-CM

## 2022-08-08 DIAGNOSIS — Z3A.20 20 WEEKS GESTATION OF PREGNANCY: ICD-10-CM

## 2022-08-08 DIAGNOSIS — O99.342 ANXIETY DURING PREGNANCY, ANTEPARTUM, SECOND TRIMESTER: Primary | ICD-10-CM

## 2022-08-08 DIAGNOSIS — O09.291 HISTORY OF MISCARRIAGE, CURRENTLY PREGNANT, FIRST TRIMESTER: ICD-10-CM

## 2022-08-08 LAB
BILIRUB SERPL-MCNC: ABNORMAL MG/DL
BLOOD URINE, POC: ABNORMAL
COLOR, POC UA: YELLOW
GLUCOSE UR QL STRIP: ABNORMAL
KETONES UR QL STRIP: ABNORMAL
LEUKOCYTE ESTERASE URINE, POC: ABNORMAL
NITRITE, POC UA: ABNORMAL
PH, POC UA: 6
PROTEIN, POC: ABNORMAL
SPECIFIC GRAVITY, POC UA: 1.02
UROBILINOGEN, POC UA: 0.2

## 2022-08-08 PROCEDURE — 99213 OFFICE O/P EST LOW 20 MIN: CPT | Mod: PBBFAC,25 | Performed by: STUDENT IN AN ORGANIZED HEALTH CARE EDUCATION/TRAINING PROGRAM

## 2022-08-08 PROCEDURE — 81003 URINALYSIS AUTO W/O SCOPE: CPT | Mod: PBBFAC | Performed by: STUDENT IN AN ORGANIZED HEALTH CARE EDUCATION/TRAINING PROGRAM

## 2022-08-08 PROCEDURE — 59425 PR ANTEPARTUM CARE ONLY, 4-6 VISITS: ICD-10-PCS | Mod: TH,,, | Performed by: STUDENT IN AN ORGANIZED HEALTH CARE EDUCATION/TRAINING PROGRAM

## 2022-08-08 PROCEDURE — 87086 CULTURE, URINE: ICD-10-PCS | Mod: ,,, | Performed by: CLINICAL MEDICAL LABORATORY

## 2022-08-08 PROCEDURE — 76805 OB US >/= 14 WKS SNGL FETUS: CPT | Mod: 26,,, | Performed by: RADIOLOGY

## 2022-08-08 PROCEDURE — 59425 ANTEPARTUM CARE ONLY: CPT | Mod: TH,,, | Performed by: STUDENT IN AN ORGANIZED HEALTH CARE EDUCATION/TRAINING PROGRAM

## 2022-08-08 PROCEDURE — 76805 OB US >/= 14 WKS SNGL FETUS: CPT | Mod: TC

## 2022-08-08 PROCEDURE — 76805 US OB 14+ WEEKS, TRANSABDOM, SINGLE GESTATION: ICD-10-PCS | Mod: 26,,, | Performed by: RADIOLOGY

## 2022-08-08 PROCEDURE — 87086 URINE CULTURE/COLONY COUNT: CPT | Mod: ,,, | Performed by: CLINICAL MEDICAL LABORATORY

## 2022-08-08 NOTE — PROGRESS NOTES
Subjective:      Rebekah Gregg is a 26 y.o. female being seen today for her obstetrical visit. She is at 20w3d gestation. Patient reports no complaints. Fetal movement: normal.    Menstrual History:  OB History        7    Para   2    Term   2            AB   4    Living   2       SAB   4    IAB        Ectopic        Multiple        Live Births   2               The following portions of the patient's history were reviewed and updated as appropriate: allergies, current medications, past family history, past medical history, past social history, past surgical history and problem list.    Review of Systems  Pertinent items are noted in HPI.     Objective:      /78   Wt 75.2 kg (165 lb 12.8 oz)   LMP 2022   BMI 28.46 kg/m²   FHT: 145 BPM   Uterine Size: 20 cm        Assessment:      Pregnancy 20 and 2/7 weeks    Abnormal quad screen    Plan:      MFM referral  Follow up in 4 weeks.

## 2022-08-10 LAB — UA COMPLETE W REFLEX CULTURE PNL UR: NORMAL

## 2022-08-18 ENCOUNTER — HOSPITAL ENCOUNTER (OUTPATIENT)
Facility: HOSPITAL | Age: 27
Discharge: HOME | End: 2022-08-18
Attending: OBSTETRICS & GYNECOLOGY | Admitting: OBSTETRICS & GYNECOLOGY
Payer: MEDICAID

## 2022-08-18 VITALS
SYSTOLIC BLOOD PRESSURE: 111 MMHG | DIASTOLIC BLOOD PRESSURE: 57 MMHG | HEART RATE: 78 BPM | TEMPERATURE: 98 F | RESPIRATION RATE: 18 BRPM

## 2022-08-18 DIAGNOSIS — Z34.90 PREGNANCY: ICD-10-CM

## 2022-08-18 PROBLEM — O23.42 URINARY TRACT INFECTION AFFECTING CARE OF MOTHER IN SECOND TRIMESTER, ANTEPARTUM: Status: ACTIVE | Noted: 2022-08-18

## 2022-08-18 PROBLEM — Z3A.21 21 WEEKS GESTATION OF PREGNANCY: Status: ACTIVE | Noted: 2022-05-06

## 2022-08-18 LAB
BACTERIA #/AREA URNS HPF: ABNORMAL /HPF
BILIRUB UR QL STRIP: NEGATIVE
CLARITY UR: ABNORMAL
COLOR UR: COLORLESS
GLUCOSE UR STRIP-MCNC: NORMAL MG/DL
KETONES UR STRIP-SCNC: NEGATIVE MG/DL
LEUKOCYTE ESTERASE UR QL STRIP: ABNORMAL
NITRITE UR QL STRIP: NEGATIVE
PH UR STRIP: 6 PH UNITS
PROT UR QL STRIP: NEGATIVE
RBC # UR STRIP: NEGATIVE /UL
RBC #/AREA URNS HPF: ABNORMAL /[HPF]
SP GR UR STRIP: 1
SQUAMOUS #/AREA URNS LPF: ABNORMAL /LPF
UROBILINOGEN UR STRIP-ACNC: NORMAL MG/DL
WBC #/AREA URNS HPF: ABNORMAL /HPF

## 2022-08-18 PROCEDURE — 87086 URINE CULTURE/COLONY COUNT: CPT | Performed by: OBSTETRICS & GYNECOLOGY

## 2022-08-18 PROCEDURE — 25000003 PHARM REV CODE 250: Performed by: OBSTETRICS & GYNECOLOGY

## 2022-08-18 PROCEDURE — 81001 URINALYSIS AUTO W/SCOPE: CPT | Performed by: OBSTETRICS & GYNECOLOGY

## 2022-08-18 PROCEDURE — 99211 OFF/OP EST MAY X REQ PHY/QHP: CPT | Mod: 25

## 2022-08-18 PROCEDURE — 96372 THER/PROPH/DIAG INJ SC/IM: CPT

## 2022-08-18 PROCEDURE — 63600175 PHARM REV CODE 636 W HCPCS: Performed by: OBSTETRICS & GYNECOLOGY

## 2022-08-18 RX ADMIN — LIDOCAINE HYDROCHLORIDE 1 G: 10 INJECTION, SOLUTION INFILTRATION; PERINEURAL at 12:08

## 2022-08-18 NOTE — SUBJECTIVE & OBJECTIVE
Obstetric HPI:  Patient reports None contractions, active fetal movement, No vaginal bleeding , No loss of fluid     This pregnancy has been complicated by see PNR no changes    OB History    Para Term  AB Living   7 2 2 0 4 2   SAB IAB Ectopic Multiple Live Births   4 0 0 0 2      # Outcome Date GA Lbr Jv/2nd Weight Sex Delivery Anes PTL Lv   7 Current            6 SAB 2021           5 SAB            4 SAB            3 SAB            2 Term            1 Term              Past Medical History:   Diagnosis Date    Mental disorder      Past Surgical History:   Procedure Laterality Date    DILATION AND CURETTAGE OF UTERUS N/A 2022    Procedure: DILATION AND CURETTAGE, UTERUS;  Surgeon: Abdiel Ding DO;  Location: [facility];  Service: OB/GYN;  Laterality: N/A;    HYSTEROSCOPY N/A 2022    Procedure: HYSTEROSCOPY, DIAGNOSTIC LAPAROSCOPY, CHROMOTUBATION;  Surgeon: Abdiel Ding DO;  Location: [facility];  Service: OB/GYN;  Laterality: N/A;    LAPAROSCOPY N/A 2022    Procedure: LAPAROSCOPY;  Surgeon: Abdiel Ding DO;  Location: [facility];  Service: OB/GYN;  Laterality: N/A;    miscarriage      x 4     TONSILLECTOMY      TYMPANOPLASTY      x4        PTA Medications   Medication Sig    magnesium oxide (MAGOX) 400 mg (241.3 mg magnesium) tablet Take 1 tablet (400 mg total) by mouth once daily.    progesterone (PROMETRIUM) 200 MG capsule Take 1 capsule (200 mg total) by mouth every evening.    promethazine (PHENERGAN) 25 MG tablet Take 1 tablet (25 mg total) by mouth every 6 (six) hours as needed for Nausea.    sertraline (ZOLOFT) 100 MG tablet Take 1 tablet (100 mg total) by mouth once daily.       Review of patient's allergies indicates:   Allergen Reactions    Macrobid [nitrofurantoin monohyd/m-cryst] Hives    Penicillins Hives, Itching, Rash and Swelling        Family History       Problem Relation (Age of Onset)    Stroke Mother          Tobacco Use     Smoking status: Current Every Day Smoker     Types: Vaping with nicotine    Smokeless tobacco: Never Used   Substance and Sexual Activity    Alcohol use: Never    Drug use: Never    Sexual activity: Yes     Partners: Male     Review of Systems   Constitutional:  Negative for fatigue and fever.   Respiratory:  Negative for cough and shortness of breath.    Cardiovascular:  Negative for chest pain and leg swelling.   Gastrointestinal:  Negative for abdominal pain, constipation, diarrhea, nausea and vomiting.   Endocrine: Negative for diabetes, hyperthyroidism and hypothyroidism.   Genitourinary:  Positive for dysuria, flank pain and frequency. Negative for genital sores, hematuria, pelvic pain, vaginal bleeding and vaginal discharge.   Musculoskeletal:  Negative for back pain and leg pain.   Integumentary:  Negative for rash and nipple discharge.   Neurological:  Negative for syncope and headaches.   Psychiatric/Behavioral:  Negative for depression. The patient is not nervous/anxious.    Breast: Negative for nipple discharge   Objective:     Vital Signs (Most Recent):    Vital Signs (24h Range):           There is no height or weight on file to calculate BMI.    FHT: 150  TOCO:  Q 0 minutes    Physical Exam:   Constitutional: She is oriented to person, place, and time. She appears well-developed and well-nourished. No distress.    HENT:   Head: Normocephalic and atraumatic.    Eyes: EOM are normal.    Neck: No thyromegaly present.    Cardiovascular:  Normal rate, regular rhythm and normal heart sounds.             Pulmonary/Chest: Effort normal and breath sounds normal.        Abdominal: Soft. Bowel sounds are normal. She exhibits no distension. There is no abdominal tenderness.     Genitourinary:    Vagina normal.   No  no vaginal discharge in the vagina.           Musculoskeletal: Normal range of motion and moves all extremeties. No edema.       Neurological: She is alert and oriented to person, place, and time.  She has normal reflexes.    Skin: Skin is warm and dry.    Psychiatric: She has a normal mood and affect. Her behavior is normal. Judgment and thought content normal.     Cervix:  Dilation:  0  Effacement:  0%  Station: -3       Significant Labs:  Lab Results   Component Value Date    [facility]    HEPBSAG Non-Reactive 05/04/2022    AFP 23.5 07/06/2022       I have personallly reviewed all pertinent lab results from the last 24 hours.

## 2022-08-18 NOTE — H&P
[facility]  Obstetrics  History & Physical    Patient Name: Rebekah Gregg  MRN: [id number]  Admission Date: 2022  Primary Care Provider: Primary Doctor No    Subjective:     Principal Problem:<principal problem not specified>    History of Present Illness:   IUP 21 weeks NAD. Pelvic pain with frequency      Obstetric HPI:  Patient reports None contractions, active fetal movement, No vaginal bleeding , No loss of fluid     This pregnancy has been complicated by see PNR no changes    OB History    Para Term  AB Living   7 2 2 0 4 2   SAB IAB Ectopic Multiple Live Births   4 0 0 0 2      # Outcome Date GA Lbr Jv/2nd Weight Sex Delivery Anes PTL Lv   7 Current            6 SAB 2021           5 SAB            4 SAB            3 SAB            2 Term            1 Term              Past Medical History:   Diagnosis Date    Mental disorder      Past Surgical History:   Procedure Laterality Date    DILATION AND CURETTAGE OF UTERUS N/A 2022    Procedure: DILATION AND CURETTAGE, UTERUS;  Surgeon: Abdiel Ding DO;  Location: [facility];  Service: OB/GYN;  Laterality: N/A;    HYSTEROSCOPY N/A 2022    Procedure: HYSTEROSCOPY, DIAGNOSTIC LAPAROSCOPY, CHROMOTUBATION;  Surgeon: Abdiel Ding DO;  Location: [facility] OR;  Service: OB/GYN;  Laterality: N/A;    LAPAROSCOPY N/A 2022    Procedure: LAPAROSCOPY;  Surgeon: Abdiel Ding DO;  Location: [facility] OR;  Service: OB/GYN;  Laterality: N/A;    miscarriage      x 4     TONSILLECTOMY      TYMPANOPLASTY      x4        PTA Medications   Medication Sig    magnesium oxide (MAGOX) 400 mg (241.3 mg magnesium) tablet Take 1 tablet (400 mg total) by mouth once daily.    progesterone (PROMETRIUM) 200 MG capsule Take 1 capsule (200 mg total) by mouth every evening.    promethazine (PHENERGAN) 25 MG tablet Take 1 tablet (25 mg total) by mouth every 6 (six) hours as needed for  Nausea.    sertraline (ZOLOFT) 100 MG tablet Take 1 tablet (100 mg total) by mouth once daily.       Review of patient's allergies indicates:   Allergen Reactions    Macrobid [nitrofurantoin monohyd/m-cryst] Hives    Penicillins Hives, Itching, Rash and Swelling        Family History       Problem Relation (Age of Onset)    Stroke Mother          Tobacco Use    Smoking status: Current Every Day Smoker     Types: Vaping with nicotine    Smokeless tobacco: Never Used   Substance and Sexual Activity    Alcohol use: Never    Drug use: Never    Sexual activity: Yes     Partners: Male     Review of Systems   Constitutional:  Negative for fatigue and fever.   Respiratory:  Negative for cough and shortness of breath.    Cardiovascular:  Negative for chest pain and leg swelling.   Gastrointestinal:  Negative for abdominal pain, constipation, diarrhea, nausea and vomiting.   Endocrine: Negative for diabetes, hyperthyroidism and hypothyroidism.   Genitourinary:  Positive for dysuria, flank pain and frequency. Negative for genital sores, hematuria, pelvic pain, vaginal bleeding and vaginal discharge.   Musculoskeletal:  Negative for back pain and leg pain.   Integumentary:  Negative for rash and nipple discharge.   Neurological:  Negative for syncope and headaches.   Psychiatric/Behavioral:  Negative for depression. The patient is not nervous/anxious.    Breast: Negative for nipple discharge   Objective:     Vital Signs (Most Recent):    Vital Signs (24h Range):           There is no height or weight on file to calculate BMI.    FHT: 150  TOCO:  Q 0 minutes    Physical Exam:   Constitutional: She is oriented to person, place, and time. She appears well-developed and well-nourished. No distress.    HENT:   Head: Normocephalic and atraumatic.    Eyes: EOM are normal.    Neck: No thyromegaly present.    Cardiovascular:  Normal rate, regular rhythm and normal heart sounds.             Pulmonary/Chest: Effort normal and  breath sounds normal.        Abdominal: Soft. Bowel sounds are normal. She exhibits no distension. There is no abdominal tenderness.     Genitourinary:    Vagina normal.   No  no vaginal discharge in the vagina.           Musculoskeletal: Normal range of motion and moves all extremeties. No edema.       Neurological: She is alert and oriented to person, place, and time. She has normal reflexes.    Skin: Skin is warm and dry.    Psychiatric: She has a normal mood and affect. Her behavior is normal. Judgment and thought content normal.     Cervix:  Dilation:  0  Effacement:  0%  Station: -3       Significant Labs:  Lab Results   Component Value Date    GROUPTR A NEG 2022    HEPBSAG Non-Reactive 2022    AFP 23.5 2022       I have personallly reviewed all pertinent lab results from the last 24 hours.    Assessment/Plan:     27 y.o. female  at 21w5d for:    Urinary tract infection affecting care of mother in second trimester, antepartum  1 gram rocephin IM  ER precautions for pyelonephritis reviewed   Urine cx sent    21 weeks gestation of pregnancy  PTL precautions reviewed        Minna Hui  Obstetrics  [facility]

## 2022-08-20 LAB — UA COMPLETE W REFLEX CULTURE PNL UR: NORMAL

## 2022-09-07 ENCOUNTER — ROUTINE PRENATAL (OUTPATIENT)
Dept: OBSTETRICS AND GYNECOLOGY | Facility: CLINIC | Age: 27
End: 2022-09-07
Payer: MEDICAID

## 2022-09-07 VITALS
WEIGHT: 172.19 LBS | SYSTOLIC BLOOD PRESSURE: 120 MMHG | DIASTOLIC BLOOD PRESSURE: 80 MMHG | BODY MASS INDEX: 29.56 KG/M2

## 2022-09-07 DIAGNOSIS — O23.42 URINARY TRACT INFECTION AFFECTING CARE OF MOTHER IN SECOND TRIMESTER, ANTEPARTUM: ICD-10-CM

## 2022-09-07 DIAGNOSIS — Z67.91 RH NEGATIVE STATUS DURING PREGNANCY IN SECOND TRIMESTER: Primary | ICD-10-CM

## 2022-09-07 DIAGNOSIS — O26.892 RH NEGATIVE STATUS DURING PREGNANCY IN SECOND TRIMESTER: Primary | ICD-10-CM

## 2022-09-07 DIAGNOSIS — Z3A.24 24 WEEKS GESTATION OF PREGNANCY: ICD-10-CM

## 2022-09-07 LAB
BACTERIA #/AREA URNS HPF: ABNORMAL /HPF
BILIRUB UR QL STRIP: NEGATIVE
CLARITY UR: CLEAR
COLOR UR: YELLOW
GLUCOSE UR STRIP-MCNC: NORMAL MG/DL
KETONES UR STRIP-SCNC: NEGATIVE MG/DL
LEUKOCYTE ESTERASE UR QL STRIP: ABNORMAL
MUCOUS, UA: ABNORMAL /LPF
NITRITE UR QL STRIP: NEGATIVE
PH UR STRIP: 7 PH UNITS
PROT UR QL STRIP: 10
RBC # UR STRIP: NEGATIVE /UL
RBC #/AREA URNS HPF: <1 /HPF
SP GR UR STRIP: 1.02
SQUAMOUS #/AREA URNS LPF: ABNORMAL /HPF
UROBILINOGEN UR STRIP-ACNC: NORMAL MG/DL
WBC #/AREA URNS HPF: 7 /HPF

## 2022-09-07 PROCEDURE — 81001 URINALYSIS, REFLEX TO URINE CULTURE: ICD-10-PCS | Mod: ,,, | Performed by: CLINICAL MEDICAL LABORATORY

## 2022-09-07 PROCEDURE — 59426 ANTEPARTUM CARE ONLY: CPT | Mod: S$PBB,TH,, | Performed by: STUDENT IN AN ORGANIZED HEALTH CARE EDUCATION/TRAINING PROGRAM

## 2022-09-07 PROCEDURE — 81001 URINALYSIS AUTO W/SCOPE: CPT | Mod: ,,, | Performed by: CLINICAL MEDICAL LABORATORY

## 2022-09-07 PROCEDURE — 59426 PR ANTEPARTUM CARE ONLY, >7 VISITS: ICD-10-PCS | Mod: S$PBB,TH,, | Performed by: STUDENT IN AN ORGANIZED HEALTH CARE EDUCATION/TRAINING PROGRAM

## 2022-09-07 PROCEDURE — 99213 OFFICE O/P EST LOW 20 MIN: CPT | Mod: PBBFAC | Performed by: STUDENT IN AN ORGANIZED HEALTH CARE EDUCATION/TRAINING PROGRAM

## 2022-09-07 RX ORDER — SERTRALINE HYDROCHLORIDE 100 MG/1
100 TABLET, FILM COATED ORAL DAILY
Qty: 30 TABLET | Refills: 2 | Status: ON HOLD | OUTPATIENT
Start: 2022-09-07 | End: 2023-02-28

## 2022-09-09 NOTE — PROGRESS NOTES
Subjective:      Rebekah Gregg is a 27 y.o. female being seen today for her obstetrical visit. She is at 24w4d gestation. Patient reports no bleeding, no contractions, no cramping, and no leaking. Fetal movement: normal.    Menstrual History:  OB History          7    Para   2    Term   2            AB   4    Living   2         SAB   4    IAB        Ectopic        Multiple        Live Births   2                  The following portions of the patient's history were reviewed and updated as appropriate: allergies, current medications, past family history, past medical history, past social history, past surgical history, and problem list.    Review of Systems  Pertinent items are noted in HPI.     Objective:      /80   Wt 78.1 kg (172 lb 3.2 oz)   LMP 2022   BMI 29.56 kg/m²   FHT: 148 BPM   Uterine Size: 26 cm        Assessment:      Pregnancy 24 and 4/7 weeks   Anxiety    Plan:      OBGCT: ordered for next visit.  Antibody screen: Rhogam discussed.  Signs and symptoms of  labor: discussed.  Restart zoloft  Follow up in 4 weeks.

## 2022-09-12 ENCOUNTER — HOSPITAL ENCOUNTER (OUTPATIENT)
Facility: HOSPITAL | Age: 27
Discharge: HOME | End: 2022-09-13
Attending: OBSTETRICS & GYNECOLOGY | Admitting: OBSTETRICS & GYNECOLOGY
Payer: MEDICAID

## 2022-09-12 DIAGNOSIS — Z34.90 PREGNANT AND NOT YET DELIVERED: ICD-10-CM

## 2022-09-12 DIAGNOSIS — O21.9 NAUSEA/VOMITING IN PREGNANCY: Primary | ICD-10-CM

## 2022-09-12 LAB
ALBUMIN SERPL BCP-MCNC: 2.8 G/DL (ref 3.5–5)
ALBUMIN/GLOB SERPL: 0.8 {RATIO}
ALP SERPL-CCNC: 70 U/L (ref 37–98)
ALT SERPL W P-5'-P-CCNC: 20 U/L (ref 13–56)
AMYLASE SERPL-CCNC: 91 U/L (ref 25–115)
ANION GAP SERPL CALCULATED.3IONS-SCNC: 14 MMOL/L (ref 7–16)
AST SERPL W P-5'-P-CCNC: 18 U/L (ref 15–37)
BACTERIA #/AREA URNS HPF: ABNORMAL /HPF
BASOPHILS # BLD AUTO: 0.02 K/UL (ref 0–0.2)
BASOPHILS NFR BLD AUTO: 0.3 % (ref 0–1)
BILIRUB SERPL-MCNC: 0.4 MG/DL (ref ?–1.2)
BILIRUB UR QL STRIP: NEGATIVE
BUN SERPL-MCNC: 8 MG/DL (ref 7–18)
BUN/CREAT SERPL: 10 (ref 6–20)
CALCIUM SERPL-MCNC: 8.7 MG/DL (ref 8.5–10.1)
CHLORIDE SERPL-SCNC: 101 MMOL/L (ref 98–107)
CLARITY UR: ABNORMAL
CO2 SERPL-SCNC: 25 MMOL/L (ref 21–32)
COLOR UR: YELLOW
CREAT SERPL-MCNC: 0.81 MG/DL (ref 0.55–1.02)
DIFFERENTIAL METHOD BLD: ABNORMAL
EGFR (NO RACE VARIABLE) (RUSH/TITUS): 102 ML/MIN/1.73M²
EOSINOPHIL # BLD AUTO: 0.1 K/UL (ref 0–0.5)
EOSINOPHIL NFR BLD AUTO: 1.3 % (ref 1–4)
ERYTHROCYTE [DISTWIDTH] IN BLOOD BY AUTOMATED COUNT: 13 % (ref 11.5–14.5)
GLOBULIN SER-MCNC: 3.6 G/DL (ref 2–4)
GLUCOSE SERPL-MCNC: 102 MG/DL (ref 74–106)
GLUCOSE UR STRIP-MCNC: NORMAL MG/DL
HCT VFR BLD AUTO: 31.3 % (ref 38–47)
HGB BLD-MCNC: 10.6 G/DL (ref 12–16)
IMM GRANULOCYTES # BLD AUTO: 0.05 K/UL (ref 0–0.04)
IMM GRANULOCYTES NFR BLD: 0.6 % (ref 0–0.4)
KETONES UR STRIP-SCNC: NEGATIVE MG/DL
LEUKOCYTE ESTERASE UR QL STRIP: ABNORMAL
LIPASE SERPL-CCNC: 164 U/L (ref 73–393)
LYMPHOCYTES # BLD AUTO: 1.56 K/UL (ref 1–4.8)
LYMPHOCYTES NFR BLD AUTO: 19.8 % (ref 27–41)
MCH RBC QN AUTO: 31 PG (ref 27–31)
MCHC RBC AUTO-ENTMCNC: 33.9 G/DL (ref 32–36)
MCV RBC AUTO: 91.5 FL (ref 80–96)
MONOCYTES # BLD AUTO: 0.44 K/UL (ref 0–0.8)
MONOCYTES NFR BLD AUTO: 5.6 % (ref 2–6)
MPC BLD CALC-MCNC: 10.6 FL (ref 9.4–12.4)
MUCOUS THREADS #/AREA URNS HPF: ABNORMAL /HPF
NEUTROPHILS # BLD AUTO: 5.7 K/UL (ref 1.8–7.7)
NEUTROPHILS NFR BLD AUTO: 72.4 % (ref 53–65)
NITRITE UR QL STRIP: NEGATIVE
NRBC # BLD AUTO: 0 X10E3/UL
NRBC, AUTO (.00): 0 %
PH UR STRIP: 8.5 PH UNITS
PLATELET # BLD AUTO: 203 K/UL (ref 150–400)
POTASSIUM SERPL-SCNC: 3.3 MMOL/L (ref 3.5–5.1)
PROT SERPL-MCNC: 6.4 G/DL (ref 6.4–8.2)
PROT UR QL STRIP: 70
RBC # BLD AUTO: 3.42 M/UL (ref 4.2–5.4)
RBC # UR STRIP: NEGATIVE /UL
RBC #/AREA URNS HPF: ABNORMAL /HPF
SODIUM SERPL-SCNC: 137 MMOL/L (ref 136–145)
SP GR UR STRIP: 1.02
SQUAMOUS #/AREA URNS LPF: ABNORMAL /LPF
UROBILINOGEN UR STRIP-ACNC: NORMAL MG/DL
WBC # BLD AUTO: 7.87 K/UL (ref 4.5–11)
WBC #/AREA URNS HPF: ABNORMAL /HPF

## 2022-09-12 PROCEDURE — 81001 URINALYSIS AUTO W/SCOPE: CPT | Performed by: OBSTETRICS & GYNECOLOGY

## 2022-09-12 PROCEDURE — 63600175 PHARM REV CODE 636 W HCPCS: Performed by: OBSTETRICS & GYNECOLOGY

## 2022-09-12 PROCEDURE — 36415 COLL VENOUS BLD VENIPUNCTURE: CPT | Performed by: OBSTETRICS & GYNECOLOGY

## 2022-09-12 PROCEDURE — 82150 ASSAY OF AMYLASE: CPT | Performed by: OBSTETRICS & GYNECOLOGY

## 2022-09-12 PROCEDURE — 80053 COMPREHEN METABOLIC PANEL: CPT | Performed by: OBSTETRICS & GYNECOLOGY

## 2022-09-12 PROCEDURE — 83690 ASSAY OF LIPASE: CPT | Performed by: OBSTETRICS & GYNECOLOGY

## 2022-09-12 PROCEDURE — 25000003 PHARM REV CODE 250: Performed by: OBSTETRICS & GYNECOLOGY

## 2022-09-12 PROCEDURE — 99211 OFF/OP EST MAY X REQ PHY/QHP: CPT

## 2022-09-12 PROCEDURE — 96361 HYDRATE IV INFUSION ADD-ON: CPT

## 2022-09-12 PROCEDURE — 96374 THER/PROPH/DIAG INJ IV PUSH: CPT

## 2022-09-12 PROCEDURE — 25000003 PHARM REV CODE 250: Performed by: STUDENT IN AN ORGANIZED HEALTH CARE EDUCATION/TRAINING PROGRAM

## 2022-09-12 PROCEDURE — 96376 TX/PRO/DX INJ SAME DRUG ADON: CPT

## 2022-09-12 PROCEDURE — 85025 COMPLETE CBC W/AUTO DIFF WBC: CPT | Performed by: OBSTETRICS & GYNECOLOGY

## 2022-09-12 PROCEDURE — 96375 TX/PRO/DX INJ NEW DRUG ADDON: CPT

## 2022-09-12 PROCEDURE — G0378 HOSPITAL OBSERVATION PER HR: HCPCS

## 2022-09-12 PROCEDURE — 87086 URINE CULTURE/COLONY COUNT: CPT | Performed by: OBSTETRICS & GYNECOLOGY

## 2022-09-12 RX ORDER — DEXTROSE, SODIUM CHLORIDE, SODIUM LACTATE, POTASSIUM CHLORIDE, AND CALCIUM CHLORIDE 5; .6; .31; .03; .02 G/100ML; G/100ML; G/100ML; G/100ML; G/100ML
INJECTION, SOLUTION INTRAVENOUS CONTINUOUS
Status: DISCONTINUED | OUTPATIENT
Start: 2022-09-12 | End: 2022-09-13 | Stop reason: HOSPADM

## 2022-09-12 RX ORDER — SODIUM CHLORIDE, SODIUM LACTATE, POTASSIUM CHLORIDE, CALCIUM CHLORIDE 600; 310; 30; 20 MG/100ML; MG/100ML; MG/100ML; MG/100ML
INJECTION, SOLUTION INTRAVENOUS CONTINUOUS
Status: DISCONTINUED | OUTPATIENT
Start: 2022-09-12 | End: 2022-09-12

## 2022-09-12 RX ORDER — PROCHLORPERAZINE EDISYLATE 5 MG/ML
5 INJECTION INTRAMUSCULAR; INTRAVENOUS EVERY 6 HOURS PRN
Status: DISCONTINUED | OUTPATIENT
Start: 2022-09-12 | End: 2022-09-13 | Stop reason: HOSPADM

## 2022-09-12 RX ORDER — HYDROCORTISONE 25 MG/G
CREAM TOPICAL
Status: DISCONTINUED | OUTPATIENT
Start: 2022-09-12 | End: 2022-09-12

## 2022-09-12 RX ORDER — ONDANSETRON 2 MG/ML
4 INJECTION INTRAMUSCULAR; INTRAVENOUS ONCE
Status: COMPLETED | OUTPATIENT
Start: 2022-09-12 | End: 2022-09-12

## 2022-09-12 RX ORDER — ONDANSETRON 4 MG/1
8 TABLET, ORALLY DISINTEGRATING ORAL EVERY 8 HOURS PRN
Status: DISCONTINUED | OUTPATIENT
Start: 2022-09-12 | End: 2022-09-12

## 2022-09-12 RX ORDER — ONDANSETRON 2 MG/ML
4 INJECTION INTRAMUSCULAR; INTRAVENOUS EVERY 8 HOURS
Status: DISCONTINUED | OUTPATIENT
Start: 2022-09-12 | End: 2022-09-13 | Stop reason: HOSPADM

## 2022-09-12 RX ORDER — HYDROCORTISONE 25 MG/G
CREAM TOPICAL
Status: DISCONTINUED | OUTPATIENT
Start: 2022-09-12 | End: 2022-09-13 | Stop reason: HOSPADM

## 2022-09-12 RX ORDER — SERTRALINE HYDROCHLORIDE 50 MG/1
100 TABLET, FILM COATED ORAL NIGHTLY
Status: DISCONTINUED | OUTPATIENT
Start: 2022-09-12 | End: 2022-09-13 | Stop reason: HOSPADM

## 2022-09-12 RX ORDER — FAMOTIDINE 10 MG/ML
20 INJECTION INTRAVENOUS 2 TIMES DAILY
Status: DISCONTINUED | OUTPATIENT
Start: 2022-09-12 | End: 2022-09-13 | Stop reason: HOSPADM

## 2022-09-12 RX ADMIN — ONDANSETRON 4 MG: 2 INJECTION INTRAMUSCULAR; INTRAVENOUS at 11:09

## 2022-09-12 RX ADMIN — SERTRALINE HYDROCHLORIDE 100 MG: 50 TABLET ORAL at 09:09

## 2022-09-12 RX ADMIN — FAMOTIDINE 20 MG: 10 INJECTION, SOLUTION INTRAVENOUS at 09:09

## 2022-09-12 RX ADMIN — POTASSIUM BICARBONATE 20 MEQ: 782 TABLET, EFFERVESCENT ORAL at 01:09

## 2022-09-12 RX ADMIN — SODIUM CHLORIDE, SODIUM LACTATE, POTASSIUM CHLORIDE, CALCIUM CHLORIDE AND DEXTROSE MONOHYDRATE: 5; 600; 310; 30; 20 INJECTION, SOLUTION INTRAVENOUS at 09:09

## 2022-09-12 RX ADMIN — HYDROCORTISONE: 25 CREAM TOPICAL at 08:09

## 2022-09-12 RX ADMIN — SODIUM CHLORIDE, SODIUM LACTATE, POTASSIUM CHLORIDE, CALCIUM CHLORIDE AND DEXTROSE MONOHYDRATE: 5; 600; 310; 30; 20 INJECTION, SOLUTION INTRAVENOUS at 01:09

## 2022-09-12 RX ADMIN — ONDANSETRON 4 MG: 2 INJECTION INTRAMUSCULAR; INTRAVENOUS at 09:09

## 2022-09-12 RX ADMIN — SODIUM CHLORIDE, POTASSIUM CHLORIDE, SODIUM LACTATE AND CALCIUM CHLORIDE: 600; 310; 30; 20 INJECTION, SOLUTION INTRAVENOUS at 11:09

## 2022-09-12 RX ADMIN — FAMOTIDINE 20 MG: 10 INJECTION, SOLUTION INTRAVENOUS at 11:09

## 2022-09-12 NOTE — H&P
Chief Complain - nausea and vomiting since last Wednesday; now with diarrhea    HPI:  [age] year old G 7 P  at 25 and 2/7 WGA presents with complaint of nausea and vomiting since last Wednesday along with diarrhea.  Vomiting has led to the patient not being able to keep any food down since yesterday.    ROS:  + fetal movement; no leakage of fluid; no vaginal bleeding; no contractions; +nausea and vomiting  All other review of systems negative      Past medical history-anxiety;  labor with 2nd pregnancy    Past surgical history-tympanoplasty x4; tonsils and adenoids; diagnostic laparoscopy/hysteroscopy/D&C x 1(menorrhagia); D and C x4 (post D&C and 1 postpartum hemorrhage)    Past OB history- x2 (both term); SAB x4    Past gyn history-no recent STDs or abnormal Paps    Medications-prenatal vitamins; Zoloft    Social history-no alcohol or drugs; +vaping    Allergies-Macrobid    Family history-mother with history of stroke        Vital signs are stable and the patient is afebrile  Fetal heart tones - 140s and category 1; tocometer - no contractions        Physical exam:    General-alert and oriented x3 no acute distress  Chest-clear to auscultation bilaterally  Heart-regular rate and rhythm  HEENT-PERRL; EOMI  Abdomen-soft, nontender, gravid uterus with no fundal tenderness  -no vulvar vaginal lesions; cervix-deferred; no vaginal bleeding or leaking of fluid  Extremities-no lower extremity edema      Laboratories:    Urinalysis-no nitrites; no ketones  WBC-7.8  H&H-10 and 31  Platelets-203    Radiology:    Ultrasound performed at the bedside showed a single intrauterine pregnancy in the transverse position; placenta is posterior fundal; excellent gross fetal movement noted; adequate fluid present with multiple 2 x 2 pockets    Assessment and plan)  1-intrauterine pregnancy at 25 and 2/7 weeks gestational age  2-nausea and vomiting since last Wednesday-most likely secondary to gastroenteritis -  will admit for 23 hour observation, continuous IV fluid's, antiemetic therapy, along with Pepcid for reflux  3-hypokalemia - will replete with oral potassium  4-discussed with Dr. Ding who agrees with above

## 2022-09-13 VITALS
DIASTOLIC BLOOD PRESSURE: 58 MMHG | WEIGHT: 177.19 LBS | OXYGEN SATURATION: 99 % | TEMPERATURE: 98 F | SYSTOLIC BLOOD PRESSURE: 107 MMHG | RESPIRATION RATE: 18 BRPM | HEIGHT: 62 IN | HEART RATE: 69 BPM | BODY MASS INDEX: 32.61 KG/M2

## 2022-09-13 PROBLEM — O21.9 NAUSEA/VOMITING IN PREGNANCY: Status: ACTIVE | Noted: 2022-09-13

## 2022-09-13 LAB — POTASSIUM SERPL-SCNC: 3.5 MMOL/L (ref 3.5–5.1)

## 2022-09-13 PROCEDURE — 36415 COLL VENOUS BLD VENIPUNCTURE: CPT | Performed by: OBSTETRICS & GYNECOLOGY

## 2022-09-13 PROCEDURE — 99211 OFF/OP EST MAY X REQ PHY/QHP: CPT

## 2022-09-13 PROCEDURE — 84132 ASSAY OF SERUM POTASSIUM: CPT | Performed by: OBSTETRICS & GYNECOLOGY

## 2022-09-13 PROCEDURE — 63600175 PHARM REV CODE 636 W HCPCS: Performed by: OBSTETRICS & GYNECOLOGY

## 2022-09-13 PROCEDURE — G0378 HOSPITAL OBSERVATION PER HR: HCPCS

## 2022-09-13 PROCEDURE — 36415 COLL VENOUS BLD VENIPUNCTURE: CPT | Performed by: STUDENT IN AN ORGANIZED HEALTH CARE EDUCATION/TRAINING PROGRAM

## 2022-09-13 PROCEDURE — 96361 HYDRATE IV INFUSION ADD-ON: CPT

## 2022-09-13 PROCEDURE — 96376 TX/PRO/DX INJ SAME DRUG ADON: CPT

## 2022-09-13 RX ORDER — HYDROCORTISONE 25 MG/G
CREAM TOPICAL
Qty: 28 G | Refills: 0 | Status: SHIPPED | OUTPATIENT
Start: 2022-09-13 | End: 2022-09-14

## 2022-09-13 RX ORDER — FAMOTIDINE 20 MG/1
20 TABLET, FILM COATED ORAL 2 TIMES DAILY
Qty: 60 TABLET | Refills: 11 | Status: ON HOLD | OUTPATIENT
Start: 2022-09-13 | End: 2023-02-28

## 2022-09-13 RX ORDER — ONDANSETRON 4 MG/1
4 TABLET, FILM COATED ORAL EVERY 6 HOURS PRN
Qty: 30 TABLET | Refills: 1 | Status: ON HOLD | OUTPATIENT
Start: 2022-09-13 | End: 2022-11-21 | Stop reason: HOSPADM

## 2022-09-13 RX ADMIN — ONDANSETRON 4 MG: 2 INJECTION INTRAMUSCULAR; INTRAVENOUS at 05:09

## 2022-09-13 RX ADMIN — SODIUM CHLORIDE, SODIUM LACTATE, POTASSIUM CHLORIDE, CALCIUM CHLORIDE AND DEXTROSE MONOHYDRATE: 5; 600; 310; 30; 20 INJECTION, SOLUTION INTRAVENOUS at 05:09

## 2022-09-14 ENCOUNTER — OFFICE VISIT (OUTPATIENT)
Dept: FAMILY MEDICINE | Facility: CLINIC | Age: 27
End: 2022-09-14
Payer: MEDICAID

## 2022-09-14 VITALS
OXYGEN SATURATION: 99 % | BODY MASS INDEX: 31.83 KG/M2 | DIASTOLIC BLOOD PRESSURE: 67 MMHG | HEIGHT: 62 IN | WEIGHT: 173 LBS | TEMPERATURE: 97 F | SYSTOLIC BLOOD PRESSURE: 116 MMHG | HEART RATE: 72 BPM | RESPIRATION RATE: 18 BRPM

## 2022-09-14 DIAGNOSIS — H60.391 OTHER INFECTIVE ACUTE OTITIS EXTERNA OF RIGHT EAR: Primary | ICD-10-CM

## 2022-09-14 DIAGNOSIS — J02.9 SORE THROAT: ICD-10-CM

## 2022-09-14 LAB
CTP QC/QA: YES
S PYO RRNA THROAT QL PROBE: NEGATIVE
UA COMPLETE W REFLEX CULTURE PNL UR: NORMAL

## 2022-09-14 PROCEDURE — 3078F DIAST BP <80 MM HG: CPT | Mod: CPTII,,, | Performed by: FAMILY MEDICINE

## 2022-09-14 PROCEDURE — 1160F RVW MEDS BY RX/DR IN RCRD: CPT | Mod: CPTII,,, | Performed by: FAMILY MEDICINE

## 2022-09-14 PROCEDURE — 99213 PR OFFICE/OUTPT VISIT, EST, LEVL III, 20-29 MIN: ICD-10-PCS | Mod: ,,, | Performed by: FAMILY MEDICINE

## 2022-09-14 PROCEDURE — 3074F SYST BP LT 130 MM HG: CPT | Mod: CPTII,,, | Performed by: FAMILY MEDICINE

## 2022-09-14 PROCEDURE — 3008F BODY MASS INDEX DOCD: CPT | Mod: CPTII,,, | Performed by: FAMILY MEDICINE

## 2022-09-14 PROCEDURE — 3078F PR MOST RECENT DIASTOLIC BLOOD PRESSURE < 80 MM HG: ICD-10-PCS | Mod: CPTII,,, | Performed by: FAMILY MEDICINE

## 2022-09-14 PROCEDURE — 3008F PR BODY MASS INDEX (BMI) DOCUMENTED: ICD-10-PCS | Mod: CPTII,,, | Performed by: FAMILY MEDICINE

## 2022-09-14 PROCEDURE — 1160F PR REVIEW ALL MEDS BY PRESCRIBER/CLIN PHARMACIST DOCUMENTED: ICD-10-PCS | Mod: CPTII,,, | Performed by: FAMILY MEDICINE

## 2022-09-14 PROCEDURE — 1159F PR MEDICATION LIST DOCUMENTED IN MEDICAL RECORD: ICD-10-PCS | Mod: CPTII,,, | Performed by: FAMILY MEDICINE

## 2022-09-14 PROCEDURE — 87880 STREP A ASSAY W/OPTIC: CPT | Mod: RHCUB | Performed by: FAMILY MEDICINE

## 2022-09-14 PROCEDURE — 99213 OFFICE O/P EST LOW 20 MIN: CPT | Mod: ,,, | Performed by: FAMILY MEDICINE

## 2022-09-14 PROCEDURE — 1159F MED LIST DOCD IN RCRD: CPT | Mod: CPTII,,, | Performed by: FAMILY MEDICINE

## 2022-09-14 PROCEDURE — 3074F PR MOST RECENT SYSTOLIC BLOOD PRESSURE < 130 MM HG: ICD-10-PCS | Mod: CPTII,,, | Performed by: FAMILY MEDICINE

## 2022-09-14 RX ORDER — NEOMYCIN SULFATE, POLYMYXIN B SULFATE AND HYDROCORTISONE 10; 3.5; 1 MG/ML; MG/ML; [USP'U]/ML
3 SUSPENSION/ DROPS AURICULAR (OTIC) 3 TIMES DAILY
Qty: 10 ML | Refills: 0 | Status: ON HOLD | OUTPATIENT
Start: 2022-09-14 | End: 2022-11-21 | Stop reason: HOSPADM

## 2022-09-14 NOTE — PROGRESS NOTES
Mario Parry MD    [facility]  [address] [place] [address] [place] [address]     PATIENT NAME: Rebekah Gregg  : 1995  DATE: 22  MRN: [id number]      Billing Provider: Mario Parry MD  Level of Service: [place] OFFICE/OUTPT VISIT, EST, LEVL III, 20-29 MIN  Patient PCP Information       Provider PCP Type    Primary Doctor No General            Reason for Visit / Chief Complaint: Sore Throat (Patient complains of sore throat since this morning. )       Update PCP  Update Chief Complaint         History of Present Illness / Problem Focused Workflow     Rebekah Gregg presents to the clinic with Sore Throat (Patient complains of sore throat since this morning. )     Sore Throat   Pertinent negatives include no abdominal pain, congestion, coughing, diarrhea, ear pain, headaches, shortness of breath or vomiting.       Review of Systems     Review of Systems   Constitutional:  Negative for activity change, appetite change, chills, fatigue and fever.   HENT:  Positive for sore throat. Negative for nasal congestion, ear pain, hearing loss and postnasal drip.    Respiratory:  Negative for cough, chest tightness, shortness of breath and wheezing.    Cardiovascular:  Negative for chest pain, palpitations, leg swelling and claudication.   Gastrointestinal:  Negative for abdominal pain, change in bowel habit, constipation, diarrhea, nausea, vomiting and change in bowel habit.   Genitourinary:  Negative for dysuria.   Musculoskeletal:  Negative for arthralgias, back pain, gait problem and myalgias.   Integumentary:  Negative for rash.   Neurological:  Negative for weakness and headaches.   Psychiatric/Behavioral:  Negative for suicidal ideas. The patient is not nervous/anxious.       Medical / Social / Family History     Past Medical History:   Diagnosis Date    Mental disorder        Past Surgical History:   Procedure Laterality Date    DILATION AND CURETTAGE OF UTERUS  N/A 2/22/2022    Procedure: DILATION AND CURETTAGE, UTERUS;  Surgeon: Abdiel Ding DO;  Location: [facility];  Service: OB/GYN;  Laterality: N/A;    HYSTEROSCOPY N/A 2/22/2022    Procedure: HYSTEROSCOPY, DIAGNOSTIC LAPAROSCOPY, CHROMOTUBATION;  Surgeon: Abdiel Ding DO;  Location: [facility] OR;  Service: OB/GYN;  Laterality: N/A;    LAPAROSCOPY N/A 2/22/2022    Procedure: LAPAROSCOPY;  Surgeon: Abdiel Ding DO;  Location: [facility] OR;  Service: OB/GYN;  Laterality: N/A;    miscarriage      x 4     TONSILLECTOMY      TYMPANOPLASTY      x4        Social History  Ms.  reports that she has been smoking vaping with nicotine. She has never used smokeless tobacco. She reports that she does not drink alcohol and does not use drugs.    Family History  Ms.'s family history includes Stroke in her mother.    Medications and Allergies     Medications  Outpatient Medications Marked as Taking for the 9/14/22 encounter (Office Visit) with Mario Parry MD   Medication Sig Dispense Refill    famotidine (PEPCID) 20 MG tablet Take 1 tablet (20 mg total) by mouth 2 (two) times daily. 60 tablet 11    ondansetron (ZOFRAN) 4 MG tablet Take 1 tablet (4 mg total) by mouth every 6 (six) hours as needed for Nausea. 30 tablet 1    sertraline (ZOLOFT) 100 MG tablet Take 1 tablet (100 mg total) by mouth once daily. 30 tablet 2       Allergies  Review of patient's allergies indicates:   Allergen Reactions    Macrobid [nitrofurantoin monohyd/m-cryst] Hives    Penicillins Hives, Itching, Rash and Swelling       Physical Examination     Vitals:    09/14/22 1349   BP: 116/67   Pulse: 72   Resp: 18   Temp: 97.1 °F (36.2 °C)     Physical Exam  Vitals and nursing note reviewed.   Constitutional:       General: She is not in acute distress.     Appearance: Normal appearance. She is not ill-appearing.   HENT:      Right Ear: Tympanic membrane and external ear normal. Swelling and tenderness present.       Left Ear: Tympanic membrane, ear canal and external ear normal.   Eyes:      Extraocular Movements: Extraocular movements intact.      Pupils: Pupils are equal, round, and reactive to light.   Cardiovascular:      Rate and Rhythm: Normal rate and regular rhythm.      Heart sounds: Normal heart sounds.   Pulmonary:      Effort: Pulmonary effort is normal.      Breath sounds: Normal breath sounds.   Abdominal:      General: Bowel sounds are normal.      Palpations: Abdomen is soft.   Musculoskeletal:         General: Normal range of motion.   Skin:     Findings: No rash.   Neurological:      General: No focal deficit present.      Mental Status: She is alert and oriented to person, place, and time. Mental status is at baseline.   Psychiatric:         Mood and Affect: Mood normal.         Behavior: Behavior normal.        Assessment and Plan (including Health Maintenance)      Problem List  Smart Sets  Document Outside HM   :    Plan:         Health Maintenance Due   Topic Date Due    Lipid Panel  Never done    COVID-19 Vaccine (1) Never done    Pneumococcal Vaccines (Age 0-64) (1 - PCV) Never done    TETANUS VACCINE  Never done    Influenza Vaccine (1) Never done       Problem List Items Addressed This Visit          ENT    Sore throat    Relevant Orders    POCT rapid strep A (Completed)     Other Visit Diagnoses       Other infective acute otitis externa of right ear    -  Primary          Other infective acute otitis externa of right ear    Sore throat  -     POCT rapid strep A    Other orders  -     neomycin-polymyxin-hydrocortisone (CORTISPORIN) 3.5-10,000-1 mg/mL-unit/mL-% otic suspension; Place 3 drops into the right ear 3 (three) times daily.  Dispense: 10 mL; Refill: 0     Health Maintenance Topics with due status: Not Due       Topic Last Completion Date    Pap Smear 05/04/2022       Procedures     Future Appointments   Date Time Provider Department Center   10/5/2022  9:15 AM Abdiel Ding DO  [facility]   10/19/2022 11:30 AM Abdiel Bladen Toña, DO [facility]   11/9/2022  9:30 AM Abdiel Bladen [place], DO [facility]   11/16/2022  8:45 AM Abdiel Bladen [place], DO [facility]   11/23/2022  9:45 AM Abdiel Bladen To[place], DO [facility]   12/7/2022  9:30 AM Abdiel Bladen [place], DO [facility]   12/14/2022 10:30 AM Abdiel Bladen [place], DO [facility]        Follow up if symptoms worsen or fail to improve.     Signature:  Mario Parry MD  [address] [place] [address]  Phone #: [phone]  Fax #: [phone]    Date of encounter: 9/14/22    There are no Patient Instructions on file for this visit.

## 2022-09-21 ENCOUNTER — HOSPITAL ENCOUNTER (OUTPATIENT)
Facility: HOSPITAL | Age: 27
Discharge: HOME | End: 2022-09-21
Attending: OBSTETRICS & GYNECOLOGY | Admitting: OBSTETRICS & GYNECOLOGY
Payer: MEDICAID

## 2022-09-21 VITALS
TEMPERATURE: 97 F | BODY MASS INDEX: 31.07 KG/M2 | WEIGHT: 175.38 LBS | RESPIRATION RATE: 20 BRPM | OXYGEN SATURATION: 100 % | HEIGHT: 63 IN

## 2022-09-21 DIAGNOSIS — Z34.90 PREGNANCY: ICD-10-CM

## 2022-09-21 PROBLEM — Z3A.26 26 WEEKS GESTATION OF PREGNANCY: Status: ACTIVE | Noted: 2022-05-06

## 2022-09-21 PROCEDURE — 99211 OFF/OP EST MAY X REQ PHY/QHP: CPT

## 2022-09-21 PROCEDURE — 81003 URINALYSIS AUTO W/O SCOPE: CPT | Performed by: OBSTETRICS & GYNECOLOGY

## 2022-09-22 PROBLEM — R10.2 PELVIC PAIN IN PREGNANCY, ANTEPARTUM, SECOND TRIMESTER: Status: ACTIVE | Noted: 2022-07-08

## 2022-09-22 PROBLEM — O47.02 FALSE LABOR BEFORE 37 COMPLETED WEEKS OF GESTATION DURING PREGNANCY IN SECOND TRIMESTER, ANTEPARTUM: Status: ACTIVE | Noted: 2022-09-22

## 2022-09-22 NOTE — PROGRESS NOTES
[facility] -  Labor and Delivery  Obstetrics  Antepartum Progress Note    Patient Name: Rebekah Gregg  MRN: [id number]  Admission Date: 2022  Hospital Length of Stay: 0 days  Attending Physician: No att. providers found  Primary Care Provider: Primary Doctor No    Subjective:     Principal Problem:False labor before 37 completed weeks of gestation during pregnancy in second trimester, antepartum    HPI:  Patient is a 27-year-old  7 para  who presented at 26 weeks and 5 days with complaints of uterine contractions.  She receives prenatal care from Dr. Ding treated her for dehydration a week ago.  She reports active fetal movement    Obstetric HPI:  Patient reports occasional painful contractions, active fetal movement, absent vaginal bleeding , absent loss of fluid      Objective:     Vital Signs (Most Recent):  Temp: 97.2 °F (36.2 °C) (22)  Resp: 20 (22)  SpO2: 100 % (22) Vital Signs (24h Range):  Temp:  [97.2 °F (36.2 °C)] 97.2 °F (36.2 °C)  Resp:  [20] 20  SpO2:  [100 %] 100 %     Weight: 79.6 kg (175 lb 6.4 oz)  Body mass index is 31.07 kg/m².    FHT: 140  Cat 1 (reassuring)  TOCO:   Occ ctx  No intake or output data in the 24 hours ending 22 0102    Physical Exam:   Constitutional: She is oriented to person, place, and time. She appears well-developed and well-nourished. No distress.    HENT:   Head: Normocephalic and atraumatic.      Cardiovascular:  Normal rate and regular rhythm.             Pulmonary/Chest: Effort normal. No respiratory distress.        Abdominal: Soft. There is no guarding.             Musculoskeletal: Moves all extremeties.       Neurological: She is alert and oriented to person, place, and time.    Skin: Skin is warm.    Psychiatric: She has a normal mood and affect. Her behavior is normal.     Cervical Exam:  Dilation:  1  Effacement:  0%  Station: -3  Presentation: Vertex     Significant Labs:  Recent Lab  Results         22        Appearance, UA Clear       Bilirubin (UA) Negative       Color, UA Colorless       Glucose, UA Normal       Ketones, UA Negative       Leukocytes, UA Negative       NITRITE UA Negative       Occult Blood UA Negative       pH, UA 6.0       Protein, UA Negative       Specific Gravity, UA 1.006       UROBILINOGEN UA Normal               Assessment/Plan:     27 y.o. female  at 26w5d for:    Active Diagnoses:    Diagnosis Date Noted POA    PRINCIPAL PROBLEM:  False labor before 37 completed weeks of gestation during pregnancy in second trimester, antepartum [O47.02] 2022 Yes    Pelvic pain in pregnancy, antepartum, second trimester [O26.892, R10.2] 2022 Unknown    26 weeks gestation of pregnancy [Z3A.26] 2022 Not Applicable      Problems Resolved During this Admission:     Patient is not in labor and does not have a urinary tract infection.  The use of a maternity support belt was discussed at length and in detail.  She has follow-up with Dr. Ding.        Desmond Lisa MD  Obstetrics  [facility] -  Labor and Delivery

## 2022-09-22 NOTE — PLAN OF CARE
Problem:  Fall Injury Risk  Goal: Absence of Fall, Infant Drop and Related Injury  2022 by Danny Knox RN  Outcome: Ongoing, Progressing  2022 by Danny Knox RN  Outcome: Ongoing, Progressing     Problem: Adult Inpatient Plan of Care  Goal: Plan of Care Review  2022 by Danny Knox RN  Outcome: Ongoing, Progressing  2022 by Danny Knox RN  Outcome: Ongoing, Progressing  Goal: Patient-Specific Goal (Individualized)  2022 by Danny Knox RN  Outcome: Ongoing, Progressing  2022 by Danny Knox RN  Outcome: Ongoing, Progressing  Goal: Absence of Hospital-Acquired Illness or Injury  2022 by Danny Knox RN  Outcome: Ongoing, Progressing  2022 by Danny Knox RN  Outcome: Ongoing, Progressing  Goal: Optimal Comfort and Wellbeing  2022 by Danny Knox RN  Outcome: Ongoing, Progressing  2022 by Danny Knox RN  Outcome: Ongoing, Progressing  Goal: Readiness for Transition of Care  2022 by Danny Knox RN  Outcome: Ongoing, Progressing  2022 by Danny Knox RN  Outcome: Ongoing, Progressing

## 2022-10-04 ENCOUNTER — HOSPITAL ENCOUNTER (OUTPATIENT)
Facility: HOSPITAL | Age: 27
Discharge: HOME | End: 2022-10-04
Attending: OBSTETRICS & GYNECOLOGY | Admitting: OBSTETRICS & GYNECOLOGY
Payer: MEDICAID

## 2022-10-04 VITALS
BODY MASS INDEX: 30.32 KG/M2 | OXYGEN SATURATION: 100 % | DIASTOLIC BLOOD PRESSURE: 60 MMHG | HEART RATE: 88 BPM | TEMPERATURE: 97 F | HEIGHT: 64 IN | SYSTOLIC BLOOD PRESSURE: 107 MMHG | WEIGHT: 177.63 LBS | RESPIRATION RATE: 20 BRPM

## 2022-10-04 DIAGNOSIS — Z34.90 PREGNANCY: ICD-10-CM

## 2022-10-04 LAB
BACTERIA #/AREA URNS HPF: ABNORMAL /HPF
BILIRUB UR QL STRIP: NEGATIVE
CLARITY UR: CLEAR
COLOR UR: ABNORMAL
GLUCOSE UR STRIP-MCNC: NORMAL MG/DL
KETONES UR STRIP-SCNC: NEGATIVE MG/DL
LEUKOCYTE ESTERASE UR QL STRIP: ABNORMAL
MUCOUS THREADS #/AREA URNS HPF: ABNORMAL /HPF
NITRITE UR QL STRIP: NEGATIVE
PH UR STRIP: 6.5 PH UNITS
PROT UR QL STRIP: NEGATIVE
RBC # UR STRIP: NEGATIVE /UL
SP GR UR STRIP: 1.01
SQUAMOUS #/AREA URNS LPF: ABNORMAL /LPF
UROBILINOGEN UR STRIP-ACNC: NORMAL MG/DL
WBC #/AREA URNS HPF: ABNORMAL /HPF

## 2022-10-04 PROCEDURE — 99211 OFF/OP EST MAY X REQ PHY/QHP: CPT

## 2022-10-04 PROCEDURE — 81001 URINALYSIS AUTO W/SCOPE: CPT | Performed by: OBSTETRICS & GYNECOLOGY

## 2022-10-04 NOTE — H&P
Chief Complain - contractions    HPI:  [age] year old G 7 P  at 28 and 3/7 WGA presents with complaint of contractions.    ROS:  + fetal movement; no leakage of fluid; no vaginal bleeding; + contractions  All other review of systems negative      Past medical history-anxiety;  labor with 2nd pregnancy    Past surgical history-temp anoplasty x4; tonsils and adenoids; diagnostic laparoscopy/hysteroscopy/D&C x1 (menorrhagia); D and C x4 (post sab D&C and 1 postpartum hemorrhage)    Past OB history- x2 (both term); SAB x4    Past gyn history-no recent STDs or abnormal Paps    Medications-prenatal vitamins; Zoloft    Social history-no alcohol or drugs; +vaping    Allergies-Macrobid    Family history-mother with history of stroke        Vital signs are stable and the patient is afebrile  Fetal heart tones - 130s and reactive/category 1; tocometer - rare contraction        Physical exam:    General-alert and oriented x3 no acute distress  Chest-clear to auscultation bilaterally  Heart-regular rate and rhythm  HEENT-PERRL; EOMI  Abdomen-soft, nontender, gravid uterus with no fundal tenderness  -no vulvar vaginal lesions; cervix-long/thick/closed by sterile spec exam; no vaginal bleeding or leaking of fluid  Extremities-no lower extremity edema      Laboratories:    UA-within normal limits    Radiology:    None    Assessment and plan)  1-intrauterine pregnancy at 28 and 3/7 weeks gestational age  2-occasional  contraction with no signs or symptoms of  labor-will discharge home with  labor precautions; patient to follow-up with her OB as scheduled and return to observation for worsening symptoms or any other problems.

## 2022-10-05 ENCOUNTER — PATIENT MESSAGE (OUTPATIENT)
Dept: OBSTETRICS AND GYNECOLOGY | Facility: CLINIC | Age: 27
End: 2022-10-05
Payer: MEDICAID

## 2022-10-05 ENCOUNTER — ROUTINE PRENATAL (OUTPATIENT)
Dept: OBSTETRICS AND GYNECOLOGY | Facility: CLINIC | Age: 27
End: 2022-10-05
Payer: MEDICAID

## 2022-10-05 VITALS
BODY MASS INDEX: 30.11 KG/M2 | SYSTOLIC BLOOD PRESSURE: 110 MMHG | WEIGHT: 175.38 LBS | DIASTOLIC BLOOD PRESSURE: 70 MMHG

## 2022-10-05 DIAGNOSIS — Z67.91 RH NEGATIVE STATUS DURING PREGNANCY IN THIRD TRIMESTER: ICD-10-CM

## 2022-10-05 DIAGNOSIS — F41.9 ANXIETY DURING PREGNANCY IN FIRST TRIMESTER, ANTEPARTUM: ICD-10-CM

## 2022-10-05 DIAGNOSIS — Z3A.26 26 WEEKS GESTATION OF PREGNANCY: ICD-10-CM

## 2022-10-05 DIAGNOSIS — O26.893 RH NEGATIVE STATUS DURING PREGNANCY IN THIRD TRIMESTER: ICD-10-CM

## 2022-10-05 DIAGNOSIS — O99.810 ABNORMAL O'SULLIVAN GLUCOSE CHALLENGE TEST, ANTEPARTUM: Primary | ICD-10-CM

## 2022-10-05 DIAGNOSIS — O99.341 ANXIETY DURING PREGNANCY IN FIRST TRIMESTER, ANTEPARTUM: ICD-10-CM

## 2022-10-05 DIAGNOSIS — O23.42 URINARY TRACT INFECTION AFFECTING CARE OF MOTHER IN SECOND TRIMESTER, ANTEPARTUM: Primary | ICD-10-CM

## 2022-10-05 LAB
BILIRUB SERPL-MCNC: NORMAL MG/DL
BLOOD URINE, POC: NEGATIVE
COLOR, POC UA: NORMAL
GLUCOSE UR QL STRIP: 250
KETONES UR QL STRIP: NORMAL
LEUKOCYTE ESTERASE URINE, POC: NORMAL
NITRITE, POC UA: NEGATIVE
PH, POC UA: 6
PROTEIN, POC: 30
SPECIFIC GRAVITY, POC UA: 1.02
UROBILINOGEN, POC UA: 4

## 2022-10-05 PROCEDURE — 59426 ANTEPARTUM CARE ONLY: CPT | Mod: S$PBB,TH,, | Performed by: STUDENT IN AN ORGANIZED HEALTH CARE EDUCATION/TRAINING PROGRAM

## 2022-10-05 PROCEDURE — 59426 PR ANTEPARTUM CARE ONLY, >7 VISITS: ICD-10-PCS | Mod: S$PBB,TH,, | Performed by: STUDENT IN AN ORGANIZED HEALTH CARE EDUCATION/TRAINING PROGRAM

## 2022-10-05 PROCEDURE — 81003 URINALYSIS AUTO W/O SCOPE: CPT | Mod: PBBFAC | Performed by: STUDENT IN AN ORGANIZED HEALTH CARE EDUCATION/TRAINING PROGRAM

## 2022-10-05 PROCEDURE — 99213 OFFICE O/P EST LOW 20 MIN: CPT | Mod: PBBFAC | Performed by: STUDENT IN AN ORGANIZED HEALTH CARE EDUCATION/TRAINING PROGRAM

## 2022-10-05 RX ORDER — FERROUS SULFATE 325(65) MG
325 TABLET, DELAYED RELEASE (ENTERIC COATED) ORAL DAILY
Qty: 30 TABLET | Refills: 6 | Status: ON HOLD | OUTPATIENT
Start: 2022-10-05 | End: 2023-02-28

## 2022-10-07 NOTE — PROGRESS NOTES
Subjective:      Rebekah Gregg is a 27 y.o. female being seen today for her obstetrical visit. She is at 28w4d gestation. Patient reports backache, no bleeding, no contractions, and no leaking. Fetal movement: normal.    Menstrual History:  OB History          7    Para   2    Term   2            AB   4    Living   2         SAB   4    IAB        Ectopic        Multiple        Live Births   2                  The following portions of the patient's history were reviewed and updated as appropriate: allergies, current medications, past family history, past medical history, past social history, past surgical history, and problem list.    Review of Systems  Pertinent items are noted in HPI.     Objective:      /70   Wt 79.6 kg (175 lb 6.4 oz)   LMP 2022   BMI 30.11 kg/m²   FHT:  150 BPM   Uterine Size: 30 cm   Presentation: cephalic        Assessment:      Pregnancy 28 and 4/7 weeks     Plan:      28-week labs pending  Rhogam ordered  Follow up in 2 Weeks.

## 2022-10-12 DIAGNOSIS — O26.899 RH NEGATIVE STATE IN ANTEPARTUM PERIOD: Primary | ICD-10-CM

## 2022-10-12 DIAGNOSIS — O99.810 ABNORMAL O'SULLIVAN GLUCOSE CHALLENGE TEST, ANTEPARTUM: ICD-10-CM

## 2022-10-12 DIAGNOSIS — Z67.91 RH NEGATIVE STATE IN ANTEPARTUM PERIOD: Primary | ICD-10-CM

## 2022-10-12 PROCEDURE — 36415 COLL VENOUS BLD VENIPUNCTURE: CPT | Performed by: STUDENT IN AN ORGANIZED HEALTH CARE EDUCATION/TRAINING PROGRAM

## 2022-10-13 ENCOUNTER — INFUSION (OUTPATIENT)
Dept: INFUSION THERAPY | Facility: HOSPITAL | Age: 27
End: 2022-10-13
Attending: STUDENT IN AN ORGANIZED HEALTH CARE EDUCATION/TRAINING PROGRAM
Payer: MEDICAID

## 2022-10-13 DIAGNOSIS — O26.899 RH NEGATIVE STATE IN ANTEPARTUM PERIOD: Primary | ICD-10-CM

## 2022-10-13 DIAGNOSIS — Z67.91 RH NEGATIVE STATE IN ANTEPARTUM PERIOD: Primary | ICD-10-CM

## 2022-10-13 DIAGNOSIS — O99.810 ABNORMAL O'SULLIVAN GLUCOSE CHALLENGE TEST, ANTEPARTUM: Primary | ICD-10-CM

## 2022-10-13 PROCEDURE — 63600519 RHOGAM PHARM REV CODE 636 ALT 250 W HCPCS: Performed by: STUDENT IN AN ORGANIZED HEALTH CARE EDUCATION/TRAINING PROGRAM

## 2022-10-13 PROCEDURE — 96372 THER/PROPH/DIAG INJ SC/IM: CPT

## 2022-10-13 RX ADMIN — HUMAN RHO(D) IMMUNE GLOBULIN 300 MCG: 1500 SOLUTION INTRAMUSCULAR; INTRAVENOUS at 10:10

## 2022-10-13 NOTE — PROGRESS NOTES
1025 pt here for rhogam injection, pt resting in recliner, TP released and blood bank notified.  Pt had type and screen drawn this am with other labs and rhogam is ready to be given  1038 rhogam given in the left deltoid and tolerated well.  Will continue to monitor for 15 min   1055 pt has no adverse reactions noted, discharged ambulatory

## 2022-10-18 ENCOUNTER — ROUTINE PRENATAL (OUTPATIENT)
Dept: OBSTETRICS AND GYNECOLOGY | Facility: CLINIC | Age: 27
End: 2022-10-18
Payer: MEDICAID

## 2022-10-18 VITALS — WEIGHT: 179 LBS | DIASTOLIC BLOOD PRESSURE: 72 MMHG | BODY MASS INDEX: 30.73 KG/M2 | SYSTOLIC BLOOD PRESSURE: 118 MMHG

## 2022-10-18 DIAGNOSIS — Z67.91 RH NEGATIVE STATUS DURING PREGNANCY IN FIRST TRIMESTER: Primary | ICD-10-CM

## 2022-10-18 DIAGNOSIS — O20.9 VAGINAL BLEEDING IN PREGNANCY, FIRST TRIMESTER: ICD-10-CM

## 2022-10-18 DIAGNOSIS — O99.611 CONSTIPATION DURING PREGNANCY IN FIRST TRIMESTER: ICD-10-CM

## 2022-10-18 DIAGNOSIS — Z3A.30 30 WEEKS GESTATION OF PREGNANCY: ICD-10-CM

## 2022-10-18 DIAGNOSIS — O47.02: ICD-10-CM

## 2022-10-18 DIAGNOSIS — O26.891 RH NEGATIVE STATUS DURING PREGNANCY IN FIRST TRIMESTER: Primary | ICD-10-CM

## 2022-10-18 DIAGNOSIS — O99.341 ANXIETY DURING PREGNANCY IN FIRST TRIMESTER, ANTEPARTUM: ICD-10-CM

## 2022-10-18 DIAGNOSIS — K59.00 CONSTIPATION DURING PREGNANCY IN FIRST TRIMESTER: ICD-10-CM

## 2022-10-18 DIAGNOSIS — O23.42 URINARY TRACT INFECTION AFFECTING CARE OF MOTHER IN SECOND TRIMESTER, ANTEPARTUM: ICD-10-CM

## 2022-10-18 DIAGNOSIS — Z3A.26 26 WEEKS GESTATION OF PREGNANCY: ICD-10-CM

## 2022-10-18 DIAGNOSIS — F41.9 ANXIETY DURING PREGNANCY IN FIRST TRIMESTER, ANTEPARTUM: ICD-10-CM

## 2022-10-18 LAB
BILIRUB SERPL-MCNC: NORMAL MG/DL
BLOOD URINE, POC: NORMAL
COLOR, POC UA: NORMAL
GLUCOSE UR QL STRIP: NORMAL
KETONES UR QL STRIP: NORMAL
LEUKOCYTE ESTERASE URINE, POC: NORMAL
NITRITE, POC UA: NORMAL
PH, POC UA: 5
PROTEIN, POC: NORMAL
SPECIFIC GRAVITY, POC UA: 1.01
UROBILINOGEN, POC UA: 0.2

## 2022-10-18 PROCEDURE — 99213 OFFICE O/P EST LOW 20 MIN: CPT | Mod: PBBFAC | Performed by: STUDENT IN AN ORGANIZED HEALTH CARE EDUCATION/TRAINING PROGRAM

## 2022-10-18 PROCEDURE — 59426 ANTEPARTUM CARE ONLY: CPT | Mod: S$PBB,TH,, | Performed by: STUDENT IN AN ORGANIZED HEALTH CARE EDUCATION/TRAINING PROGRAM

## 2022-10-18 PROCEDURE — 81003 URINALYSIS AUTO W/O SCOPE: CPT | Mod: PBBFAC | Performed by: STUDENT IN AN ORGANIZED HEALTH CARE EDUCATION/TRAINING PROGRAM

## 2022-10-18 PROCEDURE — 59426 PR ANTEPARTUM CARE ONLY, >7 VISITS: ICD-10-PCS | Mod: S$PBB,TH,, | Performed by: STUDENT IN AN ORGANIZED HEALTH CARE EDUCATION/TRAINING PROGRAM

## 2022-10-19 NOTE — PROGRESS NOTES
Subjective:      Rebekah Gregg is a 27 y.o. female being seen today for her obstetrical visit. She is at 30w3d gestation. Patient reports no bleeding, no cramping, no leaking, and occasional contractions. Fetal movement: normal.    Menstrual History:  OB History          7    Para   2    Term   2            AB   4    Living   2         SAB   4    IAB        Ectopic        Multiple        Live Births   2                  The following portions of the patient's history were reviewed and updated as appropriate: allergies, current medications, past family history, past medical history, past social history, past surgical history, and problem list.    Review of Systems  Pertinent items are noted in HPI.     Objective:      /72   Wt 81.2 kg (179 lb)   LMP 2022   BMI 30.73 kg/m²   FHT:  138 BPM   Uterine Size: 33 cm   Presentation: cephalic        Assessment:      Pregnancy 30 and 3/7 weeks     Plan:      28-week labs reviewed, abnormal  Passed 3 hr gtt  Follow up in 2 Weeks.

## 2022-10-26 ENCOUNTER — PATIENT MESSAGE (OUTPATIENT)
Dept: OBSTETRICS AND GYNECOLOGY | Facility: CLINIC | Age: 27
End: 2022-10-26
Payer: MEDICAID

## 2022-10-28 RX ORDER — OSELTAMIVIR PHOSPHATE 75 MG/1
75 CAPSULE ORAL 2 TIMES DAILY
Qty: 10 CAPSULE | Refills: 0 | Status: SHIPPED | OUTPATIENT
Start: 2022-10-28 | End: 2022-11-02

## 2022-11-03 ENCOUNTER — HOSPITAL ENCOUNTER (OUTPATIENT)
Facility: HOSPITAL | Age: 27
Discharge: HOME | End: 2022-11-03
Attending: OBSTETRICS & GYNECOLOGY | Admitting: OBSTETRICS & GYNECOLOGY
Payer: MEDICAID

## 2022-11-03 VITALS
WEIGHT: 182 LBS | SYSTOLIC BLOOD PRESSURE: 122 MMHG | DIASTOLIC BLOOD PRESSURE: 69 MMHG | HEART RATE: 108 BPM | RESPIRATION RATE: 20 BRPM | BODY MASS INDEX: 31.07 KG/M2 | HEIGHT: 64 IN | TEMPERATURE: 98 F

## 2022-11-03 DIAGNOSIS — Z34.90 PREGNANCY: ICD-10-CM

## 2022-11-03 PROBLEM — Z3A.33 33 WEEKS GESTATION OF PREGNANCY: Status: ACTIVE | Noted: 2022-05-06

## 2022-11-03 PROBLEM — O23.43 INFECTION OF URINARY TRACT IN PREGNANCY IN THIRD TRIMESTER: Status: ACTIVE | Noted: 2022-08-18

## 2022-11-03 PROBLEM — O26.893 PELVIC PAIN IN ANTEPARTUM PERIOD, THIRD TRIMESTER: Status: ACTIVE | Noted: 2022-07-08

## 2022-11-03 LAB
BACTERIA #/AREA URNS HPF: ABNORMAL /HPF
BILIRUB UR QL STRIP: NEGATIVE
CLARITY UR: ABNORMAL
COLOR UR: YELLOW
GLUCOSE UR STRIP-MCNC: 30 MG/DL
KETONES UR STRIP-SCNC: NEGATIVE MG/DL
LEUKOCYTE ESTERASE UR QL STRIP: ABNORMAL
MUCOUS THREADS #/AREA URNS HPF: ABNORMAL /HPF
NITRITE UR QL STRIP: NEGATIVE
PH UR STRIP: 6.5 PH UNITS
PROT UR QL STRIP: 20
RBC # UR STRIP: NEGATIVE /UL
RBC #/AREA URNS HPF: ABNORMAL /HPF
SP GR UR STRIP: 1.02
SQUAMOUS #/AREA URNS LPF: ABNORMAL /LPF
TRICHOMONAS #/AREA URNS HPF: ABNORMAL /HPF
UROBILINOGEN UR STRIP-ACNC: NORMAL MG/DL
WBC #/AREA URNS HPF: ABNORMAL /HPF
YEAST #/AREA URNS HPF: ABNORMAL /HPF

## 2022-11-03 PROCEDURE — 99211 OFF/OP EST MAY X REQ PHY/QHP: CPT

## 2022-11-03 PROCEDURE — 81003 URINALYSIS AUTO W/O SCOPE: CPT | Performed by: OBSTETRICS & GYNECOLOGY

## 2022-11-03 PROCEDURE — 63600175 PHARM REV CODE 636 W HCPCS: Performed by: OBSTETRICS & GYNECOLOGY

## 2022-11-03 PROCEDURE — 96372 THER/PROPH/DIAG INJ SC/IM: CPT

## 2022-11-03 PROCEDURE — 25000003 PHARM REV CODE 250: Performed by: OBSTETRICS & GYNECOLOGY

## 2022-11-03 PROCEDURE — 87086 URINE CULTURE/COLONY COUNT: CPT | Performed by: OBSTETRICS & GYNECOLOGY

## 2022-11-03 PROCEDURE — 81001 URINALYSIS AUTO W/SCOPE: CPT | Performed by: OBSTETRICS & GYNECOLOGY

## 2022-11-03 RX ADMIN — CEFTRIAXONE SODIUM 1 G: 1 INJECTION, POWDER, FOR SOLUTION INTRAMUSCULAR; INTRAVENOUS at 03:11

## 2022-11-03 NOTE — H&P
[facility] -  Labor and Delivery  History & Physical  Obstetrics   Labor and Delivery Triage    SUBJECTIVE:     Patient Info:  Rebekah Gregg         27 y.o.    female    1995     Chief Complaint/Reason for Admission:  Pelvic pressure and occasional uterine contractions    History of Present Illness:    Patient is a 27-year-old G7 P 204 2 who presents at 32 weeks and 6 days with complaints of pelvic pressure and occasional uterine contractions.  She states that over the past 2 days she was on her feet a lot secondary to a family death and her maternity support belt kept slipping out of place.  She denies any uterine contractions that are uncomfortable but does report urgency frequency and some dysuria.  She denies fever chills nausea and vomiting and was concerned that she may have another urinary tract infection.  Her cervical exam was unchanged at 160% and -3 station.      Other associated symptoms include low back pain, pelvic pressure, and urinary frequency. Fetal Movement: normal. Her current obstetrical history is significant for symptomatic pelvic relaxation.  She reports backache, no bleeding, no contractions, and no leaking.    OB History:   OB History          7    Para   2    Term   2            AB   4    Living   2         SAB   4    IAB        Ectopic        Multiple        Live Births   2                   Estimated Date of Delivery: 22     Gestational Age:  32w6d    Past Medical History:  Past Medical History:   Diagnosis Date    Mental disorder         Past Surgical History:  Past Surgical History:   Procedure Laterality Date    DILATION AND CURETTAGE OF UTERUS N/A 2022    Procedure: DILATION AND CURETTAGE, UTERUS;  Surgeon: Abdiel Ding DO;  Location: [facility] OR;  Service: OB/GYN;  Laterality: N/A;    HYSTEROSCOPY N/A 2022    Procedure: HYSTEROSCOPY, DIAGNOSTIC LAPAROSCOPY, CHROMOTUBATION;  Surgeon: Abdiel Ding DO;   Location: [facility] OR;  Service: OB/GYN;  Laterality: N/A;    LAPAROSCOPY N/A 2/22/2022    Procedure: LAPAROSCOPY;  Surgeon: Abdiel Ding DO;  Location: [facility] OR;  Service: OB/GYN;  Laterality: N/A;    miscarriage      x 4     TONSILLECTOMY      TYMPANOPLASTY      x4        Social History:   Alcohol/Tobacco:  Social History     Tobacco Use    Smoking status: Every Day     Types: Vaping with nicotine    Smokeless tobacco: Never   Substance Use Topics    Alcohol use: Never      Drug Use:  Social History     Substance and Sexual Activity   Drug Use Never       Family History:  Family History   Problem Relation Age of Onset    Stroke Mother        Allergies:  Review of patient's allergies indicates:   Allergen Reactions    Macrobid [nitrofurantoin monohyd/m-cryst] Hives    Penicillins Hives, Itching, Rash and Swelling       Meds Prior to Arrival:  Prior to Admission medications    Medication Sig Start Date End Date Taking? Authorizing Provider   famotidine (PEPCID) 20 MG tablet Take 1 tablet (20 mg total) by mouth 2 (two) times daily. 9/13/22 9/13/23  Abdiel Ding DO   ferrous sulfate 325 (65 FE) MG EC tablet Take 1 tablet (325 mg total) by mouth once daily. 10/5/22   Abdiel Ding DO   neomycin-polymyxin-hydrocortisone (CORTISPORIN) 3.5-10,000-1 mg/mL-unit/mL-% otic suspension Place 3 drops into the right ear 3 (three) times daily. 9/14/22   Mario Parry MD   ondansetron (ZOFRAN) 4 MG tablet Take 1 tablet (4 mg total) by mouth every 6 (six) hours as needed for Nausea. 9/13/22 9/13/23  Abdiel Ding DO   oseltamivir (TAMIFLU) 75 MG capsule Take 1 capsule (75 mg total) by mouth 2 (two) times daily. for 5 days 10/28/22 11/2/22  Abdiel Ding DO   sertraline (ZOLOFT) 100 MG tablet Take 1 tablet (100 mg total) by mouth once daily. 9/7/22 9/7/23  Abdile Ding DO        Review of Systems:  Review of systems is negative with the exception of  "HPI    OBJECTIVE:     Recent Vitals:  /69   Pulse 108   Temp 97.7 °F (36.5 °C)   Resp 20   Ht 5' 4" (1.626 m)   Wt 82.6 kg (182 lb)   LMP 03/19/2022     Exam:    dilated to 1 cm/60/-3      General: well developed, well nourished  Lungs:  clear to auscultation bilaterally and normal respiratory effort  Heart: regular rate and rhythm, S1, S2 normal, no murmur, click, rub or gallop  Abdomen: soft, non-tender non-distented; bowel sounds normal; no masses,  no organomegaly    Lab Results:  Recent Results (from the past 36 hour(s))   Urinalysis, Reflex to Urine Culture    Collection Time: 11/03/22  1:54 PM    Specimen: Urine   Result Value Ref Range    Color, UA Yellow Colorless, Straw, Yellow, Light Yellow, Dark Yellow    Clarity, UA Slightly Cloudy Clear    pH, UA 6.5 5.0 to 8.0 pH Units    Leukocytes, UA Large (A) Negative    Nitrites, UA Negative Negative    Protein, UA 20 (A) Negative    Glucose, UA 30 (A) Normal mg/dL    Ketones, UA Negative Negative mg/dL    Urobilinogen, UA Normal 0.2, 1.0, Normal mg/dL    Bilirubin, UA Negative Negative    Blood, UA Negative Negative    Specific Gravity, UA 1.019 <=1.030   Urinalysis, Microscopic    Collection Time: 11/03/22  1:54 PM   Result Value Ref Range    WBC, UA 0-5 None Seen, 0-5 /hpf    RBC, UA None Seen None Seen, 0-3 /hpf    Bacteria, UA Many (A) None Seen /hpf    Yeast, UA None Seen None Seen /hpf    Squamous Epithelial Cells, UA Moderate (A) None Seen, Rare, None Seen To Occasional /lpf    Mucus, UA None Seen None Seen /hpf    Trichomonas, UA None Seen None Seen /hpf     Lab Results   Component Value Date    GROUPTRH A NEG 10/13/2022          Diagnostic Studies:    Baseline: 130 bpm, Variability: Good {> 6 bpm), and Accelerations: Reactive          ASSESSMENT/PLAN:     Dx:Pregnancy [Z34.90]  Active Hospital Problems    Diagnosis  POA    *Pelvic pain in antepartum period, third trimester [O26.893, R10.2]  Yes     .  Patient again given instructions on the " proper use of maternity support belt.      Urinary tract infection affecting care of mother, antepartum, third trimester [O23.43]  Yes     Patient was given 1 g of Rocephin and discharged with a prescription for Septra DS  #20, 1 p.o. b.i.d..      33 weeks gestation of pregnancy [Z3A.33]  Not Applicable     Estimated Date of Delivery: 12/24/22 established by LMP, 6w US consistent  First Trimester  - Prenatal Battery: WNL  - UDS: neg  - Pap Smear: ASCUS, other HR HPV  - GC/C: neg  - NIPT ordered    Second Trimester  - Quad: Plan to offer at 15-19wks.  - Anatomy Ultrasound: Will schedule for 20wks    Third Trimester  - 28 wk labs:   - Rh Status: A neg  - 1Hr GCT:   - GC/C:   - GBS:   - EFW/ASTRID:    Vaccines  - Influenza:  - TDAP: Plan to give around 32wks  - COVID:     Contraception:        Resolved Hospital Problems   No resolved problems to display.         Admit to MD: Desmond Lisa MD    Admit to: discharged    observation    Code Status: Prior       Diagnostic Plan/Orders:  Orders Placed This Encounter   Procedures    Urine culture    Urinalysis, Reflex to Urine Culture    Urinalysis, Microscopic    Place in Outpatient    DISCHARGE PATIENT        Scheduled Meds/IV Therapy:             PRN Meds:

## 2022-11-05 LAB — UA COMPLETE W REFLEX CULTURE PNL UR: NORMAL

## 2022-11-09 ENCOUNTER — ROUTINE PRENATAL (OUTPATIENT)
Dept: OBSTETRICS AND GYNECOLOGY | Facility: CLINIC | Age: 27
End: 2022-11-09
Payer: MEDICAID

## 2022-11-09 VITALS — DIASTOLIC BLOOD PRESSURE: 76 MMHG | WEIGHT: 174 LBS | BODY MASS INDEX: 29.87 KG/M2 | SYSTOLIC BLOOD PRESSURE: 122 MMHG

## 2022-11-09 DIAGNOSIS — Z67.91 RH NEGATIVE STATUS DURING PREGNANCY IN THIRD TRIMESTER: ICD-10-CM

## 2022-11-09 DIAGNOSIS — Z3A.33 33 WEEKS GESTATION OF PREGNANCY: ICD-10-CM

## 2022-11-09 DIAGNOSIS — O99.343 ANXIETY DURING PREGNANCY, ANTEPARTUM, THIRD TRIMESTER: Primary | ICD-10-CM

## 2022-11-09 DIAGNOSIS — F41.9 ANXIETY DURING PREGNANCY, ANTEPARTUM, THIRD TRIMESTER: Primary | ICD-10-CM

## 2022-11-09 DIAGNOSIS — O26.893 RH NEGATIVE STATUS DURING PREGNANCY IN THIRD TRIMESTER: ICD-10-CM

## 2022-11-09 PROCEDURE — 90686 IIV4 VACC NO PRSV 0.5 ML IM: CPT | Mod: PBBFAC | Performed by: STUDENT IN AN ORGANIZED HEALTH CARE EDUCATION/TRAINING PROGRAM

## 2022-11-09 PROCEDURE — 90472 IMMUNIZATION ADMIN EACH ADD: CPT | Mod: PBBFAC | Performed by: STUDENT IN AN ORGANIZED HEALTH CARE EDUCATION/TRAINING PROGRAM

## 2022-11-09 PROCEDURE — 59426 PR ANTEPARTUM CARE ONLY, >7 VISITS: ICD-10-PCS | Mod: S$PBB,TH,, | Performed by: STUDENT IN AN ORGANIZED HEALTH CARE EDUCATION/TRAINING PROGRAM

## 2022-11-09 PROCEDURE — 59426 ANTEPARTUM CARE ONLY: CPT | Mod: S$PBB,TH,, | Performed by: STUDENT IN AN ORGANIZED HEALTH CARE EDUCATION/TRAINING PROGRAM

## 2022-11-09 PROCEDURE — 99213 OFFICE O/P EST LOW 20 MIN: CPT | Mod: PBBFAC | Performed by: STUDENT IN AN ORGANIZED HEALTH CARE EDUCATION/TRAINING PROGRAM

## 2022-11-09 PROCEDURE — 81003 URINALYSIS AUTO W/O SCOPE: CPT | Mod: PBBFAC | Performed by: STUDENT IN AN ORGANIZED HEALTH CARE EDUCATION/TRAINING PROGRAM

## 2022-11-11 NOTE — PROGRESS NOTES
Subjective:      Rebekah Gregg is a 27 y.o. female being seen today for her obstetrical visit. She is at 33w4d gestation. Patient reports heartburn, nausea, no bleeding, no contractions, no cramping, no leaking, and vomiting. Fetal movement: normal.    Menstrual History:  OB History          7    Para   2    Term   2            AB   4    Living   2         SAB   4    IAB        Ectopic        Multiple        Live Births   2                  The following portions of the patient's history were reviewed and updated as appropriate: allergies, current medications, past family history, past medical history, past social history, past surgical history, and problem list.    Review of Systems  Pertinent items are noted in HPI.     Objective:      /76   Wt 78.9 kg (174 lb)   LMP 2022   BMI 29.87 kg/m²   FHT:  138 BPM   Uterine Size: 34 cm   Presentation: cephalic        Assessment:      Pregnancy 33 and 4/7 weeks     Plan:   Flu and tdap given   28-week labs reviewed, abnormal  Follow up in 2 Weeks.

## 2022-11-14 ENCOUNTER — HOSPITAL ENCOUNTER (OUTPATIENT)
Facility: HOSPITAL | Age: 27
Discharge: HOME | End: 2022-11-14
Attending: OBSTETRICS & GYNECOLOGY | Admitting: OBSTETRICS & GYNECOLOGY
Payer: MEDICAID

## 2022-11-14 VITALS
TEMPERATURE: 98 F | OXYGEN SATURATION: 99 % | DIASTOLIC BLOOD PRESSURE: 62 MMHG | SYSTOLIC BLOOD PRESSURE: 114 MMHG | RESPIRATION RATE: 20 BRPM | HEART RATE: 104 BPM | WEIGHT: 179 LBS | BODY MASS INDEX: 30.56 KG/M2 | HEIGHT: 64 IN

## 2022-11-14 DIAGNOSIS — Z34.90 PREGNANCY: ICD-10-CM

## 2022-11-14 PROBLEM — Z3A.34 34 WEEKS GESTATION OF PREGNANCY: Status: ACTIVE | Noted: 2022-05-06

## 2022-11-14 LAB
BACTERIA #/AREA URNS HPF: ABNORMAL /HPF
BILIRUB UR QL STRIP: NEGATIVE
CAOX CRY #/AREA URNS LPF: ABNORMAL /LPF
CLARITY UR: CLEAR
COLOR UR: YELLOW
GLUCOSE UR STRIP-MCNC: 30 MG/DL
KETONES UR STRIP-SCNC: ABNORMAL MG/DL
LEUKOCYTE ESTERASE UR QL STRIP: ABNORMAL
MUCOUS THREADS #/AREA URNS HPF: ABNORMAL /HPF
NITRITE UR QL STRIP: NEGATIVE
PH UR STRIP: 6.5 PH UNITS
PROT UR QL STRIP: 30
RBC # UR STRIP: NEGATIVE /UL
RBC #/AREA URNS HPF: ABNORMAL /HPF
SP GR UR STRIP: 1.03
SQUAMOUS #/AREA URNS LPF: ABNORMAL /LPF
UROBILINOGEN UR STRIP-ACNC: 2 MG/DL
WBC #/AREA URNS HPF: ABNORMAL /HPF

## 2022-11-14 PROCEDURE — 81003 URINALYSIS AUTO W/O SCOPE: CPT | Performed by: OBSTETRICS & GYNECOLOGY

## 2022-11-14 PROCEDURE — 99211 OFF/OP EST MAY X REQ PHY/QHP: CPT | Mod: 25

## 2022-11-14 PROCEDURE — 59025 FETAL NON-STRESS TEST: CPT

## 2022-11-14 PROCEDURE — 81001 URINALYSIS AUTO W/SCOPE: CPT | Performed by: OBSTETRICS & GYNECOLOGY

## 2022-11-14 NOTE — DISCHARGE INSTRUCTIONS
Keep all f/u appointments as previously scheduled. Return to the hospital if you have more than 4-6 contractions consistently in an hour unrelieved by rest and hydration with a large glass of water; constant abdominal pain that will not subside; decrease fetal movement; bright red vaginal bleeding; or any other concerning problems. If you have any questions or concerns, call Labor and Delivery at [phone]. Thank you.

## 2022-11-14 NOTE — H&P
[facility] -  Labor and Delivery  History & Physical  Obstetrics   Labor and Delivery Triage    SUBJECTIVE:     Patient Info:  Rebekah Gregg         27 y.o.    female    1995     Chief Complaint/Reason for Admission: Pelvic pain    History of Present Illness:  Patient is a 27-year-old  7 para 2 A/B for who presents at 34 weeks and 2 days with complaints of pelvic pressure and pain similar to her previous complaints.  She denies uterine contractions leakage of amniotic fluid vaginal bleeding changes in bowel habits fever chills nausea or vomiting.  She does state that she recently completed a round of antibiotics and was concerned that she may have another urinary tract infection.  She denies dysuria frequency urgency and  incomplete emptying other than what is normal for her.  She has no other complaints and is scheduled for follow-up with Dr. Ding in 1 week.  Urinalysis was performed which showed negative nitrites and no hematuria.  She did have a trace of ketones.  Urine culture obtained on 2022 showed no growth.  A reactive NST was obtained with no evidence of regular uterine activity.        OB History:   OB History          7    Para   2    Term   2            AB   4    Living   2         SAB   4    IAB        Ectopic        Multiple        Live Births   2                   Estimated Date of Delivery: 22     Gestational Age:  34w2d    Past Medical History:  Past Medical History:   Diagnosis Date    Mental disorder         Past Surgical History:  Past Surgical History:   Procedure Laterality Date    DILATION AND CURETTAGE OF UTERUS N/A 2022    Procedure: DILATION AND CURETTAGE, UTERUS;  Surgeon: Abdiel Ding DO;  Location: [facility] OR;  Service: OB/GYN;  Laterality: N/A;    HYSTEROSCOPY N/A 2022    Procedure: HYSTEROSCOPY, DIAGNOSTIC LAPAROSCOPY, CHROMOTUBATION;  Surgeon: Abdiel Ding DO;  Location: [facility] OR;   "Service: OB/GYN;  Laterality: N/A;    LAPAROSCOPY N/A 2/22/2022    Procedure: LAPAROSCOPY;  Surgeon: Abdiel Ding DO;  Location: [facility];  Service: OB/GYN;  Laterality: N/A;    miscarriage      x 4     TONSILLECTOMY      TYMPANOPLASTY      x4        Social History:   Alcohol/Tobacco:  Social History     Tobacco Use    Smoking status: Every Day     Types: Vaping with nicotine    Smokeless tobacco: Never   Substance Use Topics    Alcohol use: Never      Drug Use:  Social History     Substance and Sexual Activity   Drug Use Never       Family History:  Family History   Problem Relation Age of Onset    Stroke Mother        Allergies:  Review of patient's allergies indicates:   Allergen Reactions    Macrobid [nitrofurantoin monohyd/m-cryst] Hives    Penicillins Hives, Itching, Rash and Swelling       Meds Prior to Arrival:  Prior to Admission medications    Medication Sig Start Date End Date Taking? Authorizing Provider   famotidine (PEPCID) 20 MG tablet Take 1 tablet (20 mg total) by mouth 2 (two) times daily. 9/13/22 9/13/23  Abdiel Ding DO   ferrous sulfate 325 (65 FE) MG EC tablet Take 1 tablet (325 mg total) by mouth once daily. 10/5/22   Abdiel Ding DO   neomycin-polymyxin-hydrocortisone (CORTISPORIN) 3.5-10,000-1 mg/mL-unit/mL-% otic suspension Place 3 drops into the right ear 3 (three) times daily. 9/14/22   Mario Parry MD   ondansetron (ZOFRAN) 4 MG tablet Take 1 tablet (4 mg total) by mouth every 6 (six) hours as needed for Nausea. 9/13/22 9/13/23  Abdiel Ding DO   sertraline (ZOLOFT) 100 MG tablet Take 1 tablet (100 mg total) by mouth once daily. 9/7/22 9/7/23  Abdiel Ding DO        Review of Systems:  All negative except HPI      OBJECTIVE:     Recent Vitals:  /62   Pulse 104   Temp 97.7 °F (36.5 °C) (Skin)   Resp 20   Ht 5' 4" (1.626 m)   Wt 81.2 kg (179 lb)   LMP 03/19/2022     Exam:    Cervical exam not performed as she " reported no contractions.    General: well developed, well nourished  Lungs:  clear to auscultation bilaterally and normal respiratory effort  Heart: regular rate and rhythm, S1, S2 normal, no murmur, click, rub or gallop  Abdomen: soft, non-tender non-distented; bowel sounds normal; no masses,  no organomegaly  Extremities: no cyanosis or edema, or clubbing    Lab Results:  Recent Results (from the past 36 hour(s))   Urinalysis, Reflex to Urine Culture    Collection Time: 11/14/22 11:05 AM    Specimen: Urine   Result Value Ref Range    Color, UA Yellow Colorless, Straw, Yellow, Light Yellow, Dark Yellow    Clarity, UA Clear Clear    pH, UA 6.5 5.0 to 8.0 pH Units    Leukocytes, UA Moderate (A) Negative    Nitrites, UA Negative Negative    Protein, UA 30 (A) Negative    Glucose, UA 30 (A) Normal mg/dL    Ketones, UA Trace Negative mg/dL    Urobilinogen, UA 2 (A) 0.2, 1.0, Normal mg/dL    Bilirubin, UA Negative Negative    Blood, UA Negative Negative    Specific Gravity, UA 1.026 <=1.030   Urinalysis, Microscopic    Collection Time: 11/14/22 11:05 AM   Result Value Ref Range    WBC, UA 0-5 None Seen, 0-5 /hpf    RBC, UA 0-3 None Seen, 0-3 /hpf    Bacteria, UA Few (A) None Seen /hpf    Squamous Epithelial Cells, UA Moderate (A) None Seen, Rare, None Seen To Occasional /lpf    Mucus, UA Moderate (A) None Seen /hpf    Calcium Oxalate Crystals, UA Few (A) None Seen /lpf     Lab Results   Component Value Date    GROUPTRH A NEG 10/13/2022          Diagnostic Studies:    Baseline: 140   bpm, Variability: Good {> 6 bpm), and Accelerations: Reactive          ASSESSMENT/PLAN:     Dx:Pregnancy [Z34.90]  Active Hospital Problems    Diagnosis  POA    *Pelvic pain in antepartum period, third trimester [O26.893, R10.2]  Yes     .  Patient again given instructions on the proper use of maternity support belt.      34 weeks gestation of pregnancy [Z3A.34]  Not Applicable     Estimated Date of Delivery: 12/24/22 established by LMP, 6w  US consistent  First Trimester  - Prenatal Battery: WNL  - UDS: neg  - Pap Smear: ASCUS, other HR HPV  - GC/C: neg  - NIPT ordered    Second Trimester  - Quad: Plan to offer at 15-19wks.  - Anatomy Ultrasound: Will schedule for 20wks    Third Trimester  - 28 wk labs:   - Rh Status: A neg  - 1Hr GCT:   - GC/C:   - GBS:   - EFW/ASTRID:    Vaccines  - Influenza:  - TDAP: Plan to give around 32wks  - COVID:     Contraception:        Resolved Hospital Problems   No resolved problems to display.     Follow-up with Dr. Ding in 1 week.    Admit to MD: Desmond Lisa MD    Admit to: discharged    observation    Code Status: Prior       Diagnostic Plan/Orders:  Orders Placed This Encounter   Procedures    Urinalysis, Reflex to Urine Culture    Urinalysis, Microscopic    Fetal non-stress test    Place in Outpatient    DISCHARGE PATIENT        Scheduled Meds/IV Therapy:             PRN Meds:

## 2022-11-21 ENCOUNTER — TELEPHONE (OUTPATIENT)
Dept: OBSTETRICS AND GYNECOLOGY | Facility: CLINIC | Age: 27
End: 2022-11-21
Payer: MEDICAID

## 2022-11-21 ENCOUNTER — PATIENT MESSAGE (OUTPATIENT)
Dept: OBSTETRICS AND GYNECOLOGY | Facility: CLINIC | Age: 27
End: 2022-11-21
Payer: MEDICAID

## 2022-11-21 ENCOUNTER — HOSPITAL ENCOUNTER (OUTPATIENT)
Facility: HOSPITAL | Age: 27
Discharge: HOME | End: 2022-11-21
Attending: OBSTETRICS & GYNECOLOGY | Admitting: OBSTETRICS & GYNECOLOGY
Payer: MEDICAID

## 2022-11-21 VITALS
DIASTOLIC BLOOD PRESSURE: 60 MMHG | SYSTOLIC BLOOD PRESSURE: 118 MMHG | HEART RATE: 109 BPM | BODY MASS INDEX: 31.1 KG/M2 | RESPIRATION RATE: 22 BRPM | OXYGEN SATURATION: 99 % | HEIGHT: 64 IN | WEIGHT: 182.19 LBS | TEMPERATURE: 99 F

## 2022-11-21 DIAGNOSIS — O21.9 NAUSEA AND VOMITING DURING PREGNANCY: Primary | ICD-10-CM

## 2022-11-21 DIAGNOSIS — Z34.90 PREGNANCY: ICD-10-CM

## 2022-11-21 PROBLEM — J10.1 INFLUENZA A: Status: ACTIVE | Noted: 2022-11-21

## 2022-11-21 PROBLEM — Z3A.35 35 WEEKS GESTATION OF PREGNANCY: Status: ACTIVE | Noted: 2022-11-21

## 2022-11-21 LAB
ALBUMIN SERPL BCP-MCNC: 2.5 G/DL (ref 3.5–5)
ALBUMIN/GLOB SERPL: 0.6 {RATIO}
ALP SERPL-CCNC: 194 U/L (ref 37–98)
ALT SERPL W P-5'-P-CCNC: 90 U/L (ref 13–56)
AMYLASE SERPL-CCNC: 75 U/L (ref 25–115)
ANION GAP SERPL CALCULATED.3IONS-SCNC: 13 MMOL/L (ref 7–16)
AST SERPL W P-5'-P-CCNC: 78 U/L (ref 15–37)
BACTERIA #/AREA URNS HPF: ABNORMAL /HPF
BASOPHILS # BLD AUTO: 0.02 K/UL (ref 0–0.2)
BASOPHILS NFR BLD AUTO: 0.3 % (ref 0–1)
BILIRUB SERPL-MCNC: 1 MG/DL (ref ?–1.2)
BILIRUB UR QL STRIP: NEGATIVE
BUN SERPL-MCNC: 4 MG/DL (ref 7–18)
BUN/CREAT SERPL: 5 (ref 6–20)
CALCIUM SERPL-MCNC: 8 MG/DL (ref 8.5–10.1)
CHLORIDE SERPL-SCNC: 101 MMOL/L (ref 98–107)
CLARITY UR: CLEAR
CO2 SERPL-SCNC: 21 MMOL/L (ref 21–32)
COLOR UR: YELLOW
CREAT SERPL-MCNC: 0.83 MG/DL (ref 0.55–1.02)
DIFFERENTIAL METHOD BLD: ABNORMAL
EGFR (NO RACE VARIABLE) (RUSH/TITUS): 99 ML/MIN/1.73M²
EOSINOPHIL # BLD AUTO: 0.01 K/UL (ref 0–0.5)
EOSINOPHIL NFR BLD AUTO: 0.1 % (ref 1–4)
ERYTHROCYTE [DISTWIDTH] IN BLOOD BY AUTOMATED COUNT: 14.1 % (ref 11.5–14.5)
FLUAV AG UPPER RESP QL IA.RAPID: POSITIVE
FLUBV AG UPPER RESP QL IA.RAPID: NEGATIVE
GLOBULIN SER-MCNC: 3.9 G/DL (ref 2–4)
GLUCOSE SERPL-MCNC: 89 MG/DL (ref 74–106)
GLUCOSE UR STRIP-MCNC: NORMAL MG/DL
HAV IGM SER QL: NORMAL
HBV CORE IGM SER QL: NORMAL
HBV SURFACE AG SERPL QL IA: NORMAL
HCT VFR BLD AUTO: 28.2 % (ref 38–47)
HCV AB SER QL: NORMAL
HGB BLD-MCNC: 9.3 G/DL (ref 12–16)
HYPOCHROMIA BLD QL SMEAR: ABNORMAL
IMM GRANULOCYTES # BLD AUTO: 0.06 K/UL (ref 0–0.04)
IMM GRANULOCYTES NFR BLD: 0.8 % (ref 0–0.4)
KETONES UR STRIP-SCNC: ABNORMAL MG/DL
LEUKOCYTE ESTERASE UR QL STRIP: ABNORMAL
LIPASE SERPL-CCNC: 137 U/L (ref 73–393)
LYMPHOCYTES # BLD AUTO: 0.65 K/UL (ref 1–4.8)
LYMPHOCYTES NFR BLD AUTO: 8.5 % (ref 27–41)
LYMPHOCYTES NFR BLD MANUAL: 7 % (ref 27–41)
MCH RBC QN AUTO: 29.5 PG (ref 27–31)
MCHC RBC AUTO-ENTMCNC: 33 G/DL (ref 32–36)
MCV RBC AUTO: 89.5 FL (ref 80–96)
MONOCYTES # BLD AUTO: 0.88 K/UL (ref 0–0.8)
MONOCYTES NFR BLD AUTO: 11.4 % (ref 2–6)
MONOCYTES NFR BLD MANUAL: 11 % (ref 2–6)
MPC BLD CALC-MCNC: 10.3 FL (ref 9.4–12.4)
MUCOUS THREADS #/AREA URNS HPF: ABNORMAL /HPF
NEUTROPHILS # BLD AUTO: 6.07 K/UL (ref 1.8–7.7)
NEUTROPHILS NFR BLD AUTO: 78.9 % (ref 53–65)
NEUTS BAND NFR BLD MANUAL: 5 % (ref 1–5)
NEUTS SEG NFR BLD MANUAL: 77 % (ref 50–62)
NITRITE UR QL STRIP: NEGATIVE
NRBC # BLD AUTO: 0 X10E3/UL
NRBC, AUTO (.00): 0 %
PH UR STRIP: 6 PH UNITS
PLATELET # BLD AUTO: 193 K/UL (ref 150–400)
PLATELET MORPHOLOGY: NORMAL
POTASSIUM SERPL-SCNC: 3.3 MMOL/L (ref 3.5–5.1)
PROT SERPL-MCNC: 6.4 G/DL (ref 6.4–8.2)
PROT UR QL STRIP: 20
RBC # BLD AUTO: 3.15 M/UL (ref 4.2–5.4)
RBC # UR STRIP: NEGATIVE /UL
RBC #/AREA URNS HPF: ABNORMAL /HPF
SARS-COV+SARS-COV-2 AG RESP QL IA.RAPID: NEGATIVE
SODIUM SERPL-SCNC: 132 MMOL/L (ref 136–145)
SP GR UR STRIP: 1.01
SQUAMOUS #/AREA URNS LPF: ABNORMAL /LPF
TRICHOMONAS #/AREA URNS HPF: ABNORMAL /HPF
UROBILINOGEN UR STRIP-ACNC: NORMAL MG/DL
WBC # BLD AUTO: 7.69 K/UL (ref 4.5–11)
WBC #/AREA URNS HPF: ABNORMAL /HPF
YEAST #/AREA URNS HPF: ABNORMAL /HPF

## 2022-11-21 PROCEDURE — 96375 TX/PRO/DX INJ NEW DRUG ADDON: CPT

## 2022-11-21 PROCEDURE — 36415 COLL VENOUS BLD VENIPUNCTURE: CPT | Performed by: OBSTETRICS & GYNECOLOGY

## 2022-11-21 PROCEDURE — C9113 INJ PANTOPRAZOLE SODIUM, VIA: HCPCS | Performed by: OBSTETRICS & GYNECOLOGY

## 2022-11-21 PROCEDURE — 85025 COMPLETE CBC W/AUTO DIFF WBC: CPT | Performed by: OBSTETRICS & GYNECOLOGY

## 2022-11-21 PROCEDURE — 87428 SARSCOV & INF VIR A&B AG IA: CPT | Performed by: OBSTETRICS & GYNECOLOGY

## 2022-11-21 PROCEDURE — 81001 URINALYSIS AUTO W/SCOPE: CPT | Performed by: OBSTETRICS & GYNECOLOGY

## 2022-11-21 PROCEDURE — 76818 FETAL BIOPHYS PROFILE W/NST: CPT

## 2022-11-21 PROCEDURE — 96374 THER/PROPH/DIAG INJ IV PUSH: CPT

## 2022-11-21 PROCEDURE — 80053 COMPREHEN METABOLIC PANEL: CPT | Performed by: OBSTETRICS & GYNECOLOGY

## 2022-11-21 PROCEDURE — 63600175 PHARM REV CODE 636 W HCPCS: Performed by: OBSTETRICS & GYNECOLOGY

## 2022-11-21 PROCEDURE — 99211 OFF/OP EST MAY X REQ PHY/QHP: CPT | Mod: 25

## 2022-11-21 PROCEDURE — 96361 HYDRATE IV INFUSION ADD-ON: CPT

## 2022-11-21 PROCEDURE — 59025 FETAL NON-STRESS TEST: CPT | Mod: 59

## 2022-11-21 PROCEDURE — 83690 ASSAY OF LIPASE: CPT | Performed by: OBSTETRICS & GYNECOLOGY

## 2022-11-21 PROCEDURE — 82150 ASSAY OF AMYLASE: CPT | Performed by: OBSTETRICS & GYNECOLOGY

## 2022-11-21 PROCEDURE — 80074 ACUTE HEPATITIS PANEL: CPT | Performed by: OBSTETRICS & GYNECOLOGY

## 2022-11-21 RX ORDER — PANTOPRAZOLE SODIUM 40 MG/10ML
40 INJECTION, POWDER, LYOPHILIZED, FOR SOLUTION INTRAVENOUS ONCE
Status: COMPLETED | OUTPATIENT
Start: 2022-11-21 | End: 2022-11-21

## 2022-11-21 RX ORDER — SODIUM CHLORIDE, SODIUM LACTATE, POTASSIUM CHLORIDE, CALCIUM CHLORIDE 600; 310; 30; 20 MG/100ML; MG/100ML; MG/100ML; MG/100ML
INJECTION, SOLUTION INTRAVENOUS CONTINUOUS
Status: DISPENSED | OUTPATIENT
Start: 2022-11-21 | End: 2022-11-21

## 2022-11-21 RX ORDER — OSELTAMIVIR PHOSPHATE 75 MG/1
75 CAPSULE ORAL 2 TIMES DAILY
Qty: 10 CAPSULE | Refills: 0 | Status: SHIPPED | OUTPATIENT
Start: 2022-11-21 | End: 2022-11-26

## 2022-11-21 RX ORDER — ONDANSETRON 2 MG/ML
4 INJECTION INTRAMUSCULAR; INTRAVENOUS ONCE
Status: COMPLETED | OUTPATIENT
Start: 2022-11-21 | End: 2022-11-21

## 2022-11-21 RX ORDER — PROMETHAZINE HYDROCHLORIDE 25 MG/1
25 SUPPOSITORY RECTAL EVERY 6 HOURS PRN
Qty: 12 SUPPOSITORY | Refills: 0 | Status: ON HOLD | OUTPATIENT
Start: 2022-11-21 | End: 2023-02-28 | Stop reason: HOSPADM

## 2022-11-21 RX ORDER — OSELTAMIVIR PHOSPHATE 75 MG/1
75 CAPSULE ORAL 2 TIMES DAILY
Status: DISCONTINUED | OUTPATIENT
Start: 2022-11-21 | End: 2022-11-21 | Stop reason: HOSPADM

## 2022-11-21 RX ADMIN — SODIUM CHLORIDE, POTASSIUM CHLORIDE, SODIUM LACTATE AND CALCIUM CHLORIDE 1000 ML: 600; 310; 30; 20 INJECTION, SOLUTION INTRAVENOUS at 04:11

## 2022-11-21 RX ADMIN — SODIUM CHLORIDE, POTASSIUM CHLORIDE, SODIUM LACTATE AND CALCIUM CHLORIDE 1000 ML: 600; 310; 30; 20 INJECTION, SOLUTION INTRAVENOUS at 02:11

## 2022-11-21 RX ADMIN — ONDANSETRON HYDROCHLORIDE 4 MG: 2 SOLUTION INTRAMUSCULAR; INTRAVENOUS at 03:11

## 2022-11-21 RX ADMIN — PANTOPRAZOLE SODIUM 40 MG: 40 INJECTION, POWDER, FOR SOLUTION INTRAVENOUS at 03:11

## 2022-11-21 RX ADMIN — SODIUM CHLORIDE, POTASSIUM CHLORIDE, SODIUM LACTATE AND CALCIUM CHLORIDE: 600; 310; 30; 20 INJECTION, SOLUTION INTRAVENOUS at 05:11

## 2022-11-21 NOTE — H&P
[facility] -  Labor [facility] Delivery  History & Physical  Obstetrics   Labor and Delivery Triage    SUBJECTIVE:     Patient Info:  Rebekah Gregg         27 y.o.    female    1995     Chief Complaint/Reason for Admission:  Nausea and vomiting that started late Friday (2022)    History of Present Illness:  Patient presents at 35 weeks and 2 days with complaints of persistent nausea and vomiting that began on 2022.  She denies fever chills diarrhea or constipation.  She denies any contact with any ill persons.  She also denies uterine contractions and reports active fetal movement.  Upon arrival the fetus was tachycardic but responded well to IV hydration despite negative ketonuria on the urine.  She denies vaginal bleeding or leakage of amniotic fluid.  Liver enzymes were elevated with AST of 90 in ill ALT of 78.  Her total bilirubin was normal at 1.0 and her amylase and lipase were within normal limits.  Her blood pressure was also normal as were her platelets and her white count.  She did test positive for influenza A and will be discharged home on Tamiflu 75 mg b.i.d. for 5 days and Phenergan suppositories 25 mg 1 per rectum q.6 8 hours p.r.n..    OB History:   OB History          7    Para   2    Term   2            AB   4    Living   2         SAB   4    IAB        Ectopic        Multiple        Live Births   2                   Estimated Date of Delivery: 22     Gestational Age:  35w2d    Past Medical History:  Past Medical History:   Diagnosis Date    Mental disorder         Past Surgical History:  Past Surgical History:   Procedure Laterality Date    DILATION AND CURETTAGE OF UTERUS N/A 2022    Procedure: DILATION AND CURETTAGE, UTERUS;  Surgeon: Abdiel Ding DO;  Location: [facility], [place];  Service: OB/GYN;  Laterality: N/A;    HYSTEROSCOPY N/A 2022    Procedure: HYSTEROSCOPY, DIAGNOSTIC LAPAROSCOPY, CHROMOTUBATION;  Surgeon: Abdiel  Juni Ding DO;  Location: [facility];  Service: OB/GYN;  Laterality: N/A;    LAPAROSCOPY N/A 2/22/2022    Procedure: LAPAROSCOPY;  Surgeon: Abdiel Ding DO;  Location: [facility];  Service: OB/GYN;  Laterality: N/A;    miscarriage      x 4     TONSILLECTOMY      TYMPANOPLASTY      x4        Social History:   Alcohol/Tobacco:  Social History     Tobacco Use    Smoking status: Every Day     Types: Vaping with nicotine    Smokeless tobacco: Never   Substance Use Topics    Alcohol use: Never      Drug Use:  Social History     Substance and Sexual Activity   Drug Use Never       Family History:  Family History   Problem Relation Age of Onset    Stroke Mother        Allergies:  Review of patient's allergies indicates:   Allergen Reactions    Macrobid [nitrofurantoin monohyd/m-cryst] Hives    Penicillins Hives, Itching, Rash and Swelling       Meds Prior to Arrival:  Prior to Admission medications    Medication Sig Start Date End Date Taking? Authorizing Provider   famotidine (PEPCID) 20 MG tablet Take 1 tablet (20 mg total) by mouth 2 (two) times daily. 9/13/22 9/13/23  Abdiel Ding DO   ferrous sulfate 325 (65 FE) MG EC tablet Take 1 tablet (325 mg total) by mouth once daily. 10/5/22   Abdiel Ding DO   oseltamivir (TAMIFLU) 75 MG capsule Take 1 capsule (75 mg total) by mouth 2 (two) times daily. for 5 days 11/21/22 11/26/22  Desmond Lisa MD   promethazine (PHENERGAN) 25 MG suppository Place 1 suppository (25 mg total) rectally every 6 (six) hours as needed for Nausea. 11/21/22   Desmond Lisa MD   sertraline (ZOLOFT) 100 MG tablet Take 1 tablet (100 mg total) by mouth once daily. 9/7/22 9/7/23  Abdiel Ding DO   neomycin-polymyxin-hydrocortisone (CORTISPORIN) 3.5-10,000-1 mg/mL-unit/mL-% otic suspension Place 3 drops into the right ear 3 (three) times daily. 9/14/22 11/21/22  Mario Parry MD   ondansetron (ZOFRAN) 4 MG tablet Take 1 tablet (4 mg total)  by mouth every 6 (six) hours as needed for Nausea. 9/13/22 11/21/22  Abdiel Ding DO        Review of Systems:  Constitutional: no fever or chills  ENT: no nasal congestion or sore throat  Respiratory: no cough or shortness of breath  Cardiovascular: no chest pain or palpitations  Gastrointestinal: positive for vomiting  Hematologic/Lymphatic: no easy bruising or lymphadenopathy  Musculoskeletal: no arthralgias or myalgias  Behavioral/Psych: no auditory or visual hallucinations    OBJECTIVE:     Recent Vitals:  /60   Pulse 109   LMP 03/19/2022     Cervical exam:not done        General: well developed, moderate distress  Lungs:  clear to auscultation bilaterally and normal respiratory effort  Heart: regular rate and rhythm, S1, S2 normal, no murmur, click, rub or gallop  Abdomen: normal findings: soft, sl. tender no rebound   Extremities: no cyanosis or edema, or clubbing    Lab Results:  Recent Results (from the past 36 hour(s))   Comprehensive Metabolic Panel    Collection Time: 11/21/22  2:58 PM   Result Value Ref Range    Sodium 132 (L) 136 - 145 mmol/L    Potassium 3.3 (L) 3.5 - 5.1 mmol/L    Chloride 101 98 - 107 mmol/L    CO2 21 21 - 32 mmol/L    Anion Gap 13 7 - 16 mmol/L    Glucose 89 74 - 106 mg/dL    BUN 4 (L) 7 - 18 mg/dL    Creatinine 0.83 0.55 - 1.02 mg/dL    BUN/Creatinine Ratio 5 (L) 6 - 20    Calcium 8.0 (L) 8.5 - 10.1 mg/dL    Total Protein 6.4 6.4 - 8.2 g/dL    Albumin 2.5 (L) 3.5 - 5.0 g/dL    Globulin 3.9 2.0 - 4.0 g/dL    A/G Ratio 0.6     Bilirubin, Total 1.0 >0.0 - 1.2 mg/dL    Alk Phos 194 (H) 37 - 98 U/L    ALT 90 (H) 13 - 56 U/L    AST 78 (H) 15 - 37 U/L    eGFR 99 >=60 mL/min/1.73m²   CBC with Differential    Collection Time: 11/21/22  2:58 PM   Result Value Ref Range    WBC 7.69 4.50 - 11.00 K/uL    RBC 3.15 (L) 4.20 - 5.40 M/uL    Hemoglobin 9.3 (L) 12.0 - 16.0 g/dL    Hematocrit 28.2 (L) 38.0 - 47.0 %    MCV 89.5 80.0 - 96.0 fL    MCH 29.5 27.0 - 31.0 pg    MCHC 33.0  32.0 - 36.0 g/dL    RDW 14.1 11.5 - 14.5 %    Platelet Count 193 150 - 400 K/uL    MPV 10.3 9.4 - 12.4 fL    Neutrophils % 78.9 (H) 53.0 - 65.0 %    Lymphocytes % 8.5 (L) 27.0 - 41.0 %    Monocytes % 11.4 (H) 2.0 - 6.0 %    Eosinophils % 0.1 (L) 1.0 - 4.0 %    Basophils % 0.3 0.0 - 1.0 %    Immature Granulocytes % 0.8 (H) 0.0 - 0.4 %    nRBC, Auto 0.0 <=0.0 %    Neutrophils, Abs 6.07 1.80 - 7.70 K/uL    Lymphocytes, Absolute 0.65 (L) 1.00 - 4.80 K/uL    Monocytes, Absolute 0.88 (H) 0.00 - 0.80 K/uL    Eosinophils, Absolute 0.01 0.00 - 0.50 K/uL    Basophils, Absolute 0.02 0.00 - 0.20 K/uL    Immature Granulocytes, Absolute 0.06 (H) 0.00 - 0.04 K/uL    nRBC, Absolute 0.00 <=0.00 x10e3/uL    Diff Type Manual    Manual Differential    Collection Time: 11/21/22  2:58 PM   Result Value Ref Range    Segmented Neutrophils, Man % 77 (H) 50 - 62 %    Bands, Man % 5 1 - 5 %    Lymphocytes, Man % 7 (L) 27 - 41 %    Monocytes, Man % 11 (H) 2 - 6 %    Platelet Morphology Normal Normal    Hypochromic Few    Urinalysis, Reflex to Urine Culture    Collection Time: 11/21/22  3:06 PM    Specimen: Urine   Result Value Ref Range    Color, UA Yellow Colorless, Straw, Yellow, Light Yellow, Dark Yellow    Clarity, UA Clear Clear    pH, UA 6.0 5.0 to 8.0 pH Units    Leukocytes, UA Trace (A) Negative    Nitrites, UA Negative Negative    Protein, UA 20 (A) Negative    Glucose, UA Normal Normal mg/dL    Ketones, UA Trace Negative mg/dL    Urobilinogen, UA Normal 0.2, 1.0, Normal mg/dL    Bilirubin, UA Negative Negative    Blood, UA Negative Negative    Specific Gravity, UA 1.011 <=1.030   Urinalysis, Microscopic    Collection Time: 11/21/22  3:06 PM   Result Value Ref Range    WBC, UA 0-5 None Seen, 0-5 /hpf    RBC, UA 0-3 None Seen, 0-3 /hpf    Bacteria, UA Few (A) None Seen /hpf    Yeast, UA None Seen None Seen /hpf    Squamous Epithelial Cells, UA Rare None Seen, Rare, None Seen To Occasional /lpf    Mucus, UA Rare (A) None Seen /hpf     Trichomonas, UA None Seen None Seen /hpf   SARS-CoV2 (COVID) with FLU Antigen    Collection Time: 11/21/22  4:30 PM   Result Value Ref Range    Influenza A Positive (A) Negative, Invalid    Influenza B Negative Negative, Invalid    COVID-19 Ag Negative Negative, Invalid   Amylase    Collection Time: 11/21/22  4:36 PM   Result Value Ref Range    Amylase 75 25 - 115 U/L   Lipase    Collection Time: 11/21/22  4:36 PM   Result Value Ref Range    Lipase 137 73 - 393 U/L     Lab Results   Component Value Date    GROUPTRH A NEG 10/13/2022          Diagnostic Studies:    Baseline: 150  bpm, Variability: Good {> 6 bpm), and Accelerations: Reactive    labs: see report      ASSESSMENT/PLAN:     Dx:Pregnancy [Z34.90]  Active Hospital Problems    Diagnosis  POA    *Nausea and vomiting during pregnancy [O21.9]  Yes    Influenza A [J10.1]  Yes     .      35 weeks gestation of pregnancy [Z3A.35]  Not Applicable      Resolved Hospital Problems   No resolved problems to display.         Admit to MD: Desmond Lisa MD    Admit to: discharged home   FU with Dr. Ding in 1 week.     observation    Code Status: Full Code       Diagnostic Plan/Orders:  Orders Placed This Encounter   Procedures    US Abdomen Limited_Gallbladder    US OB Limited with Biophysical Profile (xpd)    Urinalysis, Reflex to Urine Culture    CBC Auto Differential    Comprehensive Metabolic Panel    CBC with Differential    Manual Differential    Urinalysis, Microscopic    Amylase    Lipase    SARS-CoV2 (COVID) with FLU Antigen    Hepatitis Panel, Acute    Diet NPO    Vital Signs (Specify Frequency)    Fetal monitoring    Continuous tocometry    Discharge Criteria    Notify clinical provider    Notify clinical provider    Diet message    Full code    Fetal non-stress test    Place in Outpatient        Scheduled Meds/IV Therapy:   oseltamivir, 75 mg, BID         lactated ringers 999 mL/hr at 11/21/22 1739   X 3 liters, IV Zofran, protonix    PRN Meds:     Desmond  AKHIL Lisa MD  OB Hospitalist  Hospitalist phone: [phone]  11/21/2022   6:30 PM

## 2022-11-22 NOTE — DISCHARGE INSTRUCTIONS
Keep all f/u appointments as scheduled. Return to the hospital if you have more than 5 to 6 contractions an hour unrelieved by rest and hydration with a large glass of water; decrease fetal movement; bright red vaginal bleeding; think your water is broken; have constant abdominal pain that will not subside; or any other problems. If you have any questions or concerns, call Labor and Delivery at [phone]. Thank you.

## 2022-12-01 ENCOUNTER — ROUTINE PRENATAL (OUTPATIENT)
Dept: OBSTETRICS AND GYNECOLOGY | Facility: CLINIC | Age: 27
End: 2022-12-01
Payer: MEDICAID

## 2022-12-01 VITALS
HEART RATE: 95 BPM | WEIGHT: 178 LBS | SYSTOLIC BLOOD PRESSURE: 113 MMHG | DIASTOLIC BLOOD PRESSURE: 74 MMHG | BODY MASS INDEX: 30.55 KG/M2

## 2022-12-01 DIAGNOSIS — O26.893 RH NEGATIVE STATUS DURING PREGNANCY IN THIRD TRIMESTER: Primary | ICD-10-CM

## 2022-12-01 DIAGNOSIS — O23.43 URINARY TRACT INFECTION AFFECTING CARE OF MOTHER, ANTEPARTUM, THIRD TRIMESTER: ICD-10-CM

## 2022-12-01 DIAGNOSIS — Z3A.36 36 WEEKS GESTATION OF PREGNANCY: ICD-10-CM

## 2022-12-01 DIAGNOSIS — Z67.91 RH NEGATIVE STATUS DURING PREGNANCY IN THIRD TRIMESTER: Primary | ICD-10-CM

## 2022-12-01 DIAGNOSIS — O47.02: ICD-10-CM

## 2022-12-01 LAB
BILIRUB SERPL-MCNC: NORMAL MG/DL
BLOOD URINE, POC: NORMAL
CHLAMYDIA BY PCR: NEGATIVE
COLOR, POC UA: NORMAL
GLUCOSE UR QL STRIP: NORMAL
KETONES UR QL STRIP: NORMAL
LEUKOCYTE ESTERASE URINE, POC: NORMAL
N. GONORRHOEAE (GC) BY PCR: NEGATIVE
NITRITE, POC UA: NORMAL
PH, POC UA: 7
PROTEIN, POC: NORMAL
SPECIFIC GRAVITY, POC UA: 1.02
UROBILINOGEN, POC UA: 8

## 2022-12-01 PROCEDURE — 99213 OFFICE O/P EST LOW 20 MIN: CPT | Mod: PBBFAC | Performed by: STUDENT IN AN ORGANIZED HEALTH CARE EDUCATION/TRAINING PROGRAM

## 2022-12-01 PROCEDURE — 59426 ANTEPARTUM CARE ONLY: CPT | Mod: S$PBB,TH,, | Performed by: STUDENT IN AN ORGANIZED HEALTH CARE EDUCATION/TRAINING PROGRAM

## 2022-12-01 PROCEDURE — 81003 URINALYSIS AUTO W/O SCOPE: CPT | Mod: PBBFAC | Performed by: STUDENT IN AN ORGANIZED HEALTH CARE EDUCATION/TRAINING PROGRAM

## 2022-12-01 PROCEDURE — 87653 STREP B DNA AMP PROBE: CPT | Mod: ,,, | Performed by: CLINICAL MEDICAL LABORATORY

## 2022-12-01 PROCEDURE — 87491 CHLAMYDIA/GONORRHOEAE(GC), PCR: ICD-10-PCS | Mod: ,,, | Performed by: CLINICAL MEDICAL LABORATORY

## 2022-12-01 PROCEDURE — 87591 CHLAMYDIA/GONORRHOEAE(GC), PCR: ICD-10-PCS | Mod: ,,, | Performed by: CLINICAL MEDICAL LABORATORY

## 2022-12-01 PROCEDURE — 87653 CULTURE, GROUP B STREP: ICD-10-PCS | Mod: ,,, | Performed by: CLINICAL MEDICAL LABORATORY

## 2022-12-01 PROCEDURE — 87591 N.GONORRHOEAE DNA AMP PROB: CPT | Mod: ,,, | Performed by: CLINICAL MEDICAL LABORATORY

## 2022-12-01 PROCEDURE — 59426 PR ANTEPARTUM CARE ONLY, >7 VISITS: ICD-10-PCS | Mod: S$PBB,TH,, | Performed by: STUDENT IN AN ORGANIZED HEALTH CARE EDUCATION/TRAINING PROGRAM

## 2022-12-01 PROCEDURE — 87491 CHLMYD TRACH DNA AMP PROBE: CPT | Mod: ,,, | Performed by: CLINICAL MEDICAL LABORATORY

## 2022-12-01 NOTE — PROGRESS NOTES
Subjective:      Rebekah Gregg is a 27 y.o. female being seen today for her obstetrical visit. She is at 36w5d gestation. Patient reports  contractions and poss.leaking fluid . Fetal movement: normal.    Menstrual History:  OB History          7    Para   2    Term   2            AB   4    Living   2         SAB   4    IAB        Ectopic        Multiple        Live Births   2                    The following portions of the patient's history were reviewed and updated as appropriate: allergies, current medications, past family history, past medical history, past social history, past surgical history, and problem list.    Review of Systems  Pertinent items are noted in HPI.     Objective:      /74   Pulse 95   Wt 80.7 kg (178 lb)   LMP 2022   BMI 30.55 kg/m²   FHT: 130 BPM   Uterine Size: 37 cm   Presentations: cephalic   Pelvic Exam:               Dilation: 3cm        Effacement: 50%              Station:  -3     Consistency: soft             Position: anterior        Assessment:      Pregnancy 36 and 5/7 weeks     Plan:    Plans for delivery: Vaginal anticipated, IOL scheduled for 22.  Schedule US  GBS, GC/C pending  Rx for Prilosec sent to pharmacy.  Follow up in 1 Week.

## 2022-12-02 RX ORDER — OMEPRAZOLE 20 MG/1
20 CAPSULE, DELAYED RELEASE ORAL DAILY
Qty: 30 CAPSULE | Refills: 11 | Status: ON HOLD | OUTPATIENT
Start: 2022-12-02 | End: 2023-02-28

## 2022-12-02 RX ORDER — OMEPRAZOLE 40 MG/1
40 CAPSULE, DELAYED RELEASE ORAL
Status: DISCONTINUED | OUTPATIENT
Start: 2022-12-02 | End: 2022-12-02

## 2022-12-03 ENCOUNTER — HOSPITAL ENCOUNTER (OUTPATIENT)
Facility: HOSPITAL | Age: 27
Discharge: HOME | End: 2022-12-03
Attending: OBSTETRICS & GYNECOLOGY | Admitting: OBSTETRICS & GYNECOLOGY
Payer: MEDICAID

## 2022-12-03 VITALS
HEIGHT: 64 IN | TEMPERATURE: 97 F | DIASTOLIC BLOOD PRESSURE: 67 MMHG | HEART RATE: 108 BPM | WEIGHT: 194.81 LBS | SYSTOLIC BLOOD PRESSURE: 116 MMHG | BODY MASS INDEX: 33.26 KG/M2

## 2022-12-03 DIAGNOSIS — Z34.90 PREGNANT: ICD-10-CM

## 2022-12-03 PROBLEM — O47.03 FALSE LABOR BEFORE 37 COMPLETED WEEKS OF GESTATION IN THIRD TRIMESTER: Status: ACTIVE | Noted: 2022-09-22

## 2022-12-03 PROBLEM — Z3A.37 37 WEEKS GESTATION OF PREGNANCY: Status: ACTIVE | Noted: 2022-05-06

## 2022-12-03 LAB
BACTERIA #/AREA URNS HPF: ABNORMAL /HPF
BILIRUB UR QL STRIP: NEGATIVE
CAOX CRY #/AREA URNS LPF: ABNORMAL /LPF
CLARITY UR: CLEAR
COLOR UR: YELLOW
CULTURE, GROUP B STREP: NORMAL
GLUCOSE UR STRIP-MCNC: NORMAL MG/DL
HYALINE CASTS #/AREA URNS LPF: ABNORMAL /LPF
KETONES UR STRIP-SCNC: ABNORMAL MG/DL
LEUKOCYTE ESTERASE UR QL STRIP: ABNORMAL
MUCOUS THREADS #/AREA URNS HPF: ABNORMAL /HPF
NITRITE UR QL STRIP: NEGATIVE
PH UR STRIP: 6.5 PH UNITS
PROT UR QL STRIP: 50
RBC # UR STRIP: NEGATIVE /UL
RBC #/AREA URNS HPF: ABNORMAL /HPF
SP GR UR STRIP: 1.03
SQUAMOUS #/AREA URNS LPF: ABNORMAL /LPF
TRICHOMONAS #/AREA URNS HPF: ABNORMAL /HPF
UROBILINOGEN UR STRIP-ACNC: 2 MG/DL
WBC #/AREA URNS HPF: ABNORMAL /HPF
YEAST #/AREA URNS HPF: ABNORMAL /HPF

## 2022-12-03 PROCEDURE — 99211 OFF/OP EST MAY X REQ PHY/QHP: CPT

## 2022-12-03 PROCEDURE — 81001 URINALYSIS AUTO W/SCOPE: CPT | Performed by: OBSTETRICS & GYNECOLOGY

## 2022-12-03 PROCEDURE — 87086 URINE CULTURE/COLONY COUNT: CPT | Performed by: OBSTETRICS & GYNECOLOGY

## 2022-12-04 NOTE — PROGRESS NOTES
"[facility] -  Labor and Delivery  Obstetrics  Antepartum Progress Note    Patient Name: Rebekah Gregg  MRN: [id number]  Admission Date: 12/3/2022  Hospital Length of Stay: 0 days  Attending Physician: Bertrand Landin MD  Primary Care Provider: Primary Doctor No    Subjective:     Principal Problem:False labor before 37 completed weeks of gestation in third trimester    HPI:    12/3/22  Pt of Dr Ding    28yo  at 37w0d coming with report of contractions x 2 hours.  "Last baby born at 37 weeks"    No other obstetric complaints    Note on chart about T21 but Panorama report is low risk    Cervial exam in office 3/50/-3 on   And 3/70/-2 on arrival to L&D          Hospital Course:    NST: Reactive    Serial cervical exams - unchanged    UA      Obstetric HPI:  Patient reports onset about 2 hrs prior to arrival contractions, active fetal movement, absent vaginal bleeding , absent loss of fluid      Objective:     Vital Signs (Most Recent):  Temp: 97.5 °F (36.4 °C) (22)  Pulse: 108 (22)  BP: 116/67 (22) Vital Signs (24h Range):  Temp:  [97.5 °F (36.4 °C)] 97.5 °F (36.4 °C)  Pulse:  [108] 108  BP: (116)/(67) 116/67     Weight: 88.4 kg (194 lb 12.8 oz)  Body mass index is 33.44 kg/m².    FHT: 135 Cat 1 (reassuring)  TOCO:  Q irritability minutes    No intake or output data in the 24 hours ending 22    Cervical Exam:  Dilation:  3  Effacement:  70  Station: -2  Presentation: Vertex     Significant Labs:  Recent Lab Results         22        Appearance, UA Clear       Bacteria, UA Moderate       Bilirubin (UA) Negative       Ca Oxalate [place], UA Rare       Color, UA Yellow       Glucose, UA Normal       Hyaline Casts, UA 0-2       Ketones, UA Trace       Leukocytes, UA Small       Mucus, UA Moderate       NITRITE UA Negative       Occult Blood UA Negative       pH, UA 6.5       Protein, UA 50       RBC, UA None Seen       Specific Gravity, UA " 1.029       Squam Epithel, UA Occasional       Trichomonas, UA None Seen       UROBILINOGEN UA 2       WBC, UA 0-5       Yeast, UA None Seen               Physical Exam:   Constitutional: She is oriented to person, place, and time. She appears well-developed and well-nourished.    HENT:   Head: Normocephalic and atraumatic.    Eyes: Pupils are equal, round, and reactive to light.     Cardiovascular:  Normal rate.      Exam reveals no edema.        Pulmonary/Chest: Effort normal. No respiratory distress.        Abdominal: Soft. She exhibits no distension and no mass. There is no abdominal tenderness. There is no rebound and no guarding.     Gravid, uterus nontender     Genitourinary:    Vagina normal.      Pelvic exam was performed with patient supine.   The external female genitalia was normal.   No external genitalia lesions identified,Genitalia hair distrobution normal .   No  no vaginal discharge in the vagina.           Musculoskeletal: Normal range of motion.       Neurological: She is alert and oriented to person, place, and time. She exhibits normal muscle tone.    Skin: Skin is warm and dry. No rash noted. No erythema.    Psychiatric: She has a normal mood and affect. Her behavior is normal.     Review of Systems   Constitutional:  Negative for chills and fever.   Respiratory:  Negative for cough and shortness of breath.    Cardiovascular:  Negative for chest pain and leg swelling.   Gastrointestinal:  Negative for abdominal pain, constipation, diarrhea, nausea and vomiting.   Genitourinary:  Positive for pelvic pain (? contractions). Negative for dysuria, flank pain, frequency, hematuria, urgency, vaginal bleeding, vaginal discharge, vaginal pain and vaginal odor.   Musculoskeletal:  Negative for back pain.   Neurological:  Negative for headaches.   Psychiatric/Behavioral:  Negative for depression. The patient is not nervous/anxious.      Assessment/Plan:     27 y.o. female  at 37w0d for:    * False  labor before 37 completed weeks of gestation in third trimester    Obstetric precautions    Pelvic pain affecting pregnancy in third trimester, antepartum  False labor    Rh negative status during pregnancy in first trimester    noted    37 weeks gestation of pregnancy    Continue prenatal care            Bertrand Landin MD  Obstetrics  [facility] -  Labor and Delivery

## 2022-12-04 NOTE — HPI
"  12/3/22  Pt of Dr Ding    28yo  at 37w0d coming with report of contractions x 2 hours.  "Last baby born at 37 weeks"    No other obstetric complaints    Note on chart about T21 but Panorama report is low risk    Cervial exam in office 3/50/-3 on   And 3/70/- on arrival to L&D      "

## 2022-12-05 LAB — UA COMPLETE W REFLEX CULTURE PNL UR: NORMAL

## 2022-12-06 ENCOUNTER — HOSPITAL ENCOUNTER (OUTPATIENT)
Dept: RADIOLOGY | Facility: HOSPITAL | Age: 27
Discharge: HOME | End: 2022-12-06
Attending: STUDENT IN AN ORGANIZED HEALTH CARE EDUCATION/TRAINING PROGRAM
Payer: MEDICAID

## 2022-12-06 ENCOUNTER — ROUTINE PRENATAL (OUTPATIENT)
Dept: OBSTETRICS AND GYNECOLOGY | Facility: CLINIC | Age: 27
End: 2022-12-06
Payer: MEDICAID

## 2022-12-06 VITALS — SYSTOLIC BLOOD PRESSURE: 132 MMHG | BODY MASS INDEX: 31.24 KG/M2 | DIASTOLIC BLOOD PRESSURE: 78 MMHG | WEIGHT: 182 LBS

## 2022-12-06 DIAGNOSIS — O26.893 RH NEGATIVE STATUS DURING PREGNANCY IN THIRD TRIMESTER: Primary | ICD-10-CM

## 2022-12-06 DIAGNOSIS — Z67.91 RH NEGATIVE STATUS DURING PREGNANCY IN THIRD TRIMESTER: Primary | ICD-10-CM

## 2022-12-06 DIAGNOSIS — Z3A.36 36 WEEKS GESTATION OF PREGNANCY: ICD-10-CM

## 2022-12-06 DIAGNOSIS — Z3A.37 37 WEEKS GESTATION OF PREGNANCY: ICD-10-CM

## 2022-12-06 LAB
BILIRUB SERPL-MCNC: NORMAL MG/DL
BLOOD URINE, POC: NORMAL
COLOR, POC UA: NORMAL
GLUCOSE UR QL STRIP: NORMAL
KETONES UR QL STRIP: NORMAL
LEUKOCYTE ESTERASE URINE, POC: NORMAL
NITRITE, POC UA: NORMAL
PH, POC UA: 6.5
PROTEIN, POC: NORMAL
SPECIFIC GRAVITY, POC UA: 1.02
UROBILINOGEN, POC UA: 0.2

## 2022-12-06 PROCEDURE — 76816 US OB FOLLOW UP EA GESTATION TRANSABDOMINAL: ICD-10-PCS | Mod: 26,,, | Performed by: RADIOLOGY

## 2022-12-06 PROCEDURE — 76816 OB US FOLLOW-UP PER FETUS: CPT | Mod: TC

## 2022-12-06 PROCEDURE — 99213 OFFICE O/P EST LOW 20 MIN: CPT | Mod: PBBFAC | Performed by: STUDENT IN AN ORGANIZED HEALTH CARE EDUCATION/TRAINING PROGRAM

## 2022-12-06 PROCEDURE — 59426 PR ANTEPARTUM CARE ONLY, >7 VISITS: ICD-10-PCS | Mod: S$PBB,TH,, | Performed by: STUDENT IN AN ORGANIZED HEALTH CARE EDUCATION/TRAINING PROGRAM

## 2022-12-06 PROCEDURE — 81003 URINALYSIS AUTO W/O SCOPE: CPT | Mod: PBBFAC | Performed by: STUDENT IN AN ORGANIZED HEALTH CARE EDUCATION/TRAINING PROGRAM

## 2022-12-06 PROCEDURE — 76816 OB US FOLLOW-UP PER FETUS: CPT | Mod: 26,,, | Performed by: RADIOLOGY

## 2022-12-06 PROCEDURE — 59426 ANTEPARTUM CARE ONLY: CPT | Mod: S$PBB,TH,, | Performed by: STUDENT IN AN ORGANIZED HEALTH CARE EDUCATION/TRAINING PROGRAM

## 2022-12-06 NOTE — PROGRESS NOTES
Subjective:      Rebekah Gregg is a 27 y.o. female being seen today for her obstetrical visit. She is at 37w3d gestation. Patient reports contractions since Saturday . Fetal movement: normal.    Menstrual History:  OB History          7    Para   2    Term   2            AB   4    Living   2         SAB   4    IAB        Ectopic        Multiple        Live Births   2                    The following portions of the patient's history were reviewed and updated as appropriate: allergies, current medications, past family history, past medical history, past social history, past surgical history, and problem list.    Review of Systems  Pertinent items are noted in HPI.     Objective:      /78   Wt 82.6 kg (182 lb)   LMP 2022   BMI 31.24 kg/m²   FHT: 128 BPM   Uterine Size: size equals dates   Presentations: cephalic   Pelvic Exam:               Dilation: 4 cmm        Effacement: 50%              Station:  -3     Consistency: soft             Position: anterior        Assessment:      Pregnancy 37 and 3/7 weeks     Plan:    Plans for delivery: Vaginal anticipated  Follow up in 1 Week.

## 2022-12-09 ENCOUNTER — HOSPITAL ENCOUNTER (INPATIENT)
Facility: HOSPITAL | Age: 27
LOS: 2 days | Discharge: HOME | End: 2022-12-12
Attending: OBSTETRICS & GYNECOLOGY | Admitting: OBSTETRICS & GYNECOLOGY
Payer: MEDICAID

## 2022-12-09 DIAGNOSIS — Z34.93 PREGNANT AND NOT YET DELIVERED IN THIRD TRIMESTER: Primary | ICD-10-CM

## 2022-12-09 DIAGNOSIS — Z3A.38 38 WEEKS GESTATION OF PREGNANCY: ICD-10-CM

## 2022-12-09 LAB
ALBUMIN SERPL BCP-MCNC: 2.4 G/DL (ref 3.5–5)
ALBUMIN/GLOB SERPL: 0.6 {RATIO}
ALP SERPL-CCNC: 242 U/L (ref 37–98)
ALT SERPL W P-5'-P-CCNC: 111 U/L (ref 13–56)
ANION GAP SERPL CALCULATED.3IONS-SCNC: 11 MMOL/L (ref 7–16)
AST SERPL W P-5'-P-CCNC: 115 U/L (ref 15–37)
BACTERIA #/AREA URNS HPF: ABNORMAL /HPF
BASOPHILS # BLD AUTO: 0.04 K/UL (ref 0–0.2)
BASOPHILS NFR BLD AUTO: 0.4 % (ref 0–1)
BILIRUB SERPL-MCNC: 1.1 MG/DL (ref ?–1.2)
BILIRUB UR QL STRIP: 0.5
BUN SERPL-MCNC: 7 MG/DL (ref 7–18)
BUN/CREAT SERPL: 9 (ref 6–20)
CALCIUM SERPL-MCNC: 8.3 MG/DL (ref 8.5–10.1)
CHLORIDE SERPL-SCNC: 101 MMOL/L (ref 98–107)
CLARITY UR: CLEAR
CO2 SERPL-SCNC: 23 MMOL/L (ref 21–32)
COLOR UR: YELLOW
CREAT SERPL-MCNC: 0.75 MG/DL (ref 0.55–1.02)
DIFFERENTIAL METHOD BLD: ABNORMAL
EGFR (NO RACE VARIABLE) (RUSH/TITUS): 111 ML/MIN/1.73M²
EOSINOPHIL # BLD AUTO: 0.06 K/UL (ref 0–0.5)
EOSINOPHIL NFR BLD AUTO: 0.6 % (ref 1–4)
ERYTHROCYTE [DISTWIDTH] IN BLOOD BY AUTOMATED COUNT: 14.1 % (ref 11.5–14.5)
GLOBULIN SER-MCNC: 4.2 G/DL (ref 2–4)
GLUCOSE SERPL-MCNC: 92 MG/DL (ref 74–106)
GLUCOSE UR STRIP-MCNC: NORMAL MG/DL
HCT VFR BLD AUTO: 30.2 % (ref 38–47)
HGB BLD-MCNC: 10.1 G/DL (ref 12–16)
IMM GRANULOCYTES # BLD AUTO: 0.09 K/UL (ref 0–0.04)
IMM GRANULOCYTES NFR BLD: 0.9 % (ref 0–0.4)
KETONES UR STRIP-SCNC: NEGATIVE MG/DL
LEUKOCYTE ESTERASE UR QL STRIP: ABNORMAL
LYMPHOCYTES # BLD AUTO: 2.31 K/UL (ref 1–4.8)
LYMPHOCYTES NFR BLD AUTO: 23.6 % (ref 27–41)
MCH RBC QN AUTO: 28.9 PG (ref 27–31)
MCHC RBC AUTO-ENTMCNC: 33.4 G/DL (ref 32–36)
MCV RBC AUTO: 86.5 FL (ref 80–96)
MONOCYTES # BLD AUTO: 0.66 K/UL (ref 0–0.8)
MONOCYTES NFR BLD AUTO: 6.8 % (ref 2–6)
MPC BLD CALC-MCNC: 10.5 FL (ref 9.4–12.4)
NEUTROPHILS # BLD AUTO: 6.61 K/UL (ref 1.8–7.7)
NEUTROPHILS NFR BLD AUTO: 67.7 % (ref 53–65)
NITRITE UR QL STRIP: NEGATIVE
NRBC # BLD AUTO: 0 X10E3/UL
NRBC, AUTO (.00): 0 %
PH UR STRIP: 7 PH UNITS
PLATELET # BLD AUTO: 235 K/UL (ref 150–400)
POTASSIUM SERPL-SCNC: 3.7 MMOL/L (ref 3.5–5.1)
PROT SERPL-MCNC: 6.6 G/DL (ref 6.4–8.2)
PROT UR QL STRIP: 50
RBC # BLD AUTO: 3.49 M/UL (ref 4.2–5.4)
RBC # UR STRIP: NEGATIVE /UL
RBC #/AREA URNS HPF: ABNORMAL /HPF
SODIUM SERPL-SCNC: 131 MMOL/L (ref 136–145)
SP GR UR STRIP: 1.03
SQUAMOUS #/AREA URNS LPF: ABNORMAL /LPF
UROBILINOGEN UR STRIP-ACNC: 4 MG/DL
WBC # BLD AUTO: 9.77 K/UL (ref 4.5–11)
WBC #/AREA URNS HPF: ABNORMAL /HPF
YEAST #/AREA URNS HPF: ABNORMAL /HPF

## 2022-12-09 PROCEDURE — 86780 TREPONEMA PALLIDUM: CPT | Performed by: OBSTETRICS & GYNECOLOGY

## 2022-12-09 PROCEDURE — 11000001 HC ACUTE MED/SURG PRIVATE ROOM

## 2022-12-09 PROCEDURE — 36415 COLL VENOUS BLD VENIPUNCTURE: CPT | Performed by: OBSTETRICS & GYNECOLOGY

## 2022-12-09 PROCEDURE — 86870 RBC ANTIBODY IDENTIFICATION: CPT | Performed by: OBSTETRICS & GYNECOLOGY

## 2022-12-09 PROCEDURE — 80053 COMPREHEN METABOLIC PANEL: CPT | Performed by: OBSTETRICS & GYNECOLOGY

## 2022-12-09 PROCEDURE — 86850 RBC ANTIBODY SCREEN: CPT | Performed by: OBSTETRICS & GYNECOLOGY

## 2022-12-09 PROCEDURE — 63600175 PHARM REV CODE 636 W HCPCS: Performed by: OBSTETRICS & GYNECOLOGY

## 2022-12-09 PROCEDURE — 81003 URINALYSIS AUTO W/O SCOPE: CPT | Performed by: OBSTETRICS & GYNECOLOGY

## 2022-12-09 PROCEDURE — 85025 COMPLETE CBC W/AUTO DIFF WBC: CPT | Performed by: OBSTETRICS & GYNECOLOGY

## 2022-12-09 PROCEDURE — 81001 URINALYSIS AUTO W/SCOPE: CPT | Performed by: OBSTETRICS & GYNECOLOGY

## 2022-12-09 PROCEDURE — 87340 HEPATITIS B SURFACE AG IA: CPT | Performed by: OBSTETRICS & GYNECOLOGY

## 2022-12-09 PROCEDURE — 25000003 PHARM REV CODE 250: Performed by: OBSTETRICS & GYNECOLOGY

## 2022-12-09 PROCEDURE — 87389 HIV-1 AG W/HIV-1&-2 AB AG IA: CPT | Performed by: OBSTETRICS & GYNECOLOGY

## 2022-12-09 RX ORDER — METHYLERGONOVINE MALEATE 0.2 MG/ML
200 INJECTION INTRAVENOUS
Status: DISCONTINUED | OUTPATIENT
Start: 2022-12-09 | End: 2022-12-12 | Stop reason: HOSPADM

## 2022-12-09 RX ORDER — LIDOCAINE HYDROCHLORIDE 10 MG/ML
10 INJECTION INFILTRATION; PERINEURAL ONCE AS NEEDED
Status: DISCONTINUED | OUTPATIENT
Start: 2022-12-09 | End: 2022-12-10

## 2022-12-09 RX ORDER — ONDANSETRON 4 MG/1
8 TABLET, ORALLY DISINTEGRATING ORAL EVERY 8 HOURS PRN
Status: DISCONTINUED | OUTPATIENT
Start: 2022-12-09 | End: 2022-12-10

## 2022-12-09 RX ORDER — CALCIUM CARBONATE 200(500)MG
500 TABLET,CHEWABLE ORAL 3 TIMES DAILY PRN
Status: DISCONTINUED | OUTPATIENT
Start: 2022-12-09 | End: 2022-12-12 | Stop reason: HOSPADM

## 2022-12-09 RX ORDER — SIMETHICONE 80 MG
1 TABLET,CHEWABLE ORAL 4 TIMES DAILY PRN
Status: DISCONTINUED | OUTPATIENT
Start: 2022-12-09 | End: 2022-12-12 | Stop reason: HOSPADM

## 2022-12-09 RX ORDER — DIPHENOXYLATE HYDROCHLORIDE AND ATROPINE SULFATE 2.5; .025 MG/1; MG/1
1 TABLET ORAL 4 TIMES DAILY PRN
Status: DISCONTINUED | OUTPATIENT
Start: 2022-12-09 | End: 2022-12-12 | Stop reason: HOSPADM

## 2022-12-09 RX ORDER — OXYTOCIN/RINGER'S LACTATE 30/500 ML
334 PLASTIC BAG, INJECTION (ML) INTRAVENOUS ONCE
Status: DISCONTINUED | OUTPATIENT
Start: 2022-12-09 | End: 2022-12-10

## 2022-12-09 RX ORDER — SODIUM CHLORIDE 9 MG/ML
INJECTION, SOLUTION INTRAVENOUS
Status: DISCONTINUED | OUTPATIENT
Start: 2022-12-09 | End: 2022-12-10

## 2022-12-09 RX ORDER — SODIUM CHLORIDE, SODIUM LACTATE, POTASSIUM CHLORIDE, CALCIUM CHLORIDE 600; 310; 30; 20 MG/100ML; MG/100ML; MG/100ML; MG/100ML
INJECTION, SOLUTION INTRAVENOUS CONTINUOUS
Status: DISCONTINUED | OUTPATIENT
Start: 2022-12-09 | End: 2022-12-10

## 2022-12-09 RX ORDER — BUTORPHANOL TARTRATE 2 MG/ML
2 INJECTION INTRAMUSCULAR; INTRAVENOUS
Status: DISCONTINUED | OUTPATIENT
Start: 2022-12-09 | End: 2022-12-10

## 2022-12-09 RX ORDER — SODIUM CHLORIDE, SODIUM LACTATE, POTASSIUM CHLORIDE, CALCIUM CHLORIDE 600; 310; 30; 20 MG/100ML; MG/100ML; MG/100ML; MG/100ML
INJECTION, SOLUTION INTRAVENOUS CONTINUOUS
Status: DISCONTINUED | OUTPATIENT
Start: 2022-12-10 | End: 2022-12-10

## 2022-12-09 RX ORDER — OXYTOCIN/RINGER'S LACTATE 30/500 ML
95 PLASTIC BAG, INJECTION (ML) INTRAVENOUS ONCE
Status: DISCONTINUED | OUTPATIENT
Start: 2022-12-09 | End: 2022-12-10

## 2022-12-09 RX ORDER — MUPIROCIN 20 MG/G
OINTMENT TOPICAL
Status: DISCONTINUED | OUTPATIENT
Start: 2022-12-09 | End: 2022-12-10

## 2022-12-09 RX ORDER — CARBOPROST TROMETHAMINE 250 UG/ML
250 INJECTION, SOLUTION INTRAMUSCULAR
Status: DISCONTINUED | OUTPATIENT
Start: 2022-12-09 | End: 2022-12-12 | Stop reason: HOSPADM

## 2022-12-09 RX ORDER — PROCHLORPERAZINE EDISYLATE 5 MG/ML
5 INJECTION INTRAMUSCULAR; INTRAVENOUS EVERY 6 HOURS PRN
Status: DISCONTINUED | OUTPATIENT
Start: 2022-12-09 | End: 2022-12-10

## 2022-12-09 RX ORDER — MISOPROSTOL 200 UG/1
800 TABLET ORAL
Status: DISCONTINUED | OUTPATIENT
Start: 2022-12-09 | End: 2022-12-12 | Stop reason: HOSPADM

## 2022-12-09 RX ORDER — ACETAMINOPHEN 500 MG
500 TABLET ORAL EVERY 6 HOURS PRN
Status: DISCONTINUED | OUTPATIENT
Start: 2022-12-09 | End: 2022-12-10

## 2022-12-09 RX ORDER — FAMOTIDINE 10 MG/ML
20 INJECTION INTRAVENOUS 2 TIMES DAILY PRN
Status: DISCONTINUED | OUTPATIENT
Start: 2022-12-09 | End: 2022-12-12 | Stop reason: HOSPADM

## 2022-12-09 RX ORDER — OXYTOCIN/RINGER'S LACTATE 30/500 ML
0-30 PLASTIC BAG, INJECTION (ML) INTRAVENOUS CONTINUOUS
Status: DISCONTINUED | OUTPATIENT
Start: 2022-12-10 | End: 2022-12-10

## 2022-12-09 RX ORDER — BUTORPHANOL TARTRATE 2 MG/ML
1 INJECTION INTRAMUSCULAR; INTRAVENOUS
Status: DISCONTINUED | OUTPATIENT
Start: 2022-12-09 | End: 2022-12-10

## 2022-12-09 RX ADMIN — SODIUM CHLORIDE, POTASSIUM CHLORIDE, SODIUM LACTATE AND CALCIUM CHLORIDE: 600; 310; 30; 20 INJECTION, SOLUTION INTRAVENOUS at 11:12

## 2022-12-09 RX ADMIN — FAMOTIDINE 20 MG: 10 INJECTION, SOLUTION INTRAVENOUS at 11:12

## 2022-12-10 ENCOUNTER — ANESTHESIA EVENT (OUTPATIENT)
Dept: OBSTETRICS AND GYNECOLOGY | Facility: HOSPITAL | Age: 27
End: 2022-12-10
Payer: MEDICAID

## 2022-12-10 ENCOUNTER — ANESTHESIA (OUTPATIENT)
Dept: OBSTETRICS AND GYNECOLOGY | Facility: HOSPITAL | Age: 27
End: 2022-12-10
Payer: MEDICAID

## 2022-12-10 LAB
ANTIBODY IDENTIFICATION: NORMAL
HBV SURFACE AG SERPL QL IA: NORMAL
HIV 1+O+2 AB SERPL QL: NORMAL
INDIRECT COOMBS: ABNORMAL
RH BLD: ABNORMAL
RH IMMUNE GLOBULIN: NORMAL
ROSETTE - FMH (FETAL BLEED SCREEN): NORMAL
SYPHILIS AB INTERPRETATION: NORMAL

## 2022-12-10 PROCEDURE — 59410 OBSTETRICAL CARE: CPT | Mod: ,,, | Performed by: ANESTHESIOLOGY

## 2022-12-10 PROCEDURE — 63600519 RHOGAM PHARM REV CODE 636 ALT 250 W HCPCS: Performed by: OBSTETRICS & GYNECOLOGY

## 2022-12-10 PROCEDURE — 25000003 PHARM REV CODE 250: Performed by: ANESTHESIOLOGY

## 2022-12-10 PROCEDURE — 11000001 HC ACUTE MED/SURG PRIVATE ROOM

## 2022-12-10 PROCEDURE — 63600175 PHARM REV CODE 636 W HCPCS: Performed by: OBSTETRICS & GYNECOLOGY

## 2022-12-10 PROCEDURE — 25000003 PHARM REV CODE 250: Performed by: OBSTETRICS & GYNECOLOGY

## 2022-12-10 PROCEDURE — 85461 HEMOGLOBIN FETAL: CPT | Performed by: OBSTETRICS & GYNECOLOGY

## 2022-12-10 PROCEDURE — 63600175 PHARM REV CODE 636 W HCPCS: Performed by: ANESTHESIOLOGY

## 2022-12-10 PROCEDURE — 62326 NJX INTERLAMINAR LMBR/SAC: CPT | Performed by: ANESTHESIOLOGY

## 2022-12-10 PROCEDURE — 72200005 HC VAGINAL DELIVERY LEVEL II

## 2022-12-10 PROCEDURE — 59410 PRA OBSTE CARE,VAG DELIV+POSTPARTUM: ICD-10-PCS | Mod: ,,, | Performed by: ANESTHESIOLOGY

## 2022-12-10 PROCEDURE — 72100002 HC LABOR CARE, 1ST 8 HOURS

## 2022-12-10 PROCEDURE — 36415 COLL VENOUS BLD VENIPUNCTURE: CPT | Performed by: OBSTETRICS & GYNECOLOGY

## 2022-12-10 RX ORDER — PRENATAL WITH FERROUS FUM AND FOLIC ACID 3080; 920; 120; 400; 22; 1.84; 3; 20; 10; 1; 12; 200; 27; 25; 2 [IU]/1; [IU]/1; MG/1; [IU]/1; MG/1; MG/1; MG/1; MG/1; MG/1; MG/1; UG/1; MG/1; MG/1; MG/1; MG/1
1 TABLET ORAL DAILY
Status: DISCONTINUED | OUTPATIENT
Start: 2022-12-10 | End: 2022-12-12 | Stop reason: HOSPADM

## 2022-12-10 RX ORDER — ACETAMINOPHEN 325 MG/1
650 TABLET ORAL EVERY 6 HOURS PRN
Status: DISCONTINUED | OUTPATIENT
Start: 2022-12-10 | End: 2022-12-12 | Stop reason: HOSPADM

## 2022-12-10 RX ORDER — ONDANSETRON 4 MG/1
8 TABLET, ORALLY DISINTEGRATING ORAL EVERY 8 HOURS PRN
Status: DISCONTINUED | OUTPATIENT
Start: 2022-12-10 | End: 2022-12-12 | Stop reason: HOSPADM

## 2022-12-10 RX ORDER — EPHEDRINE SULFATE 50 MG/ML
INJECTION, SOLUTION INTRAVENOUS
Status: COMPLETED
Start: 2022-12-10 | End: 2022-12-10

## 2022-12-10 RX ORDER — SODIUM CITRATE AND CITRIC ACID MONOHYDRATE 334; 500 MG/5ML; MG/5ML
30 SOLUTION ORAL ONCE AS NEEDED
Status: DISCONTINUED | OUTPATIENT
Start: 2022-12-10 | End: 2022-12-10

## 2022-12-10 RX ORDER — ROPIVACAINE HYDROCHLORIDE 7.5 MG/ML
INJECTION, SOLUTION EPIDURAL; PERINEURAL
Status: COMPLETED | OUTPATIENT
Start: 2022-12-10 | End: 2022-12-10

## 2022-12-10 RX ORDER — DIPHENHYDRAMINE HCL 25 MG
25 CAPSULE ORAL EVERY 4 HOURS PRN
Status: DISCONTINUED | OUTPATIENT
Start: 2022-12-10 | End: 2022-12-12 | Stop reason: HOSPADM

## 2022-12-10 RX ORDER — FAMOTIDINE 10 MG/ML
20 INJECTION INTRAVENOUS ONCE AS NEEDED
Status: DISCONTINUED | OUTPATIENT
Start: 2022-12-10 | End: 2022-12-12 | Stop reason: HOSPADM

## 2022-12-10 RX ORDER — EPHEDRINE SULFATE 50 MG/ML
10 INJECTION, SOLUTION INTRAVENOUS ONCE AS NEEDED
Status: DISCONTINUED | OUTPATIENT
Start: 2022-12-10 | End: 2022-12-10

## 2022-12-10 RX ORDER — DOCUSATE SODIUM 100 MG/1
200 CAPSULE, LIQUID FILLED ORAL 2 TIMES DAILY PRN
Status: DISCONTINUED | OUTPATIENT
Start: 2022-12-10 | End: 2022-12-12 | Stop reason: HOSPADM

## 2022-12-10 RX ORDER — FENTANYL/ROPIVACAINE/NS/PF 2MCG/ML-.2
10 PLASTIC BAG, INJECTION (ML) INJECTION CONTINUOUS
Status: DISCONTINUED | OUTPATIENT
Start: 2022-12-10 | End: 2022-12-10

## 2022-12-10 RX ORDER — HYDROCORTISONE 25 MG/G
CREAM TOPICAL 3 TIMES DAILY PRN
Status: DISCONTINUED | OUTPATIENT
Start: 2022-12-10 | End: 2022-12-12 | Stop reason: HOSPADM

## 2022-12-10 RX ORDER — IBUPROFEN 800 MG/1
800 TABLET ORAL EVERY 8 HOURS PRN
Status: DISCONTINUED | OUTPATIENT
Start: 2022-12-10 | End: 2022-12-12 | Stop reason: HOSPADM

## 2022-12-10 RX ORDER — ONDANSETRON 2 MG/ML
4 INJECTION INTRAMUSCULAR; INTRAVENOUS EVERY 6 HOURS PRN
Status: DISCONTINUED | OUTPATIENT
Start: 2022-12-10 | End: 2022-12-10

## 2022-12-10 RX ORDER — DIPHENHYDRAMINE HYDROCHLORIDE 50 MG/ML
25 INJECTION INTRAMUSCULAR; INTRAVENOUS EVERY 4 HOURS PRN
Status: DISCONTINUED | OUTPATIENT
Start: 2022-12-10 | End: 2022-12-12 | Stop reason: HOSPADM

## 2022-12-10 RX ORDER — SODIUM CHLORIDE 0.9 % (FLUSH) 0.9 %
10 SYRINGE (ML) INJECTION
Status: DISCONTINUED | OUTPATIENT
Start: 2022-12-10 | End: 2022-12-12 | Stop reason: HOSPADM

## 2022-12-10 RX ORDER — PROCHLORPERAZINE EDISYLATE 5 MG/ML
5 INJECTION INTRAMUSCULAR; INTRAVENOUS EVERY 6 HOURS PRN
Status: DISCONTINUED | OUTPATIENT
Start: 2022-12-10 | End: 2022-12-12 | Stop reason: HOSPADM

## 2022-12-10 RX ORDER — OXYCODONE AND ACETAMINOPHEN 10; 325 MG/1; MG/1
1 TABLET ORAL EVERY 4 HOURS PRN
Status: DISCONTINUED | OUTPATIENT
Start: 2022-12-10 | End: 2022-12-12 | Stop reason: HOSPADM

## 2022-12-10 RX ORDER — HYDROCODONE BITARTRATE AND ACETAMINOPHEN 5; 325 MG/1; MG/1
1 TABLET ORAL EVERY 4 HOURS PRN
Status: DISCONTINUED | OUTPATIENT
Start: 2022-12-10 | End: 2022-12-12 | Stop reason: HOSPADM

## 2022-12-10 RX ORDER — OXYTOCIN/RINGER'S LACTATE 30/500 ML
95 PLASTIC BAG, INJECTION (ML) INTRAVENOUS ONCE
Status: DISCONTINUED | OUTPATIENT
Start: 2022-12-10 | End: 2022-12-10

## 2022-12-10 RX ORDER — IBUPROFEN 600 MG/1
600 TABLET ORAL EVERY 6 HOURS
Status: DISCONTINUED | OUTPATIENT
Start: 2022-12-10 | End: 2022-12-10

## 2022-12-10 RX ORDER — SIMETHICONE 80 MG
1 TABLET,CHEWABLE ORAL EVERY 6 HOURS PRN
Status: DISCONTINUED | OUTPATIENT
Start: 2022-12-10 | End: 2022-12-12 | Stop reason: HOSPADM

## 2022-12-10 RX ADMIN — ACETAMINOPHEN 650 MG: 325 TABLET ORAL at 09:12

## 2022-12-10 RX ADMIN — ONDANSETRON 8 MG: 4 TABLET, ORALLY DISINTEGRATING ORAL at 03:12

## 2022-12-10 RX ADMIN — HYDROCODONE BITARTRATE AND ACETAMINOPHEN 1 TABLET: 5; 325 TABLET ORAL at 07:12

## 2022-12-10 RX ADMIN — MISOPROSTOL 800 MCG: 200 TABLET ORAL at 05:12

## 2022-12-10 RX ADMIN — SODIUM CHLORIDE, POTASSIUM CHLORIDE, SODIUM LACTATE AND CALCIUM CHLORIDE: 600; 310; 30; 20 INJECTION, SOLUTION INTRAVENOUS at 12:12

## 2022-12-10 RX ADMIN — Medication 2 MILLI-UNITS/MIN: at 02:12

## 2022-12-10 RX ADMIN — Medication 1 TABLET: at 09:12

## 2022-12-10 RX ADMIN — IBUPROFEN 600 MG: 600 TABLET, FILM COATED ORAL at 07:12

## 2022-12-10 RX ADMIN — ROPIVACAINE HYDROCHLORIDE 10 ML/HR: 10 INJECTION, SOLUTION EPIDURAL at 01:12

## 2022-12-10 RX ADMIN — ROPIVACAINE HYDROCHLORIDE 5 ML: 7.5 INJECTION, SOLUTION EPIDURAL; PERINEURAL at 01:12

## 2022-12-10 RX ADMIN — HUMAN RHO(D) IMMUNE GLOBULIN 300 MCG: 1500 SOLUTION INTRAMUSCULAR; INTRAVENOUS at 08:12

## 2022-12-10 RX ADMIN — TRANEXAMIC ACID 1000 MG: 100 INJECTION, SOLUTION INTRAVENOUS at 05:12

## 2022-12-10 NOTE — ANESTHESIA PREPROCEDURE EVALUATION
12/10/2022  Rebekah Gregg is a 27 y.o., female.      Pre-op Assessment    I have reviewed the Patient Summary Reports.     I have reviewed the Nursing Notes. I have reviewed the NPO Status.   I have reviewed the Medications.     Review of Systems  Anesthesia Hx:  No problems with previous Anesthesia    Social:  Non-Smoker, No Alcohol Use    Hematology/Oncology:  Hematology Normal   Oncology Normal     EENT/Dental:EENT/Dental Normal   Cardiovascular:  Cardiovascular Normal     Pulmonary:  Pulmonary Normal    Renal/:  Renal/ Normal     Hepatic/GI:  Hepatic/GI Normal    Musculoskeletal:  Musculoskeletal Normal    OB/GYN/PEDS:  Iup, term   Neurological:  Neurology Normal    Endocrine:  Endocrine Normal    Dermatological:  Skin Normal    Psych:  Psychiatric Normal           Physical Exam  General: Well nourished    Airway:  Mallampati: III / III  Mouth Opening: Normal  TM Distance: > 6 cm  Tongue: Normal  Neck ROM: Normal ROM    Chest/Lungs:  Clear to auscultation, Normal Respiratory Rate    Heart:  Rate: Normal  Rhythm: Regular Rhythm        Anesthesia Plan  Type of Anesthesia, risks & benefits discussed:    Anesthesia Type: Epidural  Intra-op Monitoring Plan: Standard ASA Monitors  Post Op Pain Control Plan: epidural analgesia  Informed Consent: Informed consent signed with the Patient and all parties understand the risks and agree with anesthesia plan.  All questions answered.   ASA Score: 2  Day of Surgery Review of History & Physical: H&P Update referred to the surgeon/provider.I have interviewed and examined the patient. I have reviewed the patient's H&P dated: There are no significant changes. H&P completed by Anesthesiologist.    Ready For Surgery From Anesthesia Perspective.     .

## 2022-12-10 NOTE — L&D DELIVERY NOTE
Obhg delivery note    Status post  a viable male infant; Apgars 8 and 9; vertex; occiput anterior; placenta complete/[place]a/intact; no lacerations or tears; fundus firm after vigorous massage and IV Pitocin given; patient given 600 mcg Cytotec rectally and 200 mcg Cytotec orally along with 1 g IV Lysteda for postpartum hemorrhage prophylaxis; no complications; estimated blood loss 150 cc

## 2022-12-10 NOTE — ANESTHESIA POSTPROCEDURE EVALUATION
Anesthesia Post Evaluation    Patient: Rebekah Gregg    Procedure(s) Performed: * No procedures listed *    Final Anesthesia Type: epidural      Patient location during evaluation: labor & delivery  Patient participation: Yes- Able to Participate  Level of consciousness: awake and sedated  Post-procedure vital signs: reviewed and stable  Pain management: adequate  Airway patency: patent    PONV status at discharge: No PONV  Anesthetic complications: no      Cardiovascular status: blood pressure returned to baseline  Respiratory status: unassisted  Hydration status: euvolemic  Follow-up not needed.          Vitals Value Taken Time   /60 12/10/22 0715   Temp 37.4 °C (99.3 °F) 12/10/22 0703   Pulse 55 12/10/22 0715   Resp 18 12/10/22 0703   SpO2 99 % 12/10/22 0514   Vitals shown include unvalidated device data.      No case tracking events are documented in the log.      Pain/Gaby Score: Pain Rating Prior to Med Admin: 5 (12/10/2022  7:03 AM)

## 2022-12-10 NOTE — ANESTHESIA PROCEDURE NOTES
Epidural    Patient location during procedure: OB   Reason for block: primary anesthetic   Reason for block: labor analgesia requested by patient and obstetrician  Diagnosis: iup, term   Start time: 12/10/2022 1:10 AM  Timeout: 12/10/2022 1:10 AM    Staffing  Performing Provider: Maxwell Dee MD  Authorizing Provider: Maxwell Dee MD        Preanesthetic Checklist  Completed: patient identified, IV checked, site marked, risks and benefits discussed, surgical consent, monitors and equipment checked, pre-op evaluation, timeout performed, anesthesia consent given, hand hygiene performed and patient being monitored  Preparation  Patient position: sitting  Prep: Betadine  Patient monitoring: ECG, Pulse Ox, continuous capnometry and Blood Pressure  Reason for block: primary anesthetic   Epidural  Skin Anesthetic: lidocaine 1%  Administration type: continuous  Approach: midline  Interspace: L3-4    Injection technique: SOHAM saline  Needle and Epidural Catheter  Needle type: Tuohy   Needle gauge: 18  Needle length: 3.5 inches  Catheter type: end hole  Insertion Attempts: 1  Test dose: 3 mL of lidocaine 1.5% with Epi 1-to-200,000  Additional Documentation: incremental injection  Needle localization: anatomical landmarks  Assessment  Ease of block: easy  Patient's tolerance of the procedure: comfortable throughout block No inadvertent dural puncture with Tuohy.  Dural puncture not performed with spinal needle    Medications:    Medications: ROPIvacaine (NAROPIN) injection 0.75% - Epidural   5 mL - 12/10/2022 1:11:00 AM

## 2022-12-10 NOTE — H&P
Chief Complain - SROM    HPI:  [age] year old G 7 P  at 37 and 6/7  WGA presents with complaint ofSROM at 9 pm.    ROS:  + fetal movement; + leakage of fluid; no vaginal bleeding; + contractions  All other review of systems negative      Past medical history-anxiety, pre term labor with 2nd pregnancy    Past surgical history-tympanoplasty x4; tonsils and adenoids; diagnostic laparoscopy/hysteroscopy/D&C x1 (menorrhagia); D and C x4 (post D&C on 1 postpartum hemorrhage)    Past OB history- x2 (both term); SAB x4    Past gyn history-no recent STDs or abnormal Paps    Medications-prenatal vitamins; Zoloft    Social history-no alcohol or drugs; +vaping    Allergies-Macrobid    Family history-mother with history of stroke        Vital signs are stable and the patient is afebrile  Fetal heart tones - 130s and reactive/category 1; tocometer - irregular contractions        Physical exam:    General-alert and oriented x3 no acute distress  Chest-clear to auscultation bilaterally  Heart-regular rate and rhythm  HEENT-PERRL; EOMI  Abdomen-soft, nontender, gravid uterus with no fundal tenderness  -no vulvar vaginal lesions; cervix-5/70/-2; no vaginal bleeding; +leaking of clear fluid  Extremities-mild lower extremity edema      Laboratories:    GBBS negative    Radiology:    None    Assessment and plan)  1-intrauterine pregnancy at 37 and 6/7 weeks gestational age  2-SROM/active labor-will admit for delivery

## 2022-12-11 PROBLEM — Z3A.38 38 WEEKS GESTATION OF PREGNANCY: Status: ACTIVE | Noted: 2022-12-11

## 2022-12-11 LAB
BASOPHILS # BLD AUTO: 0.03 K/UL (ref 0–0.2)
BASOPHILS NFR BLD AUTO: 0.3 % (ref 0–1)
DIFFERENTIAL METHOD BLD: ABNORMAL
EOSINOPHIL # BLD AUTO: 0.05 K/UL (ref 0–0.5)
EOSINOPHIL NFR BLD AUTO: 0.6 % (ref 1–4)
ERYTHROCYTE [DISTWIDTH] IN BLOOD BY AUTOMATED COUNT: 14.2 % (ref 11.5–14.5)
HCT VFR BLD AUTO: 30.1 % (ref 38–47)
HGB BLD-MCNC: 9.8 G/DL (ref 12–16)
IMM GRANULOCYTES # BLD AUTO: 0.09 K/UL (ref 0–0.04)
IMM GRANULOCYTES NFR BLD: 1 % (ref 0–0.4)
LYMPHOCYTES # BLD AUTO: 2.24 K/UL (ref 1–4.8)
LYMPHOCYTES NFR BLD AUTO: 25.1 % (ref 27–41)
MCH RBC QN AUTO: 28.7 PG (ref 27–31)
MCHC RBC AUTO-ENTMCNC: 32.6 G/DL (ref 32–36)
MCV RBC AUTO: 88 FL (ref 80–96)
MONOCYTES # BLD AUTO: 0.6 K/UL (ref 0–0.8)
MONOCYTES NFR BLD AUTO: 6.7 % (ref 2–6)
MPC BLD CALC-MCNC: 10.5 FL (ref 9.4–12.4)
NEUTROPHILS # BLD AUTO: 5.91 K/UL (ref 1.8–7.7)
NEUTROPHILS NFR BLD AUTO: 66.3 % (ref 53–65)
NRBC # BLD AUTO: 0 X10E3/UL
NRBC, AUTO (.00): 0 %
PLATELET # BLD AUTO: 211 K/UL (ref 150–400)
RBC # BLD AUTO: 3.42 M/UL (ref 4.2–5.4)
WBC # BLD AUTO: 8.92 K/UL (ref 4.5–11)

## 2022-12-11 PROCEDURE — 85025 COMPLETE CBC W/AUTO DIFF WBC: CPT | Performed by: OBSTETRICS & GYNECOLOGY

## 2022-12-11 PROCEDURE — 36415 COLL VENOUS BLD VENIPUNCTURE: CPT | Performed by: OBSTETRICS & GYNECOLOGY

## 2022-12-11 PROCEDURE — 25000003 PHARM REV CODE 250: Performed by: OBSTETRICS & GYNECOLOGY

## 2022-12-11 PROCEDURE — 11000001 HC ACUTE MED/SURG PRIVATE ROOM

## 2022-12-11 RX ORDER — IBUPROFEN 800 MG/1
800 TABLET ORAL EVERY 8 HOURS PRN
Qty: 300 TABLET | Refills: 0 | Status: ON HOLD | OUTPATIENT
Start: 2022-12-11 | End: 2023-02-28 | Stop reason: HOSPADM

## 2022-12-11 RX ORDER — FERROUS SULFATE 325(65) MG
325 TABLET, DELAYED RELEASE (ENTERIC COATED) ORAL
Qty: 90 TABLET | Refills: 1 | Status: ON HOLD | OUTPATIENT
Start: 2022-12-11 | End: 2023-02-28

## 2022-12-11 RX ADMIN — DOCUSATE SODIUM 200 MG: 100 CAPSULE, LIQUID FILLED ORAL at 12:12

## 2022-12-11 RX ADMIN — SIMETHICONE 80 MG: 80 TABLET, CHEWABLE ORAL at 12:12

## 2022-12-11 RX ADMIN — DOCUSATE SODIUM 200 MG: 100 CAPSULE, LIQUID FILLED ORAL at 07:12

## 2022-12-11 RX ADMIN — HYDROCORTISONE: 25 CREAM TOPICAL at 07:12

## 2022-12-11 RX ADMIN — Medication 1 TABLET: at 09:12

## 2022-12-11 RX ADMIN — IBUPROFEN 800 MG: 800 TABLET, FILM COATED ORAL at 11:12

## 2022-12-11 NOTE — PLAN OF CARE
Problem: Adult Inpatient Plan of Care  Goal: Plan of Care Review  2022 1044 by Marta Bower, RN  Outcome: Met  2022 0839 by Marta Bower, RN  Outcome: Ongoing, Progressing  Goal: Patient-Specific Goal (Individualized)  2022 1044 by Marta Bower, RN  Outcome: Met  2022 0839 by Marta Bower, RN  Outcome: Ongoing, Progressing  Goal: Absence of Hospital-Acquired Illness or Injury  2022 1044 by Marta Bower, RN  Outcome: Met  2022 0839 by Marta Bower, RN  Outcome: Ongoing, Progressing  Goal: Optimal Comfort and Wellbeing  2022 1044 by Marta Bower, RN  Outcome: Met  2022 0839 by Marta Bower, RN  Outcome: Ongoing, Progressing  Goal: Readiness for Transition of Care  2022 1044 by Marta Bower, RN  Outcome: Met  2022 0839 by Marta Bower, RN  Outcome: Ongoing, Progressing     Problem:  Fall Injury Risk  Goal: Absence of Fall, Infant Drop and Related Injury  2022 1044 by Marta Bower, RN  Outcome: Met  2022 0839 by Marta Bower, RN  Outcome: Ongoing, Progressing     Problem: Infection  Goal: Absence of Infection Signs and Symptoms  2022 1044 by Marta Bower, RN  Outcome: Met  2022 0839 by Marta Bower, RN  Outcome: Ongoing, Progressing     Problem: Adjustment to Role Transition (Postpartum Vaginal Delivery)  Goal: Successful Maternal Role Transition  2022 1044 by Marta Bower, RN  Outcome: Met  2022 0839 by Marta Bower, RN  Outcome: Ongoing, Progressing     Problem: Bleeding (Postpartum Vaginal Delivery)  Goal: Hemostasis  2022 1044 by Marta Bower, RN  Outcome: Met  2022 0839 by Marta Bower, RN  Outcome: Ongoing, Progressing     Problem: Infection (Postpartum Vaginal Delivery)  Goal: Absence of Infection Signs/Symptoms  2022 1044 by Marta Bower, RN  Outcome: Met  2022 0839 by Marta Bower, RN  Outcome: Ongoing,  Progressing     Problem: Pain (Postpartum Vaginal Delivery)  Goal: Acceptable Pain Control  12/11/2022 1044 by Marta Bower RN  Outcome: Met  12/11/2022 0839 by Marta Bower RN  Outcome: Ongoing, Progressing     Problem: Urinary Retention (Postpartum Vaginal Delivery)  Goal: Effective Urinary Elimination  12/11/2022 1044 by Marta Bower RN  Outcome: Met  12/11/2022 0839 by Marta Bower RN  Outcome: Ongoing, Progressing

## 2022-12-12 VITALS
BODY MASS INDEX: 32.43 KG/M2 | HEART RATE: 63 BPM | SYSTOLIC BLOOD PRESSURE: 112 MMHG | TEMPERATURE: 97 F | HEIGHT: 63 IN | OXYGEN SATURATION: 97 % | RESPIRATION RATE: 20 BRPM | WEIGHT: 183 LBS | DIASTOLIC BLOOD PRESSURE: 69 MMHG

## 2022-12-12 PROCEDURE — 25000003 PHARM REV CODE 250: Performed by: OBSTETRICS & GYNECOLOGY

## 2022-12-12 RX ORDER — SENNOSIDES 8.6 MG/1
1 TABLET ORAL DAILY
Qty: 30 TABLET | Refills: 2 | Status: ON HOLD | OUTPATIENT
Start: 2022-12-12 | End: 2023-02-28 | Stop reason: HOSPADM

## 2022-12-12 RX ADMIN — Medication: at 07:12

## 2022-12-12 RX ADMIN — Medication 1 TABLET: at 08:12

## 2022-12-12 NOTE — LACTATION NOTE
This note was copied from a baby's chart.  Breastfeeding rounds done, mom reports infant latching well, mom denies questions or concerns

## 2022-12-20 ENCOUNTER — PATIENT MESSAGE (OUTPATIENT)
Dept: OBSTETRICS AND GYNECOLOGY | Facility: CLINIC | Age: 27
End: 2022-12-20
Payer: MEDICAID

## 2022-12-20 ENCOUNTER — TELEPHONE (OUTPATIENT)
Dept: OBSTETRICS AND GYNECOLOGY | Facility: CLINIC | Age: 27
End: 2022-12-20
Payer: MEDICAID

## 2022-12-20 NOTE — TELEPHONE ENCOUNTER
----- Message from Kaylee Adkins sent at 12/20/2022 10:42 AM CST -----  Patient needs/ has questions or complaints about a Circumcision appointment.    Please call patient back at [phone]

## 2022-12-20 NOTE — DISCHARGE SUMMARY
Discharge summary      Date of admit-2022    Date of discharge-2022    Admit diagnosis-intrauterine pregnancy at term, active labor    Discharge diagnosis-same    Procedures performed-    History and physical-see admit H&P    Hospital course-patient is a 27-year-old  042 who presented at 37 and 6/7 weeks gestational age in active labor.  Patient underwent spontaneous vaginal delivery on December 10, 2022 without complication was sent to the postpartum noguera for routine postpartum care.  Patient had an unremarkable postpartum course by postpartum day 2. Was ambulating, tolerating regular diet, voiding without difficulty and passing flatus and her vital signs were stable and she was afebrile and her lochia was minimal and was decided the patient was stable for discharge to home.    Patient was discharged on a regular diet  Patient was discharged on following medications-Motrin 800 mg p.o. q.8 hours p.r.n.; prenatal vitamins with iron 1 p.o. q.day  Patient was to follow-up with her primary OB in 4-6 weeks for a postpartum visit  Patient was to observe pelvic rest x6 weeks  Patient was to return to the ER for any acute problems

## 2022-12-22 ENCOUNTER — POSTPARTUM VISIT (OUTPATIENT)
Dept: OBSTETRICS AND GYNECOLOGY | Facility: CLINIC | Age: 27
End: 2022-12-22
Payer: MEDICAID

## 2022-12-22 VITALS
WEIGHT: 160.38 LBS | BODY MASS INDEX: 28.42 KG/M2 | SYSTOLIC BLOOD PRESSURE: 110 MMHG | RESPIRATION RATE: 16 BRPM | HEIGHT: 63 IN | DIASTOLIC BLOOD PRESSURE: 80 MMHG

## 2022-12-22 PROCEDURE — 99212 OFFICE O/P EST SF 10 MIN: CPT | Mod: S$PBB,,, | Performed by: STUDENT IN AN ORGANIZED HEALTH CARE EDUCATION/TRAINING PROGRAM

## 2022-12-22 PROCEDURE — 99213 OFFICE O/P EST LOW 20 MIN: CPT | Mod: PBBFAC | Performed by: STUDENT IN AN ORGANIZED HEALTH CARE EDUCATION/TRAINING PROGRAM

## 2022-12-22 PROCEDURE — 99212 PR OFFICE/OUTPT VISIT, EST, LEVL II, 10-19 MIN: ICD-10-PCS | Mod: S$PBB,,, | Performed by: STUDENT IN AN ORGANIZED HEALTH CARE EDUCATION/TRAINING PROGRAM

## 2022-12-22 NOTE — PROGRESS NOTES
Subjective:      Rebekah Gregg is a 27 y.o. female who presents for a postpartum visit. She is 2 weeks postpartum.  Outcome: spontaneous vaginal delivery. Anesthesia: epidural. Postpartum course has been uncomplicated. Baby's course has been uncomplicated. Baby is feeding by breast. Bleeding  light . Bowel function is normal. Bladder function is normal. Patient is not sexually active. Contraception method is abstinence. Postpartum depression screening: negative.    The following portions of the patient's history were reviewed and updated as appropriate: allergies, current medications, past family history, past medical history, past social history, past surgical history, and problem list.    Review of Systems  Pertinent items are noted in HPI.     Objective:      /80   Wt 82.6 kg (182 lb)   LMP 03/15/2022 (Approximate)   BMI 35.54 kg/m²    General:  alert, appears stated age, and cooperative    Breasts:  deferred   Lungs: Unlabored   Abdomen: Soft, nontender          Assessment:      Normal postpartum exam.     Plan:      1. Contraception: abstinence, tubal consent signed  2. Follow up in: 3 weeks or as needed.

## 2023-01-12 ENCOUNTER — POSTPARTUM VISIT (OUTPATIENT)
Dept: OBSTETRICS AND GYNECOLOGY | Facility: CLINIC | Age: 28
End: 2023-01-12
Payer: MEDICAID

## 2023-01-12 VITALS
BODY MASS INDEX: 30.16 KG/M2 | DIASTOLIC BLOOD PRESSURE: 70 MMHG | HEIGHT: 63 IN | SYSTOLIC BLOOD PRESSURE: 110 MMHG | RESPIRATION RATE: 16 BRPM | WEIGHT: 170.19 LBS

## 2023-01-12 PROCEDURE — 99213 OFFICE O/P EST LOW 20 MIN: CPT | Mod: PBBFAC | Performed by: STUDENT IN AN ORGANIZED HEALTH CARE EDUCATION/TRAINING PROGRAM

## 2023-01-12 PROCEDURE — 99212 OFFICE O/P EST SF 10 MIN: CPT | Mod: S$PBB,,, | Performed by: STUDENT IN AN ORGANIZED HEALTH CARE EDUCATION/TRAINING PROGRAM

## 2023-01-12 PROCEDURE — 99212 PR OFFICE/OUTPT VISIT, EST, LEVL II, 10-19 MIN: ICD-10-PCS | Mod: S$PBB,,, | Performed by: STUDENT IN AN ORGANIZED HEALTH CARE EDUCATION/TRAINING PROGRAM

## 2023-01-12 RX ORDER — BUPROPION HYDROCHLORIDE 150 MG/1
150 TABLET ORAL EVERY MORNING
Qty: 30 TABLET | Refills: 2 | Status: ON HOLD | OUTPATIENT
Start: 2023-01-12 | End: 2023-02-28

## 2023-01-12 NOTE — PROGRESS NOTES
Subjective:      Rebekah Gregg is a 27 y.o. female who presents for a postpartum visit. She is 4 weeks postpartum.  Outcome: spontaneous vaginal delivery. Anesthesia: spinal. Postpartum course has been uncomplicated. Baby's course has been uncomplicated. Baby is feeding by breast. Bleeding  light . Bowel function is normal. Bladder function is normal. Patient is not sexually active. Contraception method is abstinence. Postpartum depression screening: negative, but thinks she needs more than just Zoloft 100 mg.    The following portions of the patient's history were reviewed and updated as appropriate: allergies, current medications, past family history, past medical history, past social history, past surgical history, and problem list.    Review of Systems  Pertinent items are noted in HPI.     Objective:      /80   Wt 82.6 kg (182 lb)   LMP 03/15/2022 (Approximate)   BMI 35.54 kg/m²    General:  alert, appears stated age, and cooperative    Breasts:  deferred   Lungs: unlabored   Abdomen: Soft, nontender          Assessment:      Normal postpartum exam.     Plan:      1. Contraception: abstinence  2. Rx for Wellbutrin 150 mg sent to pharmacy to add to Zoloft 100 mg daily  3. Follow up in: 3 weeks or as needed.    4. Schedule tubal sterilization

## 2023-01-31 RX ORDER — SERTRALINE HYDROCHLORIDE 100 MG/1
100 TABLET, FILM COATED ORAL DAILY
Qty: 30 TABLET | Refills: 2 | Status: CANCELLED | OUTPATIENT
Start: 2023-01-31 | End: 2024-01-31

## 2023-02-06 ENCOUNTER — TELEPHONE (OUTPATIENT)
Dept: OBSTETRICS AND GYNECOLOGY | Facility: CLINIC | Age: 28
End: 2023-02-06
Payer: MEDICAID

## 2023-02-06 PROBLEM — O23.43 URINARY TRACT INFECTION AFFECTING CARE OF MOTHER, ANTEPARTUM, THIRD TRIMESTER: Status: RESOLVED | Noted: 2022-08-18 | Resolved: 2023-02-06

## 2023-02-06 NOTE — TELEPHONE ENCOUNTER
----- Message from Salena Ball sent at 2/2/2023  3:33 PM CST -----  [phone]  Pt is having surgery on the 28th of this month, she wants to know if the anesthesia will effect her breast feeding and baby.

## 2023-02-20 DIAGNOSIS — Z30.2 REQUEST FOR STERILIZATION: Primary | ICD-10-CM

## 2023-02-27 ENCOUNTER — CLINICAL SUPPORT (OUTPATIENT)
Dept: OBSTETRICS AND GYNECOLOGY | Facility: CLINIC | Age: 28
End: 2023-02-27
Payer: MEDICAID

## 2023-02-27 DIAGNOSIS — Z01.818 PRE-OP EXAM: Primary | ICD-10-CM

## 2023-02-27 PROCEDURE — 99213 OFFICE O/P EST LOW 20 MIN: CPT | Mod: PBBFAC

## 2023-02-27 RX ORDER — SERTRALINE HYDROCHLORIDE 150 MG/1
CAPSULE ORAL
COMMUNITY
End: 2023-10-30

## 2023-02-28 ENCOUNTER — HOSPITAL ENCOUNTER (OUTPATIENT)
Facility: HOSPITAL | Age: 28
Discharge: HOME | End: 2023-02-28
Attending: STUDENT IN AN ORGANIZED HEALTH CARE EDUCATION/TRAINING PROGRAM | Admitting: STUDENT IN AN ORGANIZED HEALTH CARE EDUCATION/TRAINING PROGRAM
Payer: MEDICAID

## 2023-02-28 ENCOUNTER — ANESTHESIA (OUTPATIENT)
Dept: SURGERY | Facility: HOSPITAL | Age: 28
End: 2023-02-28
Payer: MEDICAID

## 2023-02-28 ENCOUNTER — ANESTHESIA EVENT (OUTPATIENT)
Dept: SURGERY | Facility: HOSPITAL | Age: 28
End: 2023-02-28
Payer: MEDICAID

## 2023-02-28 VITALS
DIASTOLIC BLOOD PRESSURE: 74 MMHG | SYSTOLIC BLOOD PRESSURE: 120 MMHG | HEART RATE: 78 BPM | WEIGHT: 184 LBS | HEIGHT: 64 IN | OXYGEN SATURATION: 95 % | TEMPERATURE: 99 F | RESPIRATION RATE: 16 BRPM | BODY MASS INDEX: 31.41 KG/M2

## 2023-02-28 DIAGNOSIS — Z30.2 REQUEST FOR STERILIZATION: ICD-10-CM

## 2023-02-28 LAB — HCG UR QL IA.RAPID: NEGATIVE

## 2023-02-28 PROCEDURE — 58661 PR LAP,RMV  ADNEXAL STRUCTURE: ICD-10-PCS | Mod: 50,,, | Performed by: STUDENT IN AN ORGANIZED HEALTH CARE EDUCATION/TRAINING PROGRAM

## 2023-02-28 PROCEDURE — D9220A PRA ANESTHESIA: Mod: ANES,,, | Performed by: ANESTHESIOLOGY

## 2023-02-28 PROCEDURE — 71000016 HC POSTOP RECOV ADDL HR: Performed by: STUDENT IN AN ORGANIZED HEALTH CARE EDUCATION/TRAINING PROGRAM

## 2023-02-28 PROCEDURE — 88304 SURGICAL PATHOLOGY: ICD-10-PCS | Mod: 26,,, | Performed by: PATHOLOGY

## 2023-02-28 PROCEDURE — 25000003 PHARM REV CODE 250: Performed by: NURSE ANESTHETIST, CERTIFIED REGISTERED

## 2023-02-28 PROCEDURE — 58120 PR DILATION/CURETTAGE,DIAGNOSTIC: ICD-10-PCS | Mod: 51,,, | Performed by: STUDENT IN AN ORGANIZED HEALTH CARE EDUCATION/TRAINING PROGRAM

## 2023-02-28 PROCEDURE — 71000033 HC RECOVERY, INTIAL HOUR: Performed by: STUDENT IN AN ORGANIZED HEALTH CARE EDUCATION/TRAINING PROGRAM

## 2023-02-28 PROCEDURE — 88302 TISSUE EXAM BY PATHOLOGIST: CPT | Mod: 26,,, | Performed by: PATHOLOGY

## 2023-02-28 PROCEDURE — D9220A PRA ANESTHESIA: ICD-10-PCS | Mod: ANES,,, | Performed by: ANESTHESIOLOGY

## 2023-02-28 PROCEDURE — 37000008 HC ANESTHESIA 1ST 15 MINUTES: Performed by: STUDENT IN AN ORGANIZED HEALTH CARE EDUCATION/TRAINING PROGRAM

## 2023-02-28 PROCEDURE — 88305 TISSUE EXAM BY PATHOLOGIST: CPT | Mod: TC,SUR | Performed by: STUDENT IN AN ORGANIZED HEALTH CARE EDUCATION/TRAINING PROGRAM

## 2023-02-28 PROCEDURE — 71000015 HC POSTOP RECOV 1ST HR: Performed by: STUDENT IN AN ORGANIZED HEALTH CARE EDUCATION/TRAINING PROGRAM

## 2023-02-28 PROCEDURE — 88305 SURGICAL PATHOLOGY: ICD-10-PCS | Mod: 26,,, | Performed by: PATHOLOGY

## 2023-02-28 PROCEDURE — 37000009 HC ANESTHESIA EA ADD 15 MINS: Performed by: STUDENT IN AN ORGANIZED HEALTH CARE EDUCATION/TRAINING PROGRAM

## 2023-02-28 PROCEDURE — 27000509 HC TUBE SALEM SUMP ANY SIZE: Performed by: ANESTHESIOLOGY

## 2023-02-28 PROCEDURE — 00840 ANES IPER PX LOWER ABD NOS: CPT | Performed by: STUDENT IN AN ORGANIZED HEALTH CARE EDUCATION/TRAINING PROGRAM

## 2023-02-28 PROCEDURE — D9220A PRA ANESTHESIA: Mod: CRNA,,, | Performed by: NURSE ANESTHETIST, CERTIFIED REGISTERED

## 2023-02-28 PROCEDURE — 27000716 HC OXISENSOR PROBE, ANY SIZE: Performed by: ANESTHESIOLOGY

## 2023-02-28 PROCEDURE — 27000165 HC TUBE, ETT CUFFED: Performed by: ANESTHESIOLOGY

## 2023-02-28 PROCEDURE — 88305 TISSUE EXAM BY PATHOLOGIST: CPT | Mod: 26,,, | Performed by: PATHOLOGY

## 2023-02-28 PROCEDURE — C1765 ADHESION BARRIER: HCPCS | Performed by: STUDENT IN AN ORGANIZED HEALTH CARE EDUCATION/TRAINING PROGRAM

## 2023-02-28 PROCEDURE — 27000689 HC BLADE LARYNGOSCOPE ANY SIZE: Performed by: ANESTHESIOLOGY

## 2023-02-28 PROCEDURE — 88304 TISSUE EXAM BY PATHOLOGIST: CPT | Mod: 26,,, | Performed by: PATHOLOGY

## 2023-02-28 PROCEDURE — 63600175 PHARM REV CODE 636 W HCPCS: Performed by: NURSE ANESTHETIST, CERTIFIED REGISTERED

## 2023-02-28 PROCEDURE — 27201423 OPTIME MED/SURG SUP & DEVICES STERILE SUPPLY: Performed by: STUDENT IN AN ORGANIZED HEALTH CARE EDUCATION/TRAINING PROGRAM

## 2023-02-28 PROCEDURE — 36000709 HC OR TIME LEV III EA ADD 15 MIN: Performed by: STUDENT IN AN ORGANIZED HEALTH CARE EDUCATION/TRAINING PROGRAM

## 2023-02-28 PROCEDURE — 58120 DILATION AND CURETTAGE: CPT | Mod: 51,,, | Performed by: STUDENT IN AN ORGANIZED HEALTH CARE EDUCATION/TRAINING PROGRAM

## 2023-02-28 PROCEDURE — 27000655: Performed by: ANESTHESIOLOGY

## 2023-02-28 PROCEDURE — 36000708 HC OR TIME LEV III 1ST 15 MIN: Performed by: STUDENT IN AN ORGANIZED HEALTH CARE EDUCATION/TRAINING PROGRAM

## 2023-02-28 PROCEDURE — 88302 SURGICAL PATHOLOGY: ICD-10-PCS | Mod: 26,,, | Performed by: PATHOLOGY

## 2023-02-28 PROCEDURE — 58661 LAPAROSCOPY REMOVE ADNEXA: CPT | Mod: 50,,, | Performed by: STUDENT IN AN ORGANIZED HEALTH CARE EDUCATION/TRAINING PROGRAM

## 2023-02-28 PROCEDURE — 81025 URINE PREGNANCY TEST: CPT | Performed by: STUDENT IN AN ORGANIZED HEALTH CARE EDUCATION/TRAINING PROGRAM

## 2023-02-28 PROCEDURE — 27000510 HC BLANKET BAIR HUGGER ANY SIZE: Performed by: ANESTHESIOLOGY

## 2023-02-28 PROCEDURE — D9220A PRA ANESTHESIA: ICD-10-PCS | Mod: CRNA,,, | Performed by: NURSE ANESTHETIST, CERTIFIED REGISTERED

## 2023-02-28 DEVICE — BARRIER INTERCEED 3X4 ST DIS: Type: IMPLANTABLE DEVICE | Site: ABDOMEN | Status: FUNCTIONAL

## 2023-02-28 RX ORDER — SODIUM CHLORIDE, SODIUM LACTATE, POTASSIUM CHLORIDE, CALCIUM CHLORIDE 600; 310; 30; 20 MG/100ML; MG/100ML; MG/100ML; MG/100ML
INJECTION, SOLUTION INTRAVENOUS CONTINUOUS PRN
Status: DISCONTINUED | OUTPATIENT
Start: 2023-02-28 | End: 2023-02-28

## 2023-02-28 RX ORDER — PROPOFOL 10 MG/ML
VIAL (ML) INTRAVENOUS
Status: DISCONTINUED | OUTPATIENT
Start: 2023-02-28 | End: 2023-02-28

## 2023-02-28 RX ORDER — PROCHLORPERAZINE EDISYLATE 5 MG/ML
5 INJECTION INTRAMUSCULAR; INTRAVENOUS EVERY 6 HOURS PRN
Status: DISCONTINUED | OUTPATIENT
Start: 2023-02-28 | End: 2023-02-28 | Stop reason: HOSPADM

## 2023-02-28 RX ORDER — DIPHENHYDRAMINE HYDROCHLORIDE 50 MG/ML
25 INJECTION INTRAMUSCULAR; INTRAVENOUS EVERY 4 HOURS PRN
Status: DISCONTINUED | OUTPATIENT
Start: 2023-02-28 | End: 2023-02-28 | Stop reason: HOSPADM

## 2023-02-28 RX ORDER — HYDROMORPHONE HYDROCHLORIDE 2 MG/ML
0.5 INJECTION, SOLUTION INTRAMUSCULAR; INTRAVENOUS; SUBCUTANEOUS EVERY 5 MIN PRN
Status: DISCONTINUED | OUTPATIENT
Start: 2023-02-28 | End: 2023-02-28 | Stop reason: HOSPADM

## 2023-02-28 RX ORDER — ONDANSETRON 4 MG/1
8 TABLET, ORALLY DISINTEGRATING ORAL EVERY 8 HOURS PRN
Status: DISCONTINUED | OUTPATIENT
Start: 2023-02-28 | End: 2023-02-28 | Stop reason: HOSPADM

## 2023-02-28 RX ORDER — SODIUM CHLORIDE 9 MG/ML
INJECTION, SOLUTION INTRAVENOUS CONTINUOUS
Status: DISCONTINUED | OUTPATIENT
Start: 2023-02-28 | End: 2023-02-28 | Stop reason: HOSPADM

## 2023-02-28 RX ORDER — SODIUM CHLORIDE, SODIUM LACTATE, POTASSIUM CHLORIDE, CALCIUM CHLORIDE 600; 310; 30; 20 MG/100ML; MG/100ML; MG/100ML; MG/100ML
125 INJECTION, SOLUTION INTRAVENOUS CONTINUOUS
Status: DISCONTINUED | OUTPATIENT
Start: 2023-02-28 | End: 2023-02-28 | Stop reason: HOSPADM

## 2023-02-28 RX ORDER — MORPHINE SULFATE 10 MG/ML
4 INJECTION INTRAMUSCULAR; INTRAVENOUS; SUBCUTANEOUS EVERY 5 MIN PRN
Status: DISCONTINUED | OUTPATIENT
Start: 2023-02-28 | End: 2023-02-28 | Stop reason: HOSPADM

## 2023-02-28 RX ORDER — HYDROCODONE BITARTRATE AND ACETAMINOPHEN 5; 325 MG/1; MG/1
1 TABLET ORAL EVERY 4 HOURS PRN
Status: DISCONTINUED | OUTPATIENT
Start: 2023-02-28 | End: 2023-02-28 | Stop reason: HOSPADM

## 2023-02-28 RX ORDER — FENTANYL CITRATE 50 UG/ML
INJECTION, SOLUTION INTRAMUSCULAR; INTRAVENOUS
Status: DISCONTINUED | OUTPATIENT
Start: 2023-02-28 | End: 2023-02-28

## 2023-02-28 RX ORDER — LIDOCAINE HYDROCHLORIDE 20 MG/ML
INJECTION, SOLUTION EPIDURAL; INFILTRATION; INTRACAUDAL; PERINEURAL
Status: DISCONTINUED | OUTPATIENT
Start: 2023-02-28 | End: 2023-02-28

## 2023-02-28 RX ORDER — HYDROCODONE BITARTRATE AND ACETAMINOPHEN 5; 325 MG/1; MG/1
1 TABLET ORAL EVERY 6 HOURS PRN
Qty: 10 TABLET | Refills: 0 | Status: ON HOLD | OUTPATIENT
Start: 2023-02-28 | End: 2023-03-28

## 2023-02-28 RX ORDER — ROCURONIUM BROMIDE 10 MG/ML
INJECTION, SOLUTION INTRAVENOUS
Status: DISCONTINUED | OUTPATIENT
Start: 2023-02-28 | End: 2023-02-28

## 2023-02-28 RX ORDER — OXYCODONE HYDROCHLORIDE 5 MG/1
5 TABLET ORAL
Status: DISCONTINUED | OUTPATIENT
Start: 2023-02-28 | End: 2023-02-28 | Stop reason: HOSPADM

## 2023-02-28 RX ORDER — LIDOCAINE HYDROCHLORIDE 10 MG/ML
1 INJECTION, SOLUTION EPIDURAL; INFILTRATION; INTRACAUDAL; PERINEURAL ONCE AS NEEDED
Status: DISCONTINUED | OUTPATIENT
Start: 2023-02-28 | End: 2023-02-28 | Stop reason: HOSPADM

## 2023-02-28 RX ORDER — ONDANSETRON 2 MG/ML
INJECTION INTRAMUSCULAR; INTRAVENOUS
Status: DISCONTINUED | OUTPATIENT
Start: 2023-02-28 | End: 2023-02-28

## 2023-02-28 RX ORDER — DIPHENHYDRAMINE HYDROCHLORIDE 50 MG/ML
25 INJECTION INTRAMUSCULAR; INTRAVENOUS EVERY 6 HOURS PRN
Status: DISCONTINUED | OUTPATIENT
Start: 2023-02-28 | End: 2023-02-28 | Stop reason: HOSPADM

## 2023-02-28 RX ORDER — DEXAMETHASONE SODIUM PHOSPHATE 4 MG/ML
INJECTION, SOLUTION INTRA-ARTICULAR; INTRALESIONAL; INTRAMUSCULAR; INTRAVENOUS; SOFT TISSUE
Status: DISCONTINUED | OUTPATIENT
Start: 2023-02-28 | End: 2023-02-28

## 2023-02-28 RX ORDER — CLINDAMYCIN PHOSPHATE 900 MG/50ML
INJECTION, SOLUTION INTRAVENOUS
Status: DISCONTINUED | OUTPATIENT
Start: 2023-02-28 | End: 2023-02-28

## 2023-02-28 RX ORDER — MIDAZOLAM HYDROCHLORIDE 1 MG/ML
INJECTION INTRAMUSCULAR; INTRAVENOUS
Status: DISCONTINUED | OUTPATIENT
Start: 2023-02-28 | End: 2023-02-28

## 2023-02-28 RX ORDER — DIPHENHYDRAMINE HCL 25 MG
25 CAPSULE ORAL EVERY 4 HOURS PRN
Status: DISCONTINUED | OUTPATIENT
Start: 2023-02-28 | End: 2023-02-28 | Stop reason: HOSPADM

## 2023-02-28 RX ORDER — ONDANSETRON 2 MG/ML
4 INJECTION INTRAMUSCULAR; INTRAVENOUS DAILY PRN
Status: DISCONTINUED | OUTPATIENT
Start: 2023-02-28 | End: 2023-02-28 | Stop reason: HOSPADM

## 2023-02-28 RX ORDER — MEPERIDINE HYDROCHLORIDE 25 MG/ML
25 INJECTION INTRAMUSCULAR; INTRAVENOUS; SUBCUTANEOUS EVERY 10 MIN PRN
Status: DISCONTINUED | OUTPATIENT
Start: 2023-02-28 | End: 2023-02-28 | Stop reason: HOSPADM

## 2023-02-28 RX ADMIN — SODIUM CHLORIDE, POTASSIUM CHLORIDE, SODIUM LACTATE AND CALCIUM CHLORIDE: 600; 310; 30; 20 INJECTION, SOLUTION INTRAVENOUS at 10:02

## 2023-02-28 RX ADMIN — MIDAZOLAM 2 MG: 1 INJECTION INTRAMUSCULAR; INTRAVENOUS at 10:02

## 2023-02-28 RX ADMIN — LIDOCAINE HYDROCHLORIDE 50 MG: 20 INJECTION, SOLUTION INTRAVENOUS at 10:02

## 2023-02-28 RX ADMIN — PROPOFOL 200 MG: 10 INJECTION, EMULSION INTRAVENOUS at 10:02

## 2023-02-28 RX ADMIN — ONDANSETRON 4 MG: 2 INJECTION INTRAMUSCULAR; INTRAVENOUS at 10:02

## 2023-02-28 RX ADMIN — ROCURONIUM BROMIDE 50 MG: 10 INJECTION, SOLUTION INTRAVENOUS at 10:02

## 2023-02-28 RX ADMIN — FENTANYL CITRATE 100 MCG: 50 INJECTION INTRAMUSCULAR; INTRAVENOUS at 10:02

## 2023-02-28 RX ADMIN — CLINDAMYCIN IN 5 PERCENT DEXTROSE 900 MG: 18 INJECTION, SOLUTION INTRAVENOUS at 10:02

## 2023-02-28 RX ADMIN — DEXAMETHASONE SODIUM PHOSPHATE 8 MG: 4 INJECTION, SOLUTION INTRA-ARTICULAR; INTRALESIONAL; INTRAMUSCULAR; INTRAVENOUS; SOFT TISSUE at 10:02

## 2023-02-28 RX ADMIN — SUGAMMADEX 200 MG: 100 INJECTION, SOLUTION INTRAVENOUS at 11:02

## 2023-02-28 NOTE — H&P
[facility]  Obstetrics & Gynecology  History & Physical    Patient Name: Rebekah Suarez  MRN: [id number]  Admission Date: 2023  Primary Care Provider: Primary Doctor No    Subjective:     Chief Complaint/Reason for Admission: Scheduled surgery    History of Present Illness:  Presents for scheduled surgery. All questions answered. Denies changes to medical history, medications, allergies or surgical history.        OB History    Para Term  AB Living   7 3 3 0 4 3   SAB IAB Ectopic Multiple Live Births   4 0 0 0 3      # Outcome Date GA Lbr Jv/2nd Weight Sex Delivery Anes PTL Lv   7 Term 12/10/22 38w0d 07:47 / 00:51 3.487 kg (7 lb 11 oz) M Vag-Spont Spinal N JILLIAN      Name: BERNIE SUAREZ      Apgar1: 8  Apgar5: 9   6 SAB 2021           5 SAB            4 SAB            3 SAB            2 Term            1 Term              Past Medical History:   Diagnosis Date    Mental disorder      Past Surgical History:   Procedure Laterality Date    DILATION AND CURETTAGE OF UTERUS N/A 2022    Procedure: DILATION AND CURETTAGE, UTERUS;  Surgeon: Abdiel Ding DO;  Location: [facility];  Service: OB/GYN;  Laterality: N/A;    HYSTEROSCOPY N/A 2022    Procedure: HYSTEROSCOPY, DIAGNOSTIC LAPAROSCOPY, CHROMOTUBATION;  Surgeon: Abdiel Ding DO;  Location: [facility] OR;  Service: OB/GYN;  Laterality: N/A;    LAPAROSCOPY N/A 2022    Procedure: LAPAROSCOPY;  Surgeon: Abdiel Ding DO;  Location: [facility] OR;  Service: OB/GYN;  Laterality: N/A;    miscarriage      x 4     TONSILLECTOMY      TYMPANOPLASTY      x4        PTA Medications   Medication Sig    buPROPion (WELLBUTRIN XL) 150 MG TB24 tablet Take 1 tablet (150 mg total) by mouth every morning. (Patient not taking: Reported on 2023)    famotidine (PEPCID) 20 MG tablet Take 1 tablet (20 mg total) by mouth 2 (two) times daily. (Patient not taking: Reported on  12/22/2022)    ferrous sulfate 325 (65 FE) MG EC tablet Take 1 tablet (325 mg total) by mouth once daily. (Patient not taking: Reported on 12/22/2022)    ferrous sulfate 325 (65 FE) MG EC tablet Take 1 tablet (325 mg total) by mouth 3 (three) times daily with meals. (Patient not taking: Reported on 2/27/2023)    ibuprofen (ADVIL,MOTRIN) 800 MG tablet Take 1 tablet (800 mg total) by mouth every 8 (eight) hours as needed for Pain. (Patient not taking: Reported on 12/22/2022)    omeprazole (PRILOSEC) 20 MG capsule Take 1 capsule (20 mg total) by mouth once daily. (Patient not taking: Reported on 12/22/2022)    prenatal vit,poli 74/iron/folic (PRENATAL VITAMIN 1+1 ORAL) Prenatal Vitamin    promethazine (PHENERGAN) 25 MG suppository Place 1 suppository (25 mg total) rectally every 6 (six) hours as needed for Nausea. (Patient not taking: Reported on 12/22/2022)    senna (SENNA LAXATIVE) 8.6 mg tablet Take 1 tablet by mouth once daily. (Patient not taking: Reported on 2/27/2023)    sertraline (ZOLOFT) 100 MG tablet Take 1 tablet (100 mg total) by mouth once daily. (Patient not taking: Reported on 2/27/2023)    sertraline 150 mg Cap sertraline 150 mg capsule   Take 1 capsule every day by oral route.       Review of patient's allergies indicates:   Allergen Reactions    Macrobid [nitrofurantoin monohyd/m-cryst] Hives    Penicillins Hives, Itching, Rash and Swelling        Family History       Problem Relation (Age of Onset)    Stroke Mother          Tobacco Use    Smoking status: Every Day     Types: Vaping with nicotine    Smokeless tobacco: Never   Substance and Sexual Activity    Alcohol use: Never    Drug use: Never    Sexual activity: Yes     Partners: Male     Review of Systems   All other systems reviewed and are negative.   Objective:     Vital Signs (Most Recent):    Vital Signs (24h Range):           There is no height or weight on file to calculate BMI.    No LMP recorded.    Physical Exam:    Constitutional: She is oriented to person, place, and time. She appears well-developed and well-nourished.    HENT:   Head: Normocephalic and atraumatic.      Cardiovascular:  Normal rate.             Pulmonary/Chest: Effort normal.        Abdominal: Soft. There is no abdominal tenderness.             Musculoskeletal: Normal range of motion.       Neurological: She is alert and oriented to person, place, and time.    Skin: Skin is warm and dry.    Psychiatric: She has a normal mood and affect. Her behavior is normal. Judgment and thought content normal.     Laboratory:  CBC:   Recent Labs   Lab 02/27/23  1010   WBC 6.90   RBC 4.40   HGB 12.6   HCT 38.9      MCV 88.4   MCH 28.6   MCHC 32.4     Recent Lab Results         02/27/23  1010        Antibody ID POS, Rhogam Antibody       Baso # 0.04       Basophil % 0.6       Differential Type Auto       Eos # 0.27       Eosinophil % 3.9       Group & Rh A NEG       Hematocrit 38.9       Hemoglobin 12.6       Immature Grans (Abs) 0.02       Immature Granulocytes 0.3       INDIRECT DIO POS       Lymph # 2.02       Lymph % 29.3       MCH 28.6       MCHC 32.4       MCV 88.4       Mono # 0.47       Mono % 6.8       MPV 9.7       Neutrophils, Abs 4.08       Neutrophils Relative 59.1       nRBC 0.0       NUCLEATED RBC ABSOLUTE 0.00       Platelets 229       RBC 4.40       RDW 14.2       WBC 6.90             I have personallly reviewed all pertinent lab results from the last 24 hours.    Diagnostic Results:  Labs: Reviewed    Assessment/Plan:     Renal/  Request for sterilization  Desires tubal sterilization  Medicaid papers signed   Proceed with surgery    Abnormal uterine bleeding  History of heavy cycles  Plan for HDC, endometrial ablation at time of tubal ligation        Abdiel Ding DO  Obstetrics & Gynecology  [facility]

## 2023-02-28 NOTE — OR NURSING
1125 REC'D TO PACU IN STABLE COND. PT SLEEPING, WAKES TO VOICE. CONNECTED TO BP CUFF, EKG LEADS & SPO2 MONITORS. VS STABLE  PT HAD A BILATERAL TUBAL LIGATION TODAY. NO DISTRESS NOTED@ THIS TIME, WILL CONT TO MONITOR.       1155 TRANSFERRED TO OUT PT ROOM 1 IN STABLE COND. BEDSIDE REPORT GIVEN TO KAE DENNISON RN.   NO DISTRESS NOTED @ THIS TIME. VS STABLE  96 %  78  113/70

## 2023-02-28 NOTE — SUBJECTIVE & OBJECTIVE
OB History    Para Term  AB Living   7 3 3 0 4 3   SAB IAB Ectopic Multiple Live Births   4 0 0 0 3      # Outcome Date GA Lbr Jv/2nd Weight Sex Delivery Anes PTL Lv   7 Term 12/10/22 38w0d 07:47 / 00:51 3.487 kg (7 lb 11 oz) M Vag-Spont Spinal N JILLIAN      Name: BERNIE SUAREZ      Apgar1: 8  Apgar5: 9   6 SAB 2021           5 SAB            4 SAB            3 SAB            2 Term            1 Term              Past Medical History:   Diagnosis Date    Mental disorder      Past Surgical History:   Procedure Laterality Date    DILATION AND CURETTAGE OF UTERUS N/A 2022    Procedure: DILATION AND CURETTAGE, UTERUS;  Surgeon: Abdiel Ding DO;  Location: [facility];  Service: OB/GYN;  Laterality: N/A;    HYSTEROSCOPY N/A 2022    Procedure: HYSTEROSCOPY, DIAGNOSTIC LAPAROSCOPY, CHROMOTUBATION;  Surgeon: Abdiel Ding DO;  Location: [facility] OR;  Service: OB/GYN;  Laterality: N/A;    LAPAROSCOPY N/A 2022    Procedure: LAPAROSCOPY;  Surgeon: Abdiel Ding DO;  Location: [facility] OR;  Service: OB/GYN;  Laterality: N/A;    miscarriage      x 4     TONSILLECTOMY      TYMPANOPLASTY      x4        PTA Medications   Medication Sig    buPROPion (WELLBUTRIN XL) 150 MG TB24 tablet Take 1 tablet (150 mg total) by mouth every morning. (Patient not taking: Reported on 2023)    famotidine (PEPCID) 20 MG tablet Take 1 tablet (20 mg total) by mouth 2 (two) times daily. (Patient not taking: Reported on 2022)    ferrous sulfate 325 (65 FE) MG EC tablet Take 1 tablet (325 mg total) by mouth once daily. (Patient not taking: Reported on 2022)    ferrous sulfate 325 (65 FE) MG EC tablet Take 1 tablet (325 mg total) by mouth 3 (three) times daily with meals. (Patient not taking: Reported on 2023)    ibuprofen (ADVIL,MOTRIN) 800 MG tablet Take 1 tablet (800 mg total) by mouth every 8 (eight) hours as needed for Pain. (Patient not taking: Reported on  12/22/2022)    omeprazole (PRILOSEC) 20 MG capsule Take 1 capsule (20 mg total) by mouth once daily. (Patient not taking: Reported on 12/22/2022)    prenatal vit,poli 74/iron/folic (PRENATAL VITAMIN 1+1 ORAL) Prenatal Vitamin    promethazine (PHENERGAN) 25 MG suppository Place 1 suppository (25 mg total) rectally every 6 (six) hours as needed for Nausea. (Patient not taking: Reported on 12/22/2022)    senna (SENNA LAXATIVE) 8.6 mg tablet Take 1 tablet by mouth once daily. (Patient not taking: Reported on 2/27/2023)    sertraline (ZOLOFT) 100 MG tablet Take 1 tablet (100 mg total) by mouth once daily. (Patient not taking: Reported on 2/27/2023)    sertraline 150 mg Cap sertraline 150 mg capsule   Take 1 capsule every day by oral route.       Review of patient's allergies indicates:   Allergen Reactions    Macrobid [nitrofurantoin monohyd/m-cryst] Hives    Penicillins Hives, Itching, Rash and Swelling        Family History       Problem Relation (Age of Onset)    Stroke Mother          Tobacco Use    Smoking status: Every Day     Types: Vaping with nicotine    Smokeless tobacco: Never   Substance and Sexual Activity    Alcohol use: Never    Drug use: Never    Sexual activity: Yes     Partners: Male     Review of Systems   All other systems reviewed and are negative.   Objective:     Vital Signs (Most Recent):    Vital Signs (24h Range):           There is no height or weight on file to calculate BMI.    No LMP recorded.    Physical Exam:   Constitutional: She is oriented to person, place, and time. She appears well-developed and well-nourished.    HENT:   Head: Normocephalic and atraumatic.      Cardiovascular:  Normal rate.             Pulmonary/Chest: Effort normal.        Abdominal: Soft. There is no abdominal tenderness.             Musculoskeletal: Normal range of motion.       Neurological: She is alert and oriented to person, place, and time.    Skin: Skin is warm and dry.    Psychiatric: She has a normal mood  and affect. Her behavior is normal. Judgment and thought content normal.     Laboratory:  CBC:   Recent Labs   Lab 02/27/23  1010   WBC 6.90   RBC 4.40   HGB 12.6   HCT 38.9      MCV 88.4   MCH 28.6   MCHC 32.4     Recent Lab Results         02/27/23  1010        Antibody ID POS, Rhogam Antibody       Baso # 0.04       Basophil % 0.6       Differential Type Auto       Eos # 0.27       Eosinophil % 3.9       Group & Rh A NEG       Hematocrit 38.9       Hemoglobin 12.6       Immature Grans (Abs) 0.02       Immature Granulocytes 0.3       INDIRECT DIO POS       Lymph # 2.02       Lymph % 29.3       MCH 28.6       MCHC 32.4       MCV 88.4       Mono # 0.47       Mono % 6.8       MPV 9.7       Neutrophils, Abs 4.08       Neutrophils Relative 59.1       nRBC 0.0       NUCLEATED RBC ABSOLUTE 0.00       Platelets 229       RBC 4.40       RDW 14.2       WBC 6.90             I have personallly reviewed all pertinent lab results from the last 24 hours.    Diagnostic Results:  Labs: Reviewed

## 2023-02-28 NOTE — ANESTHESIA POSTPROCEDURE EVALUATION
Anesthesia Post Evaluation    Patient: Rebekah Gregg    Procedure(s) Performed: Procedure(s) (LRB):  LIGATION, FALLOPIAN TUBE, LAPAROSCOPIC (Bilateral)  DILATION AND CURETTAGE, UTERUS  ABLATION, ENDOMETRIUM    Final Anesthesia Type: general      Patient location during evaluation: PACU  Patient participation: Yes- Able to Participate  Level of consciousness: awake and sedated  Post-procedure vital signs: reviewed and stable  Pain management: adequate  Airway patency: patent    PONV status at discharge: No PONV  Anesthetic complications: no      Cardiovascular status: blood pressure returned to baseline  Respiratory status: unassisted  Hydration status: euvolemic  Follow-up not needed.          Vitals Value Taken Time   /71 02/28/23 1152   Temp 37 °C (98.6 °F) 02/28/23 1130   Pulse 91 02/28/23 1152   Resp 15 02/28/23 1152   SpO2 93 % 02/28/23 1152   Vitals shown include unvalidated device data.      No case tracking events are documented in the log.      Pain/Gaby Score: Gaby Score: 8 (2/28/2023  1:25 AM)

## 2023-02-28 NOTE — HPI
Presents for scheduled surgery. All questions answered. Denies changes to medical history, medications, allergies or surgical history.

## 2023-02-28 NOTE — TRANSFER OF CARE
"Anesthesia Transfer of Care Note    Patient: Rebekah Gregg    Procedure(s) Performed: Procedure(s) (LRB):  LIGATION, FALLOPIAN TUBE, LAPAROSCOPIC (Bilateral)  DILATION AND CURETTAGE, UTERUS  ABLATION, ENDOMETRIUM    Patient location: PACU    Anesthesia Type: general    Transport from OR: Transported from OR on 6-10 L/min O2 by face mask with adequate spontaneous ventilation    Post pain: adequate analgesia    Post assessment: no apparent anesthetic complications    Post vital signs: stable    Level of consciousness: responds to stimulation, awake and sedated    Nausea/Vomiting: no nausea/vomiting    Complications: none    Transfer of care protocol was followed      Last vitals:   Visit Vitals  BP (!) 114/55 (BP Location: Right arm, Patient Position: Lying)   Pulse 85   Temp 37 °C (98.6 °F) (Oral)   Resp 17   Ht 5' 4" (1.626 m)   Wt 83.5 kg (184 lb)   SpO2 98%   Breastfeeding Yes   BMI 31.58 kg/m²     "

## 2023-02-28 NOTE — ANESTHESIA PROCEDURE NOTES
Intubation    Date/Time: 2/28/2023 10:09 AM  Performed by: Carlos Rivera CRNA  Authorized by: Maxwell Dee MD     Intubation:     Induction:  Intravenous    Intubated:  Postinduction    Mask Ventilation:  Easy mask    Attempts:  1    Attempted By:  CRNA    Method of Intubation:  Direct    Blade:  Elian 3    Laryngeal View Grade: Grade I - full view of cords      Difficult Airway Encountered?: No      Complications:  None    Airway Device:  Oral endotracheal tube    Airway Device Size:  7.0    Style/Cuff Inflation:  Cuffed (inflated to minimal occlusive pressure)    Tube secured:  22    Secured at:  The lips    Placement Verified By:  Capnometry and Revisualization with laryngoscopy    Complicating Factors:  None    Findings Post-Intubation:  BS equal bilateral and atraumatic/condition of teeth unchanged

## 2023-02-28 NOTE — ANESTHESIA PREPROCEDURE EVALUATION
02/28/2023  Rebekah Gregg is a 27 y.o., female.      Pre-op Assessment    I have reviewed the Patient Summary Reports.     I have reviewed the Nursing Notes. I have reviewed the NPO Status.   I have reviewed the Medications.     Review of Systems  Anesthesia Hx:  No problems with previous Anesthesia    Social:  Non-Smoker, No Alcohol Use    Hematology/Oncology:  Hematology Normal   Oncology Normal     EENT/Dental:EENT/Dental Normal   Cardiovascular:  Cardiovascular Normal     Pulmonary:  Pulmonary Normal    Renal/:  Renal/ Normal     Hepatic/GI:  Hepatic/GI Normal    Musculoskeletal:  Musculoskeletal Normal    OB/GYN/PEDS:  p partum   Neurological:  Neurology Normal    Endocrine:  Endocrine Normal    Dermatological:  Skin Normal    Psych:  Psychiatric Normal           Physical Exam  General: Well nourished    Airway:  Mallampati: II / II  Mouth Opening: Normal  TM Distance: > 6 cm  Tongue: Normal  Neck ROM: Normal ROM    Chest/Lungs:  Clear to auscultation, Normal Respiratory Rate    Heart:  Rate: Normal  Rhythm: Regular Rhythm        Anesthesia Plan  Type of Anesthesia, risks & benefits discussed:    Anesthesia Type: Gen ETT  Intra-op Monitoring Plan: Standard ASA Monitors  Post Op Pain Control Plan: multimodal analgesia  Induction:  IV  Informed Consent: Informed consent signed with the Patient and all parties understand the risks and agree with anesthesia plan.  All questions answered. Patient consented to blood products? Yes  ASA Score: 2  Day of Surgery Review of History & Physical: H&P Update referred to the surgeon/provider.I have interviewed and examined the patient. I have reviewed the patient's H&P dated: There are no significant changes. H&P completed by Anesthesiologist.    Ready For Surgery From Anesthesia Perspective.     .

## 2023-03-01 NOTE — DISCHARGE SUMMARY
[facility]  Discharge Note  Short Stay    Procedure(s) (LRB):  LIGATION, FALLOPIAN TUBE, LAPAROSCOPIC (Bilateral)  DILATION AND CURETTAGE, UTERUS  ABLATION, ENDOMETRIUM      OUTCOME: Patient tolerated treatment/procedure well without complication and is now ready for discharge.    DISPOSITION: Home or Self Care    FINAL DIAGNOSIS:  Request for sterilization    FOLLOWUP: In clinic    DISCHARGE INSTRUCTIONS:    Discharge Procedure Orders   Diet general     Call MD for:  extreme fatigue     Call MD for:  persistent dizziness or light-headedness     Call MD for:  hives     Call MD for:  redness, tenderness, or signs of infection (pain, swelling, redness, odor or green/yellow discharge around incision site)     Call MD for:  difficulty breathing, headache or visual disturbances     Call MD for:  severe uncontrolled pain     Call MD for:  persistent nausea and vomiting     Call MD for:  temperature >100.4     No dressing needed         Clinical Reference Documents Added to Patient Instructions         Document    DILATION AND CURETTAGE DISCHARGE INSTRUCTIONS (ENGLISH)    FALLOPIAN TUBE REMOVAL DISCHARGE INSTRUCTIONS (ENGLISH)            TIME SPENT ON DISCHARGE: <30  minutes

## 2023-03-02 ENCOUNTER — PATIENT MESSAGE (OUTPATIENT)
Dept: OBSTETRICS AND GYNECOLOGY | Facility: CLINIC | Age: 28
End: 2023-03-02
Payer: MEDICAID

## 2023-03-06 ENCOUNTER — TELEPHONE (OUTPATIENT)
Dept: OBSTETRICS AND GYNECOLOGY | Facility: CLINIC | Age: 28
End: 2023-03-06
Payer: MEDICAID

## 2023-03-06 NOTE — TELEPHONE ENCOUNTER
Call to patient re: in response to MyChart question concerning incision; patient voiced today that symptoms have improved but that there are now tiny ant like type bumps around incision; denied fever/chills; has been using antibacterial soap and triple antibiotic ointment;  advised patient that I will forward information to Dr. Ding's nurse and that she will be contacted if further action is needed.  Patient voiced agreement and understanding.

## 2023-03-15 ENCOUNTER — OFFICE VISIT (OUTPATIENT)
Dept: OBSTETRICS AND GYNECOLOGY | Facility: CLINIC | Age: 28
End: 2023-03-15
Payer: MEDICAID

## 2023-03-15 VITALS
HEART RATE: 74 BPM | BODY MASS INDEX: 32.37 KG/M2 | OXYGEN SATURATION: 98 % | WEIGHT: 189.63 LBS | RESPIRATION RATE: 17 BRPM | TEMPERATURE: 98 F | SYSTOLIC BLOOD PRESSURE: 111 MMHG | DIASTOLIC BLOOD PRESSURE: 63 MMHG | HEIGHT: 64 IN

## 2023-03-15 DIAGNOSIS — Z48.89 ENCOUNTER FOR POST SURGICAL WOUND CHECK: Primary | ICD-10-CM

## 2023-03-15 DIAGNOSIS — R87.810 ASCUS WITH POSITIVE HIGH RISK HPV CERVICAL: ICD-10-CM

## 2023-03-15 DIAGNOSIS — R87.610 ASCUS WITH POSITIVE HIGH RISK HPV CERVICAL: ICD-10-CM

## 2023-03-15 DIAGNOSIS — N93.9 ABNORMAL UTERINE BLEEDING: Primary | ICD-10-CM

## 2023-03-15 PROCEDURE — 1159F MED LIST DOCD IN RCRD: CPT | Mod: CPTII,,, | Performed by: STUDENT IN AN ORGANIZED HEALTH CARE EDUCATION/TRAINING PROGRAM

## 2023-03-15 PROCEDURE — 3074F PR MOST RECENT SYSTOLIC BLOOD PRESSURE < 130 MM HG: ICD-10-PCS | Mod: CPTII,,, | Performed by: STUDENT IN AN ORGANIZED HEALTH CARE EDUCATION/TRAINING PROGRAM

## 2023-03-15 PROCEDURE — 3078F DIAST BP <80 MM HG: CPT | Mod: CPTII,,, | Performed by: STUDENT IN AN ORGANIZED HEALTH CARE EDUCATION/TRAINING PROGRAM

## 2023-03-15 PROCEDURE — 99214 OFFICE O/P EST MOD 30 MIN: CPT | Mod: PBBFAC | Performed by: STUDENT IN AN ORGANIZED HEALTH CARE EDUCATION/TRAINING PROGRAM

## 2023-03-15 PROCEDURE — 1159F PR MEDICATION LIST DOCUMENTED IN MEDICAL RECORD: ICD-10-PCS | Mod: CPTII,,, | Performed by: STUDENT IN AN ORGANIZED HEALTH CARE EDUCATION/TRAINING PROGRAM

## 2023-03-15 PROCEDURE — 99024 PR POST-OP FOLLOW-UP VISIT: ICD-10-PCS | Mod: ,,, | Performed by: STUDENT IN AN ORGANIZED HEALTH CARE EDUCATION/TRAINING PROGRAM

## 2023-03-15 PROCEDURE — 3074F SYST BP LT 130 MM HG: CPT | Mod: CPTII,,, | Performed by: STUDENT IN AN ORGANIZED HEALTH CARE EDUCATION/TRAINING PROGRAM

## 2023-03-15 PROCEDURE — 99024 POSTOP FOLLOW-UP VISIT: CPT | Mod: ,,, | Performed by: STUDENT IN AN ORGANIZED HEALTH CARE EDUCATION/TRAINING PROGRAM

## 2023-03-15 PROCEDURE — 3078F PR MOST RECENT DIASTOLIC BLOOD PRESSURE < 80 MM HG: ICD-10-PCS | Mod: CPTII,,, | Performed by: STUDENT IN AN ORGANIZED HEALTH CARE EDUCATION/TRAINING PROGRAM

## 2023-03-15 PROCEDURE — 3008F BODY MASS INDEX DOCD: CPT | Mod: CPTII,,, | Performed by: STUDENT IN AN ORGANIZED HEALTH CARE EDUCATION/TRAINING PROGRAM

## 2023-03-15 PROCEDURE — 3008F PR BODY MASS INDEX (BMI) DOCUMENTED: ICD-10-PCS | Mod: CPTII,,, | Performed by: STUDENT IN AN ORGANIZED HEALTH CARE EDUCATION/TRAINING PROGRAM

## 2023-03-15 NOTE — PROGRESS NOTES
"Subjective:       Rebekah Gregg is a 27 y.o. female who presents to the clinic 2 weeks status post laparoscopic tubal ligation for requested sterilization. Eating a regular diet without difficulty. Bowel movements are normal. The patient is not having any pain. C/o heavy bleeding and states she wants a hysterectomy.     The following portions of the patient's history were reviewed and updated as appropriate: allergies, current medications, past family history, past medical history, past social history, past surgical history, and problem list.    Review of Systems  Pertinent items are noted in HPI.      Objective:      /63 (BP Location: Left arm, Patient Position: Sitting)   Pulse 74   Temp 97.8 °F (36.6 °C) (Oral)   Resp 17   Ht 5' 4" (1.626 m)   Wt 86 kg (189 lb 9.6 oz)   SpO2 98%   Breastfeeding Yes   BMI 32.54 kg/m²   General:  alert, appears stated age, and cooperative   Abdomen: soft, non-tender   Incisions:   healing well, no drainage, no erythema, no hernia, no seroma, no swelling, no dehiscence, incision well approximated       Assessment:      Doing well postoperatively.  Operative findings again reviewed. Pathology report discussed.      Plan:      1. Continue any current medications.  2. Wound care discussed.  3. Activity restrictions: none  4. Anticipated return to work: now.  5. Follow up: 1 week, Tuesday for Colpo   6. Will schedule hysterectomy after colpo results  "

## 2023-03-21 ENCOUNTER — PROCEDURE VISIT (OUTPATIENT)
Dept: OBSTETRICS AND GYNECOLOGY | Facility: CLINIC | Age: 28
End: 2023-03-21
Payer: MEDICAID

## 2023-03-21 VITALS
DIASTOLIC BLOOD PRESSURE: 71 MMHG | HEIGHT: 64 IN | RESPIRATION RATE: 17 BRPM | TEMPERATURE: 98 F | OXYGEN SATURATION: 98 % | WEIGHT: 193 LBS | BODY MASS INDEX: 32.95 KG/M2 | SYSTOLIC BLOOD PRESSURE: 93 MMHG

## 2023-03-21 DIAGNOSIS — R87.610 ASCUS WITH POSITIVE HIGH RISK HPV CERVICAL: Primary | ICD-10-CM

## 2023-03-21 DIAGNOSIS — R87.810 ASCUS WITH POSITIVE HIGH RISK HPV CERVICAL: Primary | ICD-10-CM

## 2023-03-21 LAB
B-HCG UR QL: NEGATIVE
CTP QC/QA: YES

## 2023-03-21 PROCEDURE — 99499 NO LOS: ICD-10-PCS | Mod: S$PBB,,, | Performed by: STUDENT IN AN ORGANIZED HEALTH CARE EDUCATION/TRAINING PROGRAM

## 2023-03-21 PROCEDURE — 88342 SURGICAL PATHOLOGY: ICD-10-PCS | Mod: 26,,, | Performed by: PATHOLOGY

## 2023-03-21 PROCEDURE — 88305 TISSUE EXAM BY PATHOLOGIST: CPT | Mod: TC,SUR | Performed by: STUDENT IN AN ORGANIZED HEALTH CARE EDUCATION/TRAINING PROGRAM

## 2023-03-21 PROCEDURE — 57454 COLPOSCOPY W/BIOPSY AND ECC- FUTURE: ICD-10-PCS | Mod: S$PBB,,, | Performed by: STUDENT IN AN ORGANIZED HEALTH CARE EDUCATION/TRAINING PROGRAM

## 2023-03-21 PROCEDURE — 88342 IMHCHEM/IMCYTCHM 1ST ANTB: CPT | Mod: 26,,, | Performed by: PATHOLOGY

## 2023-03-21 PROCEDURE — 88305 TISSUE EXAM BY PATHOLOGIST: CPT | Mod: 26,,, | Performed by: PATHOLOGY

## 2023-03-21 PROCEDURE — 99499 UNLISTED E&M SERVICE: CPT | Mod: S$PBB,,, | Performed by: STUDENT IN AN ORGANIZED HEALTH CARE EDUCATION/TRAINING PROGRAM

## 2023-03-21 PROCEDURE — 88305 SURGICAL PATHOLOGY: ICD-10-PCS | Mod: 26,,, | Performed by: PATHOLOGY

## 2023-03-21 PROCEDURE — 57454 BX/CURETT OF CERVIX W/SCOPE: CPT | Mod: PBBFAC | Performed by: STUDENT IN AN ORGANIZED HEALTH CARE EDUCATION/TRAINING PROGRAM

## 2023-03-21 PROCEDURE — 81025 URINE PREGNANCY TEST: CPT | Mod: PBBFAC | Performed by: STUDENT IN AN ORGANIZED HEALTH CARE EDUCATION/TRAINING PROGRAM

## 2023-03-21 NOTE — PROCEDURES
Colposcopy W/BIOPSY AND ECC- Future    Date/Time: 3/21/2023 3:45 PM  Performed by: Abdiel Ding DO  Authorized by: Abdiel Ding, DO     Consent Done?:  Yes (Written)  Timeout:Immediately prior to procedure a time out was called to verify the correct patient, procedure, equipment, support staff and site/side marked as required  Prep:Patient was prepped and draped in the usual sterile fashion  Assistants?: Yes    List of assistants:  ZAINAB Craig MA    Colposcopy Site:  Cervix  Position:  Supine   Patient was prepped and draped in the normal sterile fashion.  Acrowhite Lesion: No    Atypical Vessels: No    Transformation Zone Adequate?: Yes    Biopsy?: Yes         Location:  Cervix ((9 00))  ECC Performed?: Yes    LEEP Performed?: No    Estimated blood loss (cc):  5   Patient tolerated the procedure well with no immediate complications.   Post-operative instructions were provided for the patient.   Patient was discharged and will follow up if any complications occur     Return in 2 weeks for results.

## 2023-03-23 LAB
DHEA SERPL-MCNC: NORMAL
ESTROGEN SERPL-MCNC: NORMAL PG/ML
INSULIN SERPL-ACNC: NORMAL U[IU]/ML
LAB AP CLINICAL INFORMATION: NORMAL
LAB AP GROSS DESCRIPTION: NORMAL
LAB AP LABORATORY NOTES: NORMAL
T3RU NFR SERPL: NORMAL %

## 2023-03-24 RX ORDER — SODIUM CHLORIDE 9 MG/ML
INJECTION, SOLUTION INTRAVENOUS CONTINUOUS
Status: CANCELLED | OUTPATIENT
Start: 2023-03-24

## 2023-03-27 ENCOUNTER — CLINICAL SUPPORT (OUTPATIENT)
Dept: OBSTETRICS AND GYNECOLOGY | Facility: CLINIC | Age: 28
End: 2023-03-27
Payer: MEDICAID

## 2023-03-27 ENCOUNTER — ANESTHESIA EVENT (OUTPATIENT)
Dept: SURGERY | Facility: HOSPITAL | Age: 28
End: 2023-03-27
Payer: MEDICAID

## 2023-03-27 DIAGNOSIS — Z01.818 PRE-OP EXAM: Primary | ICD-10-CM

## 2023-03-27 PROCEDURE — 99212 OFFICE O/P EST SF 10 MIN: CPT | Mod: PBBFAC

## 2023-03-28 ENCOUNTER — HOSPITAL ENCOUNTER (OUTPATIENT)
Facility: HOSPITAL | Age: 28
Discharge: HOME | End: 2023-03-28
Attending: STUDENT IN AN ORGANIZED HEALTH CARE EDUCATION/TRAINING PROGRAM | Admitting: STUDENT IN AN ORGANIZED HEALTH CARE EDUCATION/TRAINING PROGRAM
Payer: MEDICAID

## 2023-03-28 ENCOUNTER — ANESTHESIA (OUTPATIENT)
Dept: SURGERY | Facility: HOSPITAL | Age: 28
End: 2023-03-28
Payer: MEDICAID

## 2023-03-28 VITALS
BODY MASS INDEX: 32.25 KG/M2 | DIASTOLIC BLOOD PRESSURE: 64 MMHG | HEIGHT: 63 IN | RESPIRATION RATE: 16 BRPM | WEIGHT: 182 LBS | HEART RATE: 78 BPM | SYSTOLIC BLOOD PRESSURE: 124 MMHG | TEMPERATURE: 99 F | OXYGEN SATURATION: 93 %

## 2023-03-28 DIAGNOSIS — N93.9 ABNORMAL UTERINE BLEEDING: ICD-10-CM

## 2023-03-28 PROBLEM — O99.611 CONSTIPATION DURING PREGNANCY IN FIRST TRIMESTER: Status: RESOLVED | Noted: 2022-06-06 | Resolved: 2023-03-28

## 2023-03-28 PROBLEM — O26.899 PAIN OF ROUND LIGAMENT COMPLICATING PREGNANCY, ANTEPARTUM: Status: RESOLVED | Noted: 2022-07-31 | Resolved: 2023-03-28

## 2023-03-28 PROBLEM — Z3A.38 38 WEEKS GESTATION OF PREGNANCY: Status: RESOLVED | Noted: 2022-12-11 | Resolved: 2023-03-28

## 2023-03-28 PROBLEM — K59.00 CONSTIPATION DURING PREGNANCY IN FIRST TRIMESTER: Status: RESOLVED | Noted: 2022-06-06 | Resolved: 2023-03-28

## 2023-03-28 PROBLEM — O21.9 NAUSEA AND VOMITING DURING PREGNANCY: Status: RESOLVED | Noted: 2022-09-13 | Resolved: 2023-03-28

## 2023-03-28 PROBLEM — Z3A.19 19 WEEKS GESTATION OF PREGNANCY: Status: RESOLVED | Noted: 2022-07-31 | Resolved: 2023-03-28

## 2023-03-28 PROBLEM — Z3A.35 35 WEEKS GESTATION OF PREGNANCY: Status: RESOLVED | Noted: 2022-11-21 | Resolved: 2023-03-28

## 2023-03-28 PROBLEM — O09.291 HISTORY OF MISCARRIAGE, CURRENTLY PREGNANT, FIRST TRIMESTER: Status: RESOLVED | Noted: 2022-02-02 | Resolved: 2023-03-28

## 2023-03-28 PROBLEM — O47.03 FALSE LABOR BEFORE 37 COMPLETED WEEKS OF GESTATION IN THIRD TRIMESTER: Status: RESOLVED | Noted: 2022-09-22 | Resolved: 2023-03-28

## 2023-03-28 PROBLEM — R10.2 PELVIC PAIN AFFECTING PREGNANCY IN THIRD TRIMESTER, ANTEPARTUM: Status: RESOLVED | Noted: 2022-07-08 | Resolved: 2023-03-28

## 2023-03-28 PROBLEM — Z3A.37 37 WEEKS GESTATION OF PREGNANCY: Status: RESOLVED | Noted: 2022-05-06 | Resolved: 2023-03-28

## 2023-03-28 PROBLEM — O26.893 PELVIC PAIN AFFECTING PREGNANCY IN THIRD TRIMESTER, ANTEPARTUM: Status: RESOLVED | Noted: 2022-07-08 | Resolved: 2023-03-28

## 2023-03-28 PROBLEM — R10.2 PAIN OF ROUND LIGAMENT COMPLICATING PREGNANCY, ANTEPARTUM: Status: RESOLVED | Noted: 2022-07-31 | Resolved: 2023-03-28

## 2023-03-28 PROCEDURE — D9220A PRA ANESTHESIA: ICD-10-PCS | Mod: ANES,,, | Performed by: ANESTHESIOLOGY

## 2023-03-28 PROCEDURE — 37000008 HC ANESTHESIA 1ST 15 MINUTES: Performed by: STUDENT IN AN ORGANIZED HEALTH CARE EDUCATION/TRAINING PROGRAM

## 2023-03-28 PROCEDURE — 64488 TAP BLOCK BI INJECTION: CPT | Mod: 59,,, | Performed by: ANESTHESIOLOGY

## 2023-03-28 PROCEDURE — 36000713 HC OR TIME LEV V EA ADD 15 MIN: Performed by: STUDENT IN AN ORGANIZED HEALTH CARE EDUCATION/TRAINING PROGRAM

## 2023-03-28 PROCEDURE — 27201423 OPTIME MED/SURG SUP & DEVICES STERILE SUPPLY: Performed by: STUDENT IN AN ORGANIZED HEALTH CARE EDUCATION/TRAINING PROGRAM

## 2023-03-28 PROCEDURE — 36000712 HC OR TIME LEV V 1ST 15 MIN: Performed by: STUDENT IN AN ORGANIZED HEALTH CARE EDUCATION/TRAINING PROGRAM

## 2023-03-28 PROCEDURE — D9220A PRA ANESTHESIA: ICD-10-PCS | Mod: CRNA,,,

## 2023-03-28 PROCEDURE — 71000015 HC POSTOP RECOV 1ST HR: Performed by: STUDENT IN AN ORGANIZED HEALTH CARE EDUCATION/TRAINING PROGRAM

## 2023-03-28 PROCEDURE — 64488 PERIPHERAL BLOCK: ICD-10-PCS | Mod: 59,,, | Performed by: ANESTHESIOLOGY

## 2023-03-28 PROCEDURE — 25000003 PHARM REV CODE 250: Performed by: STUDENT IN AN ORGANIZED HEALTH CARE EDUCATION/TRAINING PROGRAM

## 2023-03-28 PROCEDURE — 27202344 HC EYESHIELD

## 2023-03-28 PROCEDURE — 37000009 HC ANESTHESIA EA ADD 15 MINS: Performed by: STUDENT IN AN ORGANIZED HEALTH CARE EDUCATION/TRAINING PROGRAM

## 2023-03-28 PROCEDURE — 63600175 PHARM REV CODE 636 W HCPCS: Performed by: ANESTHESIOLOGY

## 2023-03-28 PROCEDURE — 27000716 HC OXISENSOR PROBE, ANY SIZE

## 2023-03-28 PROCEDURE — 63600175 PHARM REV CODE 636 W HCPCS

## 2023-03-28 PROCEDURE — 88307 TISSUE EXAM BY PATHOLOGIST: CPT | Mod: 26,,, | Performed by: PATHOLOGY

## 2023-03-28 PROCEDURE — 00840 ANES IPER PX LOWER ABD NOS: CPT | Performed by: STUDENT IN AN ORGANIZED HEALTH CARE EDUCATION/TRAINING PROGRAM

## 2023-03-28 PROCEDURE — 27000655

## 2023-03-28 PROCEDURE — C2628 CATHETER, OCCLUSION: HCPCS | Performed by: STUDENT IN AN ORGANIZED HEALTH CARE EDUCATION/TRAINING PROGRAM

## 2023-03-28 PROCEDURE — 58570 TLH UTERUS 250 G OR LESS: CPT | Mod: ,,, | Performed by: STUDENT IN AN ORGANIZED HEALTH CARE EDUCATION/TRAINING PROGRAM

## 2023-03-28 PROCEDURE — 25000003 PHARM REV CODE 250

## 2023-03-28 PROCEDURE — 58570 PR LAPAROSCOPY W TOT HYSTERECT UTERUS 250 GRAM OR LESS: ICD-10-PCS | Mod: ,,, | Performed by: STUDENT IN AN ORGANIZED HEALTH CARE EDUCATION/TRAINING PROGRAM

## 2023-03-28 PROCEDURE — D9220A PRA ANESTHESIA: Mod: CRNA,,,

## 2023-03-28 PROCEDURE — 27000689 HC BLADE LARYNGOSCOPE ANY SIZE

## 2023-03-28 PROCEDURE — 71000016 HC POSTOP RECOV ADDL HR: Performed by: STUDENT IN AN ORGANIZED HEALTH CARE EDUCATION/TRAINING PROGRAM

## 2023-03-28 PROCEDURE — 88307 TISSUE EXAM BY PATHOLOGIST: CPT | Mod: TC,SUR | Performed by: STUDENT IN AN ORGANIZED HEALTH CARE EDUCATION/TRAINING PROGRAM

## 2023-03-28 PROCEDURE — D9220A PRA ANESTHESIA: Mod: ANES,,, | Performed by: ANESTHESIOLOGY

## 2023-03-28 PROCEDURE — 27000165 HC TUBE, ETT CUFFED

## 2023-03-28 PROCEDURE — 88307 SURGICAL PATHOLOGY: ICD-10-PCS | Mod: 26,,, | Performed by: PATHOLOGY

## 2023-03-28 PROCEDURE — 27000509 HC TUBE SALEM SUMP ANY SIZE

## 2023-03-28 PROCEDURE — 71000033 HC RECOVERY, INTIAL HOUR: Performed by: STUDENT IN AN ORGANIZED HEALTH CARE EDUCATION/TRAINING PROGRAM

## 2023-03-28 PROCEDURE — 27200750 HC INSULATED NEEDLE/ STIMUPLEX

## 2023-03-28 RX ORDER — NEOSTIGMINE METHYLSULFATE 1 MG/ML
INJECTION, SOLUTION INTRAVENOUS
Status: DISCONTINUED | OUTPATIENT
Start: 2023-03-28 | End: 2023-03-28

## 2023-03-28 RX ORDER — ROPIVACAINE HYDROCHLORIDE 5 MG/ML
INJECTION, SOLUTION EPIDURAL; INFILTRATION; PERINEURAL
Status: COMPLETED | OUTPATIENT
Start: 2023-03-28 | End: 2023-03-28

## 2023-03-28 RX ORDER — DIPHENHYDRAMINE HYDROCHLORIDE 50 MG/ML
INJECTION INTRAMUSCULAR; INTRAVENOUS
Status: DISCONTINUED | OUTPATIENT
Start: 2023-03-28 | End: 2023-03-28

## 2023-03-28 RX ORDER — GLYCOPYRROLATE 0.2 MG/ML
INJECTION INTRAMUSCULAR; INTRAVENOUS
Status: DISCONTINUED | OUTPATIENT
Start: 2023-03-28 | End: 2023-03-28

## 2023-03-28 RX ORDER — ONDANSETRON 2 MG/ML
4 INJECTION INTRAMUSCULAR; INTRAVENOUS DAILY PRN
Status: DISCONTINUED | OUTPATIENT
Start: 2023-03-28 | End: 2023-03-28 | Stop reason: HOSPADM

## 2023-03-28 RX ORDER — KETOROLAC TROMETHAMINE 30 MG/ML
INJECTION, SOLUTION INTRAMUSCULAR; INTRAVENOUS
Status: DISCONTINUED | OUTPATIENT
Start: 2023-03-28 | End: 2023-03-28

## 2023-03-28 RX ORDER — LIDOCAINE HYDROCHLORIDE 20 MG/ML
INJECTION, SOLUTION EPIDURAL; INFILTRATION; INTRACAUDAL; PERINEURAL
Status: DISCONTINUED | OUTPATIENT
Start: 2023-03-28 | End: 2023-03-28

## 2023-03-28 RX ORDER — OXYCODONE AND ACETAMINOPHEN 5; 325 MG/1; MG/1
1 TABLET ORAL EVERY 4 HOURS PRN
Qty: 10 TABLET | Refills: 0 | Status: SHIPPED | OUTPATIENT
Start: 2023-03-28

## 2023-03-28 RX ORDER — CLINDAMYCIN PHOSPHATE 600 MG/50ML
INJECTION, SOLUTION INTRAVENOUS
Status: DISCONTINUED | OUTPATIENT
Start: 2023-03-28 | End: 2023-03-28

## 2023-03-28 RX ORDER — MIDAZOLAM HYDROCHLORIDE 1 MG/ML
INJECTION INTRAMUSCULAR; INTRAVENOUS
Status: DISCONTINUED | OUTPATIENT
Start: 2023-03-28 | End: 2023-03-28

## 2023-03-28 RX ORDER — DIPHENHYDRAMINE HCL 25 MG
25 CAPSULE ORAL EVERY 4 HOURS PRN
Status: DISCONTINUED | OUTPATIENT
Start: 2023-03-28 | End: 2023-03-28 | Stop reason: HOSPADM

## 2023-03-28 RX ORDER — ONDANSETRON 2 MG/ML
INJECTION INTRAMUSCULAR; INTRAVENOUS
Status: DISCONTINUED | OUTPATIENT
Start: 2023-03-28 | End: 2023-03-28

## 2023-03-28 RX ORDER — FUROSEMIDE 10 MG/ML
INJECTION INTRAMUSCULAR; INTRAVENOUS
Status: DISCONTINUED | OUTPATIENT
Start: 2023-03-28 | End: 2023-03-28

## 2023-03-28 RX ORDER — SODIUM CHLORIDE 9 MG/ML
INJECTION, SOLUTION INTRAVENOUS CONTINUOUS
Status: DISCONTINUED | OUTPATIENT
Start: 2023-03-28 | End: 2023-03-28 | Stop reason: HOSPADM

## 2023-03-28 RX ORDER — DIPHENHYDRAMINE HYDROCHLORIDE 50 MG/ML
25 INJECTION INTRAMUSCULAR; INTRAVENOUS EVERY 6 HOURS PRN
Status: DISCONTINUED | OUTPATIENT
Start: 2023-03-28 | End: 2023-03-28 | Stop reason: HOSPADM

## 2023-03-28 RX ORDER — DEXAMETHASONE SODIUM PHOSPHATE 4 MG/ML
INJECTION, SOLUTION INTRA-ARTICULAR; INTRALESIONAL; INTRAMUSCULAR; INTRAVENOUS; SOFT TISSUE
Status: DISCONTINUED | OUTPATIENT
Start: 2023-03-28 | End: 2023-03-28

## 2023-03-28 RX ORDER — MEPERIDINE HYDROCHLORIDE 25 MG/ML
25 INJECTION INTRAMUSCULAR; INTRAVENOUS; SUBCUTANEOUS ONCE AS NEEDED
Status: DISCONTINUED | OUTPATIENT
Start: 2023-03-28 | End: 2023-03-28 | Stop reason: HOSPADM

## 2023-03-28 RX ORDER — ONDANSETRON 4 MG/1
8 TABLET, ORALLY DISINTEGRATING ORAL EVERY 8 HOURS PRN
Status: DISCONTINUED | OUTPATIENT
Start: 2023-03-28 | End: 2023-03-28 | Stop reason: HOSPADM

## 2023-03-28 RX ORDER — HYDROMORPHONE HYDROCHLORIDE 2 MG/ML
0.5 INJECTION, SOLUTION INTRAMUSCULAR; INTRAVENOUS; SUBCUTANEOUS EVERY 5 MIN PRN
Status: DISCONTINUED | OUTPATIENT
Start: 2023-03-28 | End: 2023-03-28 | Stop reason: HOSPADM

## 2023-03-28 RX ORDER — PROPOFOL 10 MG/ML
VIAL (ML) INTRAVENOUS
Status: DISCONTINUED | OUTPATIENT
Start: 2023-03-28 | End: 2023-03-28

## 2023-03-28 RX ORDER — PROCHLORPERAZINE EDISYLATE 5 MG/ML
5 INJECTION INTRAMUSCULAR; INTRAVENOUS EVERY 6 HOURS PRN
Status: DISCONTINUED | OUTPATIENT
Start: 2023-03-28 | End: 2023-03-28 | Stop reason: HOSPADM

## 2023-03-28 RX ORDER — SIMETHICONE 80 MG
80 TABLET,CHEWABLE ORAL EVERY 4 HOURS PRN
Status: DISCONTINUED | OUTPATIENT
Start: 2023-03-28 | End: 2023-03-28 | Stop reason: HOSPADM

## 2023-03-28 RX ORDER — HYDROMORPHONE HYDROCHLORIDE 2 MG/ML
INJECTION, SOLUTION INTRAMUSCULAR; INTRAVENOUS; SUBCUTANEOUS
Status: DISCONTINUED | OUTPATIENT
Start: 2023-03-28 | End: 2023-03-28

## 2023-03-28 RX ORDER — ROCURONIUM BROMIDE 10 MG/ML
INJECTION, SOLUTION INTRAVENOUS
Status: DISCONTINUED | OUTPATIENT
Start: 2023-03-28 | End: 2023-03-28

## 2023-03-28 RX ORDER — MORPHINE SULFATE 10 MG/ML
4 INJECTION INTRAMUSCULAR; INTRAVENOUS; SUBCUTANEOUS EVERY 5 MIN PRN
Status: DISCONTINUED | OUTPATIENT
Start: 2023-03-28 | End: 2023-03-28 | Stop reason: HOSPADM

## 2023-03-28 RX ORDER — LIDOCAINE HYDROCHLORIDE 40 MG/ML
SOLUTION TOPICAL
Status: DISCONTINUED | OUTPATIENT
Start: 2023-03-28 | End: 2023-03-28

## 2023-03-28 RX ORDER — OXYCODONE AND ACETAMINOPHEN 5; 325 MG/1; MG/1
1 TABLET ORAL ONCE
Status: COMPLETED | OUTPATIENT
Start: 2023-03-28 | End: 2023-03-28

## 2023-03-28 RX ORDER — FENTANYL CITRATE 50 UG/ML
INJECTION, SOLUTION INTRAMUSCULAR; INTRAVENOUS
Status: DISCONTINUED | OUTPATIENT
Start: 2023-03-28 | End: 2023-03-28

## 2023-03-28 RX ADMIN — MIDAZOLAM 2 MG: 1 INJECTION INTRAMUSCULAR; INTRAVENOUS at 08:03

## 2023-03-28 RX ADMIN — ROPIVACAINE HYDROCHLORIDE 30 ML: 5 INJECTION, SOLUTION EPIDURAL; INFILTRATION; PERINEURAL at 09:03

## 2023-03-28 RX ADMIN — HYDROMORPHONE HYDROCHLORIDE 0.5 MG: 2 INJECTION, SOLUTION INTRAMUSCULAR; INTRAVENOUS; SUBCUTANEOUS at 09:03

## 2023-03-28 RX ADMIN — LIDOCAINE HYDROCHLORIDE 120 MG: 40 SOLUTION TOPICAL at 08:03

## 2023-03-28 RX ADMIN — LIDOCAINE HYDROCHLORIDE 40 MG: 20 INJECTION, SOLUTION INTRAVENOUS at 08:03

## 2023-03-28 RX ADMIN — ROCURONIUM BROMIDE 50 MG: 10 INJECTION, SOLUTION INTRAVENOUS at 08:03

## 2023-03-28 RX ADMIN — ONDANSETRON 8 MG: 2 INJECTION INTRAMUSCULAR; INTRAVENOUS at 08:03

## 2023-03-28 RX ADMIN — FENTANYL CITRATE 50 MCG: 50 INJECTION INTRAMUSCULAR; INTRAVENOUS at 09:03

## 2023-03-28 RX ADMIN — FENTANYL CITRATE 100 MCG: 50 INJECTION INTRAMUSCULAR; INTRAVENOUS at 08:03

## 2023-03-28 RX ADMIN — DEXAMETHASONE SODIUM PHOSPHATE 10 MG: 4 INJECTION, SOLUTION INTRA-ARTICULAR; INTRALESIONAL; INTRAMUSCULAR; INTRAVENOUS; SOFT TISSUE at 08:03

## 2023-03-28 RX ADMIN — CLINDAMYCIN PHOSPHATE 600 MG: 600 INJECTION, SOLUTION INTRAVENOUS at 08:03

## 2023-03-28 RX ADMIN — FUROSEMIDE 10 MG: 10 INJECTION, SOLUTION INTRAMUSCULAR; INTRAVENOUS at 09:03

## 2023-03-28 RX ADMIN — DIPHENHYDRAMINE HYDROCHLORIDE 12.5 MG: 50 INJECTION, SOLUTION INTRAMUSCULAR; INTRAVENOUS at 08:03

## 2023-03-28 RX ADMIN — GLYCOPYRROLATE 0.6 MG: 0.2 INJECTION INTRAMUSCULAR; INTRAVENOUS at 09:03

## 2023-03-28 RX ADMIN — KETOROLAC TROMETHAMINE 30 MG: 30 INJECTION, SOLUTION INTRAMUSCULAR; INTRAVENOUS at 09:03

## 2023-03-28 RX ADMIN — NEOSTIGMINE METHYLSULFATE 3 MG: 1 INJECTION INTRAVENOUS at 09:03

## 2023-03-28 RX ADMIN — KETOROLAC TROMETHAMINE 30 MG: 30 INJECTION, SOLUTION INTRAMUSCULAR at 09:03

## 2023-03-28 RX ADMIN — GLYCOPYRROLATE 0.2 MG: 0.2 INJECTION INTRAMUSCULAR; INTRAVENOUS at 08:03

## 2023-03-28 RX ADMIN — PROPOFOL 150 MG: 10 INJECTION, EMULSION INTRAVENOUS at 08:03

## 2023-03-28 RX ADMIN — SODIUM CHLORIDE: 9 INJECTION, SOLUTION INTRAVENOUS at 08:03

## 2023-03-28 RX ADMIN — OXYCODONE AND ACETAMINOPHEN 1 TABLET: 325; 5 TABLET ORAL at 12:03

## 2023-03-28 RX ADMIN — HYDROMORPHONE HYDROCHLORIDE 0.5 MG: 2 INJECTION, SOLUTION INTRAMUSCULAR; INTRAVENOUS; SUBCUTANEOUS at 10:03

## 2023-03-28 RX ADMIN — FENTANYL CITRATE 50 MCG: 50 INJECTION INTRAMUSCULAR; INTRAVENOUS at 08:03

## 2023-03-28 NOTE — OR NURSING
1017 Rec'd pt to PACU asleep with no distress noted, respirations even and unlabored. VSS. Abd lap sites x4 C/D/I, venus pad C/D/I. No needs at this time. Will continue to monitor.     1048 Out of PACU. VSS. No signs of bleeding/distress noted.     1050 Pt to ASC 1 drowsy but arousable to verbal stimuli. No signs of distress noted, respirations even and unlabored. Family at bedside. Bedside report given to ALFREDA Barrios RN. Abd lap sites x4 C/D/I, venus pad C/D/I. Denies pain/needs. /91, P 67, R 14, O2 94% 2L NC.

## 2023-03-28 NOTE — H&P
[facility]  Obstetrics & Gynecology  History & Physical    Patient Name: Rebekah Suarez  MRN: [id number]  Admission Date: 3/28/2023  Primary Care Provider: Primary Doctor No    Subjective:     Chief Complaint/Reason for Admission: Planned procedure    History of Present Illness:  Presents for scheduled surgery. Denies any changes to medical or surgical history, medications or allergies. All questions answered.          OB History    Para Term  AB Living   7 3 3 0 4 3   SAB IAB Ectopic Multiple Live Births   4 0 0 0 3      # Outcome Date GA Lbr Jv/2nd Weight Sex Delivery Anes PTL Lv   7 Term 12/10/22 38w0d 07:47 / 00:51 3.487 kg (7 lb 11 oz) M Vag-Spont Spinal N JILLIAN      Name: BERNIE SUAREZ      Apgar1: 8  Apgar5: 9   6 SAB 2021           5 SAB            4 SAB            3 SAB            2 Term            1 Term              Past Medical History:   Diagnosis Date    Mental disorder      Past Surgical History:   Procedure Laterality Date    DILATION AND CURETTAGE OF UTERUS N/A 2022    Procedure: DILATION AND CURETTAGE, UTERUS;  Surgeon: Abdiel Ding DO;  Location: [facility];  Service: OB/GYN;  Laterality: N/A;    DILATION AND CURETTAGE OF UTERUS  2023    Procedure: DILATION AND CURETTAGE, UTERUS;  Surgeon: Abdiel Ding DO;  Location: [facility] OR;  Service: OB/GYN;;    ENDOMETRIAL ABLATION  2023    Procedure: ABLATION, ENDOMETRIUM;  Surgeon: Abdiel Ding DO;  Location: [facility] OR;  Service: OB/GYN;;  attempted did not   suceed      HYSTEROSCOPY N/A 2022    Procedure: HYSTEROSCOPY, DIAGNOSTIC LAPAROSCOPY, CHROMOTUBATION;  Surgeon: Abdiel Ding DO;  Location: [facility] OR;  Service: OB/GYN;  Laterality: N/A;    LAPAROSCOPIC LIGATION OF FALLOPIAN TUBE Bilateral 2023    Procedure: LIGATION, FALLOPIAN TUBE, LAPAROSCOPIC;  Surgeon: Abdiel Ding DO;  Location: [facility] OR;   Service: OB/GYN;  Laterality: Bilateral;    LAPAROSCOPY N/A 2/22/2022    Procedure: LAPAROSCOPY;  Surgeon: Abdiel Ding DO;  Location: [facility] OR;  Service: OB/GYN;  Laterality: N/A;    miscarriage      x 4     TONSILLECTOMY      TYMPANOPLASTY      x4        PTA Medications   Medication Sig    prenatal vit,poli 74/iron/folic (PRENATAL VITAMIN 1+1 ORAL) Prenatal Vitamin    sertraline 150 mg Cap sertraline 150 mg capsule   Take 1 capsule every day by oral route.    HYDROcodone-acetaminophen (NORCO) 5-325 mg per tablet Take 1 tablet by mouth every 6 (six) hours as needed for Pain.       Review of patient's allergies indicates:   Allergen Reactions    Macrobid [nitrofurantoin monohyd/m-cryst] Hives    Penicillins Hives, Itching, Rash and Swelling        Family History       Problem Relation (Age of Onset)    Stroke Mother          Tobacco Use    Smoking status: Every Day     Types: Vaping with nicotine    Smokeless tobacco: Never   Substance and Sexual Activity    Alcohol use: Never    Drug use: Never    Sexual activity: Yes     Partners: Male     Review of Systems   All other systems reviewed and are negative.   Objective:     Vital Signs (Most Recent):  Temp: 97.8 °F (36.6 °C) (03/28/23 0609)  Pulse: (!) 56 (03/28/23 0609)  Resp: 16 (03/28/23 0609)  BP: 118/60 (03/28/23 0609)  SpO2: 98 % (03/28/23 0609)   Vital Signs (24h Range):  Temp:  [97.8 °F (36.6 °C)] 97.8 °F (36.6 °C)  Pulse:  [56] 56  Resp:  [16] 16  SpO2:  [98 %] 98 %  BP: (118)/(60) 118/60     Weight: 82.6 kg (182 lb)  Body mass index is 32.24 kg/m².    No LMP recorded (lmp unknown).    Physical Exam:   Constitutional: She is oriented to person, place, and time. She appears well-developed and well-nourished.    HENT:   Head: Normocephalic and atraumatic.      Cardiovascular:  Normal rate.             Pulmonary/Chest: Effort normal.        Abdominal: Soft. There is no abdominal tenderness.             Musculoskeletal: Normal range of  motion.       Neurological: She is alert and oriented to person, place, and time.    Skin: Skin is warm and dry.    Psychiatric: She has a normal mood and affect. Her behavior is normal. Judgment and thought content normal.     Laboratory:  CBC: No results for input(s): WBC, RBC, HGB, HCT, PLT, MCV, MCH, MCHC in the last 48 hours.  Recent Lab Results         03/27/23  1451        Antibody ID POS, Rhogam Antibody       Group & Rh A NEG       INDIRECT DIO POS       Specimen Outdate 03/30/2023 23:59             I have personallly reviewed all pertinent lab results from the last 24 hours.    Diagnostic Results:  Labs: Reviewed    Assessment/Plan:     Renal/  Abnormal uterine bleeding  S/p HDC  Failed endometrial ablation  Desires definitive surgical management  Scheduled for CYRUS cystoscopy  Clinda for surgical prophylaxis        Abdiel Ding DO  Obstetrics & Gynecology  [facility]

## 2023-03-28 NOTE — HPI
Presents for scheduled surgery. Denies any changes to medical or surgical history, medications or allergies. All questions answered.

## 2023-03-28 NOTE — ANESTHESIA POSTPROCEDURE EVALUATION
Anesthesia Post Evaluation    Patient: Rebekah Gregg    Procedure(s) Performed: Procedure(s) (LRB):  XI ROBOTIC HYSTERECTOMY (N/A)  CYSTOSCOPY (N/A)    Final Anesthesia Type: general      Patient location during evaluation: PACU  Patient participation: Yes- Able to Participate  Level of consciousness: awake and alert and oriented  Post-procedure vital signs: reviewed and stable  Pain management: adequate  Airway patency: patent  MICHAEL mitigation strategies: Multimodal analgesia and Use of major conduction anesthesia (spinal/epidural) or peripheral nerve block  PONV status at discharge: No PONV  Anesthetic complications: no      Cardiovascular status: hemodynamically stable  Respiratory status: unassisted and spontaneous ventilation  Hydration status: euvolemic  Follow-up not needed.          Vitals Value Taken Time   /70 03/28/23 1034   Temp 37.2 °C (99 °F) 03/28/23 1024   Pulse 71 03/28/23 1036   Resp 14 03/28/23 1030   SpO2 92 % 03/28/23 1036   Vitals shown include unvalidated device data.      No case tracking events are documented in the log.      Pain/Gaby Score: Gaby Score: 8 (3/28/2023 10:30 AM)

## 2023-03-28 NOTE — ASSESSMENT & PLAN NOTE
S/p HDC  Failed endometrial ablation  Desires definitive surgical management  Scheduled for RATLH, cystoscopy  Clinda for surgical prophylaxis

## 2023-03-28 NOTE — ANESTHESIA PROCEDURE NOTES
Intubation    Date/Time: 3/28/2023 8:14 AM  Performed by: Juan J Mg II, CRNA  Authorized by: Luis Manuel Hoover DO     Intubation:     Induction:  Intravenous    Intubated:  Postinduction    Mask Ventilation:  Easy mask    Attempts:  1    Attempted By:  CRNA    Method of Intubation:  Direct    Blade:  Elian 3    Laryngeal View Grade: Grade I - full view of cords      Difficult Airway Encountered?: No      Complications:  None    Airway Device:  Oral endotracheal tube    Airway Device Size:  7.0    Style/Cuff Inflation:  Cuffed    Inflation Amount (mL):  6    Tube secured:  20    Secured at:  The lips    Placement Verified By:  Capnometry    Complicating Factors:  None    Findings Post-Intubation:  BS equal bilateral and atraumatic/condition of teeth unchanged

## 2023-03-28 NOTE — ANESTHESIA PROCEDURE NOTES
Peripheral Block    Patient location during procedure: OR   Block not for primary anesthetic.  Reason for block: at surgeon's request and post-op pain management   Post-op Pain Location: abdominal wall pain   Start time: 3/28/2023 9:51 AM  Timeout: 3/28/2023 9:50 AM   End time: 3/28/2023 9:56 AM    Staffing  Authorizing Provider: Luis Manuel Hoover DO  Performing Provider: Luis Manuel Hoover DO    Preanesthetic Checklist  Completed: patient identified, IV checked, site marked, risks and benefits discussed, surgical consent, monitors and equipment checked, pre-op evaluation and timeout performed  Peripheral Block  Patient position: supine  Prep: ChloraPrep  Patient monitoring: cardiac monitor, heart rate, continuous pulse ox, continuous capnometry and frequent blood pressure checks  Block type: TAP  Laterality: bilateral  Injection technique: single shot  Needle  Needle type: Stimuplex   Needle gauge: 20 G  Needle length: 4 in  Needle localization: ultrasound guidance   -ultrasound image captured on disc.  Assessment  Injection assessment: negative aspiration, negative parasthesia and local visualized surrounding nerve  Paresthesia pain: none  Heart rate change: no  Slow fractionated injection: yes    Medications:    Medications: ROPIvacaine (NAROPIN) injection 0.5% - Perineural   30 mL - 3/28/2023 9:56:00 AM    Additional Notes  VSS.  [facility] RN monitoring vitals throughout procedure.  Patient tolerated procedure well.      0.5% naropin diluted to 0.25% with 30cc per laterality

## 2023-03-28 NOTE — SUBJECTIVE & OBJECTIVE
OB History    Para Term  AB Living   7 3 3 0 4 3   SAB IAB Ectopic Multiple Live Births   4 0 0 0 3      # Outcome Date GA Lbr Jv/2nd Weight Sex Delivery Anes PTL Lv   7 Term 12/10/22 38w0d 07:47 / 00:51 3.487 kg (7 lb 11 oz) M Vag-Spont Spinal N JILLIAN      Name: BERNIE SUAREZ      Apgar1: 8  Apgar5: 9   6 SAB 2021           5 SAB            4 SAB            3 SAB            2 Term            1 Term              Past Medical History:   Diagnosis Date    Mental disorder      Past Surgical History:   Procedure Laterality Date    DILATION AND CURETTAGE OF UTERUS N/A 2022    Procedure: DILATION AND CURETTAGE, UTERUS;  Surgeon: Abdiel Ding DO;  Location: [facility];  Service: OB/GYN;  Laterality: N/A;    DILATION AND CURETTAGE OF UTERUS  2023    Procedure: DILATION AND CURETTAGE, UTERUS;  Surgeon: Abdiel Ding DO;  Location: [facility] OR;  Service: OB/GYN;;    ENDOMETRIAL ABLATION  2023    Procedure: ABLATION, ENDOMETRIUM;  Surgeon: Abdiel Ding DO;  Location: [facility] OR;  Service: OB/GYN;;  attempted did not   suceed      HYSTEROSCOPY N/A 2022    Procedure: HYSTEROSCOPY, DIAGNOSTIC LAPAROSCOPY, CHROMOTUBATION;  Surgeon: Abdiel Ding DO;  Location: [facility];  Service: OB/GYN;  Laterality: N/A;    LAPAROSCOPIC LIGATION OF FALLOPIAN TUBE Bilateral 2023    Procedure: LIGATION, FALLOPIAN TUBE, LAPAROSCOPIC;  Surgeon: Abdiel Ding DO;  Location: [facility] OR;  Service: OB/GYN;  Laterality: Bilateral;    LAPAROSCOPY N/A 2022    Procedure: LAPAROSCOPY;  Surgeon: Abdiel Ding DO;  Location: [facility] OR;  Service: OB/GYN;  Laterality: N/A;    miscarriage      x 4     TONSILLECTOMY      TYMPANOPLASTY      x4        PTA Medications   Medication Sig    prenatal vit,poli 74/iron/folic (PRENATAL VITAMIN 1+1 ORAL) Prenatal Vitamin    sertraline 150 mg Cap sertraline 150 mg capsule   Take 1 capsule every day  by oral route.    HYDROcodone-acetaminophen (NORCO) 5-325 mg per tablet Take 1 tablet by mouth every 6 (six) hours as needed for Pain.       Review of patient's allergies indicates:   Allergen Reactions    Macrobid [nitrofurantoin monohyd/m-cryst] Hives    Penicillins Hives, Itching, Rash and Swelling        Family History       Problem Relation (Age of Onset)    Stroke Mother          Tobacco Use    Smoking status: Every Day     Types: Vaping with nicotine    Smokeless tobacco: Never   Substance and Sexual Activity    Alcohol use: Never    Drug use: Never    Sexual activity: Yes     Partners: Male     Review of Systems   All other systems reviewed and are negative.   Objective:     Vital Signs (Most Recent):  Temp: 97.8 °F (36.6 °C) (03/28/23 0609)  Pulse: (!) 56 (03/28/23 0609)  Resp: 16 (03/28/23 0609)  BP: 118/60 (03/28/23 0609)  SpO2: 98 % (03/28/23 0609)   Vital Signs (24h Range):  Temp:  [97.8 °F (36.6 °C)] 97.8 °F (36.6 °C)  Pulse:  [56] 56  Resp:  [16] 16  SpO2:  [98 %] 98 %  BP: (118)/(60) 118/60     Weight: 82.6 kg (182 lb)  Body mass index is 32.24 kg/m².    No LMP recorded (lmp unknown).    Physical Exam:   Constitutional: She is oriented to person, place, and time. She appears well-developed and well-nourished.    HENT:   Head: Normocephalic and atraumatic.      Cardiovascular:  Normal rate.             Pulmonary/Chest: Effort normal.        Abdominal: Soft. There is no abdominal tenderness.             Musculoskeletal: Normal range of motion.       Neurological: She is alert and oriented to person, place, and time.    Skin: Skin is warm and dry.    Psychiatric: She has a normal mood and affect. Her behavior is normal. Judgment and thought content normal.     Laboratory:  CBC: No results for input(s): WBC, RBC, HGB, HCT, PLT, MCV, MCH, MCHC in the last 48 hours.  Recent Lab Results         03/27/23  1451        Antibody ID POS, Rhogam Antibody       Group & Rh A NEG       INDIRECT DIO POS        Specimen Outdate 03/30/2023 23:59             I have personallly reviewed all pertinent lab results from the last 24 hours.    Diagnostic Results:  Labs: Reviewed

## 2023-03-28 NOTE — ANESTHESIA PREPROCEDURE EVALUATION
03/28/2023  Rebekah Gregg is a 27 y.o., female.      Pre-op Assessment    I have reviewed the Patient Summary Reports.     I have reviewed the Nursing Notes. I have reviewed the NPO Status.   I have reviewed the Medications.     Review of Systems  Anesthesia Hx:  No problems with previous Anesthesia  Denies Family Hx of Anesthesia complications.   Denies Personal Hx of Anesthesia complications.   Social:  No Alcohol Use, Smoker    Hematology/Oncology:  Hematology Normal   Oncology Normal     EENT/Dental:EENT/Dental Normal   Cardiovascular:  Cardiovascular Normal     Pulmonary:  Pulmonary Normal    Renal/:  Renal/ Normal     Hepatic/GI:  Hepatic/GI Normal    Musculoskeletal:  Musculoskeletal Normal    Neurological:  Neurology Normal    Endocrine:  Endocrine Normal  Obesity / BMI > 30  Dermatological:  Skin Normal    Psych:   Psychiatric History          Physical Exam  General: Well nourished    Airway:  Mallampati: II / II  Mouth Opening: Normal  TM Distance: > 6 cm  Tongue: Normal  Neck ROM: Normal ROM    Dental:  Intact    Chest/Lungs:  Clear to auscultation, Normal Respiratory Rate    Heart:  Rate: Normal  Rhythm: Regular Rhythm        Chemistry        Component Value Date/Time     (L) 12/09/2022 2248    K 3.7 12/09/2022 2248     12/09/2022 2248    CO2 23 12/09/2022 2248    BUN 7 12/09/2022 2248    CREATININE 0.75 12/09/2022 2248    GLU 92 12/09/2022 2248        Component Value Date/Time    CALCIUM 8.3 (L) 12/09/2022 2248    ALKPHOS 242 (H) 12/09/2022 2248     (H) 12/09/2022 2248     (H) 12/09/2022 2248    BILITOT 1.1 12/09/2022 2248    EGFRNONAA 90 06/13/2022 1824        Lab Results   Component Value Date    WBC 6.90 02/27/2023    HGB 12.6 02/27/2023    HCT 38.9 02/27/2023     02/27/2023     No results found for this or any previous visit.      Anesthesia  Plan  Type of Anesthesia, risks & benefits discussed:    Anesthesia Type: Gen ETT, Regional  Intra-op Monitoring Plan: Standard ASA Monitors  Post Op Pain Control Plan: multimodal analgesia  Induction:  IV  Informed Consent: Informed consent signed with the Patient and all parties understand the risks and agree with anesthesia plan.  All questions answered. Patient consented to blood products? Yes  ASA Score: 2  Day of Surgery Review of History & Physical: H&P Update referred to the surgeon/provider.I have interviewed and examined the patient. I have reviewed the patient's H&P dated: There are no significant changes.     Ready For Surgery From Anesthesia Perspective.     .

## 2023-03-28 NOTE — DISCHARGE INSTRUCTIONS
Pelvic rest (no sex,no tampons, no douching). May shower tomorrow. No tub bath.Use breathing exercise 10 times each hour while awake. NOTIFY DR ESTRADA FOR ANY PROBLEMS OR RETURN TO ER. FOLLOWUP WITH DR ESTRADA ON 4/10/2023 AT 11:15 AM

## 2023-03-29 NOTE — DISCHARGE SUMMARY
[facility]  Discharge Note  Short Stay    Procedure(s) (LRB):  XI ROBOTIC HYSTERECTOMY (N/A)  CYSTOSCOPY (N/A)      OUTCOME: Patient tolerated treatment/procedure well without complication and is now ready for discharge.    DISPOSITION: Home or Self Care    FINAL DIAGNOSIS:  Abnormal uterine bleeding    FOLLOWUP: In clinic    DISCHARGE INSTRUCTIONS:    Discharge Procedure Orders   Diet general     Call MD for:  extreme fatigue     Call MD for:  persistent dizziness or light-headedness     Call MD for:  hives     Call MD for:  redness, tenderness, or signs of infection (pain, swelling, redness, odor or green/yellow discharge around incision site)     Call MD for:  difficulty breathing, headache or visual disturbances     Call MD for:  severe uncontrolled pain     Call MD for:  persistent nausea and vomiting     Call MD for:  temperature >100.4     No dressing needed         Clinical Reference Documents Added to Patient Instructions         Document    HYSTERECTOMY DISCHARGE INSTRUCTIONS (ENGLISH)    hospitals WORK EXCUSE - ACCOMPANY A PATIENT    ROBOT-ASSISTED HYSTERECTOMY (ENGLISH)            TIME SPENT ON DISCHARGE: <30 minutes

## 2023-04-05 ENCOUNTER — HOSPITAL ENCOUNTER (EMERGENCY)
Facility: HOSPITAL | Age: 28
Discharge: HOME | End: 2023-04-05
Attending: EMERGENCY MEDICINE
Payer: MEDICAID

## 2023-04-05 VITALS
RESPIRATION RATE: 16 BRPM | OXYGEN SATURATION: 98 % | HEIGHT: 63 IN | WEIGHT: 192 LBS | DIASTOLIC BLOOD PRESSURE: 75 MMHG | BODY MASS INDEX: 34.02 KG/M2 | HEART RATE: 79 BPM | SYSTOLIC BLOOD PRESSURE: 115 MMHG | TEMPERATURE: 98 F

## 2023-04-05 DIAGNOSIS — R10.9 ABDOMINAL WALL PAIN: Primary | ICD-10-CM

## 2023-04-05 DIAGNOSIS — G89.18 POSTOPERATIVE PAIN: ICD-10-CM

## 2023-04-05 DIAGNOSIS — R10.84 GENERALIZED ABDOMINAL PAIN: ICD-10-CM

## 2023-04-05 LAB
ALBUMIN SERPL BCP-MCNC: 3.2 G/DL (ref 3.5–5)
ALBUMIN SERPL BCP-MCNC: 3.7 G/DL (ref 3.5–5)
ALBUMIN/GLOB SERPL: 0.7 {RATIO}
ALBUMIN/GLOB SERPL: 0.8 {RATIO}
ALP SERPL-CCNC: 115 U/L (ref 37–98)
ALP SERPL-CCNC: 73 U/L (ref 37–98)
ALT SERPL W P-5'-P-CCNC: 31 U/L (ref 13–56)
ALT SERPL W P-5'-P-CCNC: 35 U/L (ref 13–56)
ANION GAP SERPL CALCULATED.3IONS-SCNC: 11 MMOL/L (ref 7–16)
ANION GAP SERPL CALCULATED.3IONS-SCNC: 13 MMOL/L (ref 7–16)
AST SERPL W P-5'-P-CCNC: 19 U/L (ref 15–37)
AST SERPL W P-5'-P-CCNC: 20 U/L (ref 15–37)
BASOPHILS # BLD AUTO: 0.02 K/UL (ref 0–0.2)
BASOPHILS # BLD AUTO: 0.06 K/UL (ref 0–0.2)
BASOPHILS NFR BLD AUTO: 0.3 % (ref 0–1)
BASOPHILS NFR BLD AUTO: 0.5 % (ref 0–1)
BILIRUB SERPL-MCNC: 0.4 MG/DL (ref ?–1.2)
BILIRUB SERPL-MCNC: 0.4 MG/DL (ref ?–1.2)
BILIRUB UR QL STRIP: NEGATIVE
BUN SERPL-MCNC: 11 MG/DL (ref 7–18)
BUN SERPL-MCNC: 21 MG/DL (ref 7–18)
BUN/CREAT SERPL: 11 (ref 6–20)
BUN/CREAT SERPL: 26 (ref 6–20)
CALCIUM SERPL-MCNC: 8 MG/DL (ref 8.5–10.1)
CALCIUM SERPL-MCNC: 9.6 MG/DL (ref 8.5–10.1)
CHLORIDE SERPL-SCNC: 101 MMOL/L (ref 98–107)
CHLORIDE SERPL-SCNC: 104 MMOL/L (ref 98–107)
CLARITY UR: CLEAR
CO2 SERPL-SCNC: 25 MMOL/L (ref 21–32)
CO2 SERPL-SCNC: 27 MMOL/L (ref 21–32)
COLOR UR: ABNORMAL
CREAT SERPL-MCNC: 0.81 MG/DL (ref 0.55–1.02)
CREAT SERPL-MCNC: 0.96 MG/DL (ref 0.55–1.02)
DIFFERENTIAL METHOD BLD: ABNORMAL
DIFFERENTIAL METHOD BLD: ABNORMAL
EGFR (NO RACE VARIABLE) (RUSH/TITUS): 102 ML/MIN/1.73M²
EGFR (NO RACE VARIABLE) (RUSH/TITUS): 83 ML/MIN/1.73M²
EOSINOPHIL # BLD AUTO: 0.06 K/UL (ref 0–0.5)
EOSINOPHIL # BLD AUTO: 0.39 K/UL (ref 0–0.5)
EOSINOPHIL NFR BLD AUTO: 1 % (ref 1–4)
EOSINOPHIL NFR BLD AUTO: 3.5 % (ref 1–4)
ERYTHROCYTE [DISTWIDTH] IN BLOOD BY AUTOMATED COUNT: 12.8 % (ref 11.5–14.5)
ERYTHROCYTE [DISTWIDTH] IN BLOOD BY AUTOMATED COUNT: 13.2 % (ref 11.5–14.5)
GLOBULIN SER-MCNC: 4 G/DL (ref 2–4)
GLOBULIN SER-MCNC: 5.1 G/DL (ref 2–4)
GLUCOSE SERPL-MCNC: 96 MG/DL (ref 74–106)
GLUCOSE SERPL-MCNC: 99 MG/DL (ref 74–106)
GLUCOSE UR STRIP-MCNC: NORMAL MG/DL
HCT VFR BLD AUTO: 35.5 % (ref 38–47)
HCT VFR BLD AUTO: 40 % (ref 38–47)
HGB BLD-MCNC: 11.8 G/DL (ref 12–16)
HGB BLD-MCNC: 12.6 G/DL (ref 12–16)
IMM GRANULOCYTES # BLD AUTO: 0.01 K/UL (ref 0–0.04)
IMM GRANULOCYTES # BLD AUTO: 0.06 K/UL (ref 0–0.04)
IMM GRANULOCYTES NFR BLD: 0.2 % (ref 0–0.4)
IMM GRANULOCYTES NFR BLD: 0.5 % (ref 0–0.4)
KETONES UR STRIP-SCNC: NEGATIVE MG/DL
LEUKOCYTE ESTERASE UR QL STRIP: NEGATIVE
LYMPHOCYTES # BLD AUTO: 2.8 K/UL (ref 1–4.8)
LYMPHOCYTES # BLD AUTO: 2.96 K/UL (ref 1–4.8)
LYMPHOCYTES NFR BLD AUTO: 24.8 % (ref 27–41)
LYMPHOCYTES NFR BLD AUTO: 48.2 % (ref 27–41)
MCH RBC QN AUTO: 25.1 PG (ref 27–31)
MCH RBC QN AUTO: 29.3 PG (ref 27–31)
MCHC RBC AUTO-ENTMCNC: 31.5 G/DL (ref 32–36)
MCHC RBC AUTO-ENTMCNC: 33.2 G/DL (ref 32–36)
MCV RBC AUTO: 79.8 FL (ref 80–96)
MCV RBC AUTO: 88.1 FL (ref 80–96)
MONOCYTES # BLD AUTO: 0.43 K/UL (ref 0–0.8)
MONOCYTES # BLD AUTO: 0.84 K/UL (ref 0–0.8)
MONOCYTES NFR BLD AUTO: 7 % (ref 2–6)
MONOCYTES NFR BLD AUTO: 7.4 % (ref 2–6)
MPC BLD CALC-MCNC: 9.4 FL (ref 9.4–12.4)
MPC BLD CALC-MCNC: 9.4 FL (ref 9.4–12.4)
NEUTROPHILS # BLD AUTO: 2.66 K/UL (ref 1.8–7.7)
NEUTROPHILS # BLD AUTO: 7.15 K/UL (ref 1.8–7.7)
NEUTROPHILS NFR BLD AUTO: 43.3 % (ref 53–65)
NEUTROPHILS NFR BLD AUTO: 63.3 % (ref 53–65)
NITRITE UR QL STRIP: NEGATIVE
NRBC # BLD AUTO: 0 X10E3/UL
NRBC # BLD AUTO: 0 X10E3/UL
NRBC, AUTO (.00): 0 %
NRBC, AUTO (.00): 0 %
PH UR STRIP: 6.5 PH UNITS
PLATELET # BLD AUTO: 241 K/UL (ref 150–400)
PLATELET # BLD AUTO: 351 K/UL (ref 150–400)
POTASSIUM SERPL-SCNC: 3.8 MMOL/L (ref 3.5–5.1)
POTASSIUM SERPL-SCNC: 4.2 MMOL/L (ref 3.5–5.1)
PROT SERPL-MCNC: 7.2 G/DL (ref 6.4–8.2)
PROT SERPL-MCNC: 8.8 G/DL (ref 6.4–8.2)
PROT UR QL STRIP: NEGATIVE
RBC # BLD AUTO: 4.03 M/UL (ref 4.2–5.4)
RBC # BLD AUTO: 5.01 M/UL (ref 4.2–5.4)
RBC # UR STRIP: NEGATIVE /UL
SODIUM SERPL-SCNC: 135 MMOL/L (ref 136–145)
SODIUM SERPL-SCNC: 138 MMOL/L (ref 136–145)
SP GR UR STRIP: 1.02
UROBILINOGEN UR STRIP-ACNC: 2 MG/DL
WBC # BLD AUTO: 11.3 K/UL (ref 4.5–11)
WBC # BLD AUTO: 6.14 K/UL (ref 4.5–11)

## 2023-04-05 PROCEDURE — 96375 TX/PRO/DX INJ NEW DRUG ADDON: CPT

## 2023-04-05 PROCEDURE — 63600175 PHARM REV CODE 636 W HCPCS: Performed by: EMERGENCY MEDICINE

## 2023-04-05 PROCEDURE — 80053 COMPREHEN METABOLIC PANEL: CPT | Performed by: EMERGENCY MEDICINE

## 2023-04-05 PROCEDURE — 25000003 PHARM REV CODE 250: Performed by: EMERGENCY MEDICINE

## 2023-04-05 PROCEDURE — 96374 THER/PROPH/DIAG INJ IV PUSH: CPT | Mod: 59

## 2023-04-05 PROCEDURE — 96361 HYDRATE IV INFUSION ADD-ON: CPT

## 2023-04-05 PROCEDURE — 81003 URINALYSIS AUTO W/O SCOPE: CPT | Performed by: EMERGENCY MEDICINE

## 2023-04-05 PROCEDURE — 99284 EMERGENCY DEPT VISIT MOD MDM: CPT | Mod: ,,, | Performed by: EMERGENCY MEDICINE

## 2023-04-05 PROCEDURE — 99285 EMERGENCY DEPT VISIT HI MDM: CPT | Mod: 25

## 2023-04-05 PROCEDURE — 25500020 PHARM REV CODE 255: Performed by: EMERGENCY MEDICINE

## 2023-04-05 PROCEDURE — 96376 TX/PRO/DX INJ SAME DRUG ADON: CPT

## 2023-04-05 PROCEDURE — 99284 PR EMERGENCY DEPT VISIT,LEVEL IV: ICD-10-PCS | Mod: ,,, | Performed by: EMERGENCY MEDICINE

## 2023-04-05 PROCEDURE — 85025 COMPLETE CBC W/AUTO DIFF WBC: CPT | Performed by: EMERGENCY MEDICINE

## 2023-04-05 RX ORDER — HYDROCODONE BITARTRATE AND ACETAMINOPHEN 7.5; 325 MG/1; MG/1
1 TABLET ORAL EVERY 6 HOURS PRN
Qty: 12 TABLET | Refills: 0 | Status: SHIPPED | OUTPATIENT
Start: 2023-04-05

## 2023-04-05 RX ORDER — HYDROCODONE BITARTRATE AND ACETAMINOPHEN 7.5; 325 MG/1; MG/1
1 TABLET ORAL EVERY 6 HOURS PRN
Status: DISCONTINUED | OUTPATIENT
Start: 2023-04-05 | End: 2023-04-06 | Stop reason: HOSPADM

## 2023-04-05 RX ORDER — ONDANSETRON 2 MG/ML
4 INJECTION INTRAMUSCULAR; INTRAVENOUS
Status: COMPLETED | OUTPATIENT
Start: 2023-04-05 | End: 2023-04-05

## 2023-04-05 RX ORDER — MORPHINE SULFATE 4 MG/ML
4 INJECTION, SOLUTION INTRAMUSCULAR; INTRAVENOUS
Status: COMPLETED | OUTPATIENT
Start: 2023-04-05 | End: 2023-04-05

## 2023-04-05 RX ADMIN — ONDANSETRON HYDROCHLORIDE 4 MG: 2 SOLUTION INTRAMUSCULAR; INTRAVENOUS at 07:04

## 2023-04-05 RX ADMIN — IOPAMIDOL 100 ML: 755 INJECTION, SOLUTION INTRAVENOUS at 08:04

## 2023-04-05 RX ADMIN — MORPHINE SULFATE 4 MG: 4 INJECTION, SOLUTION INTRAMUSCULAR; INTRAVENOUS at 07:04

## 2023-04-05 RX ADMIN — SODIUM CHLORIDE 1000 ML: 9 INJECTION, SOLUTION INTRAVENOUS at 07:04

## 2023-04-05 RX ADMIN — MORPHINE SULFATE 4 MG: 4 INJECTION, SOLUTION INTRAMUSCULAR; INTRAVENOUS at 09:04

## 2023-04-05 RX ADMIN — HYDROCODONE BITARTRATE AND ACETAMINOPHEN 1 TABLET: 7.5; 325 TABLET ORAL at 10:04

## 2023-04-05 NOTE — ED PROVIDER NOTES
Encounter Date: 4/5/2023    SCRIBE #1 NOTE: I, Ana Maria Jessica, am scribing for, and in the presence of,  Laron Tilley MD. I have scribed the entire note.     History     Chief Complaint   Patient presents with    Abdominal Pain     Pt had hysterectomy 3/28 and pain has got worse since then.      The patient is a 27 y.o. female that presents to the emergency department due to severe abdominal pain. The patient explains that on March 28 Dr. Ding did a hysterectomy and after the surgery she was fine but today April 5 she began experiencing severe abdominal pain. The patient states she has been able to use to restroom and has had no constipation. She has been also experiencing some nausea but has had no vomiting. The patient does have 3 kids. No other symptoms were reported.      The history is provided by the patient. No  was used.   Review of patient's allergies indicates:   Allergen Reactions    Macrobid [nitrofurantoin monohyd/m-cryst] Hives    Penicillins Hives, Itching, Rash and Swelling     Past Medical History:   Diagnosis Date    Mental disorder      Past Surgical History:   Procedure Laterality Date    CYSTOSCOPY N/A 03/28/2023    Procedure: CYSTOSCOPY;  Surgeon: Abdiel Ding DO;  Location: [facility] OR;  Service: OB/GYN;  Laterality: N/A;    DILATION AND CURETTAGE OF UTERUS N/A 02/22/2022    Procedure: DILATION AND CURETTAGE, UTERUS;  Surgeon: Abdiel Ding DO;  Location: [facility] OR;  Service: OB/GYN;  Laterality: N/A;    DILATION AND CURETTAGE OF UTERUS  02/28/2023    Procedure: DILATION AND CURETTAGE, UTERUS;  Surgeon: Abdiel Ding DO;  Location: [facility] OR;  Service: OB/GYN;;    ENDOMETRIAL ABLATION  02/28/2023    Procedure: ABLATION, ENDOMETRIUM;  Surgeon: Abdiel Ding DO;  Location: [facility] OR;  Service: OB/GYN;;  attempted did not   suceed      HYSTERECTOMY      HYSTEROSCOPY N/A 02/22/2022    Procedure: HYSTEROSCOPY,  DIAGNOSTIC LAPAROSCOPY, CHROMOTUBATION;  Surgeon: Abdiel Ding DO;  Location: [facility] OR;  Service: OB/GYN;  Laterality: N/A;    LAPAROSCOPIC LIGATION OF FALLOPIAN TUBE Bilateral 02/28/2023    Procedure: LIGATION, FALLOPIAN TUBE, LAPAROSCOPIC;  Surgeon: Abdiel Ding DO;  Location: [facility] OR;  Service: OB/GYN;  Laterality: Bilateral;    LAPAROSCOPY N/A 02/22/2022    Procedure: LAPAROSCOPY;  Surgeon: Abdiel Ding DO;  Location: [facility] OR;  Service: OB/GYN;  Laterality: N/A;    miscarriage      x 4     ROBOT-ASSISTED LAPAROSCOPIC ABDOMINAL HYSTERECTOMY USING DA ELIEL XI N/A 03/28/2023    Procedure: XI ROBOTIC HYSTERECTOMY;  Surgeon: Abdiel Ding DO;  Location: [facility] OR;  Service: OB/GYN;  Laterality: N/A;    TONSILLECTOMY      TYMPANOPLASTY      x4      Family History   Problem Relation Age of Onset    Stroke Mother      Social History     Tobacco Use    Smoking status: Former     Types: Vaping with nicotine    Smokeless tobacco: Never   Substance Use Topics    Alcohol use: Never    Drug use: Never     Review of Systems   Constitutional: Negative.  Negative for fever.   HENT: Negative.     Eyes: Negative.    Respiratory: Negative.     Cardiovascular: Negative.    Gastrointestinal:  Positive for abdominal pain and nausea. Negative for constipation and vomiting.   Endocrine: Negative.    Genitourinary: Negative.    Musculoskeletal: Negative.    Skin: Negative.    Allergic/Immunologic: Negative.    Neurological: Negative.    Hematological: Negative.    Psychiatric/Behavioral: Negative.     All other systems reviewed and are negative.    Physical Exam     Initial Vitals [04/05/23 1833]   BP Pulse Resp Temp SpO2   (!) 108/59 90 16 98.3 °F (36.8 °C) 97 %      MAP       --         Physical Exam    Constitutional: She appears well-developed and well-nourished.   Eyes: Conjunctivae and EOM are normal. Pupils are equal, round, and reactive to light.   Neck: Neck supple.    Normal range of motion.  Cardiovascular:  Normal heart sounds.           Pulmonary/Chest: Breath sounds normal.   Abdominal: Bowel sounds are normal. There is abdominal tenderness.   Musculoskeletal:         General: Normal range of motion.      Cervical back: Normal range of motion and neck supple.      Comments: Tenderness around right hip     Neurological: She is alert and oriented to person, place, and time. She has normal strength and normal reflexes.   Skin: Skin is warm and dry.   Psychiatric: She has a normal mood and affect. Her behavior is normal. Judgment and thought content normal.       ED Course   Procedures  Labs Reviewed   COMPREHENSIVE METABOLIC PANEL - Abnormal; Notable for the following components:       Result Value    Total Protein 8.8 (*)     Globulin 5.1 (*)     All other components within normal limits   URINALYSIS, REFLEX TO URINE CULTURE - Abnormal; Notable for the following components:    Urobilinogen, UA 2 (*)     All other components within normal limits   CBC WITH DIFFERENTIAL - Abnormal; Notable for the following components:    MCV 79.8 (*)     MCH 25.1 (*)     MCHC 31.5 (*)     Neutrophils % 43.3 (*)     Lymphocytes % 48.2 (*)     Monocytes % 7.0 (*)     All other components within normal limits   COMPREHENSIVE METABOLIC PANEL - Abnormal; Notable for the following components:    Sodium 135 (*)     BUN 21 (*)     BUN/Creatinine Ratio 26 (*)     Calcium 8.0 (*)     Albumin 3.2 (*)     Alk Phos 115 (*)     All other components within normal limits   CBC WITH DIFFERENTIAL - Abnormal; Notable for the following components:    WBC 11.30 (*)     RBC 4.03 (*)     Hemoglobin 11.8 (*)     Hematocrit 35.5 (*)     Lymphocytes % 24.8 (*)     Monocytes % 7.4 (*)     Immature Granulocytes % 0.5 (*)     Monocytes, Absolute 0.84 (*)     Immature Granulocytes, Absolute 0.06 (*)     All other components within normal limits   CBC W/ AUTO DIFFERENTIAL    Narrative:     The following orders were created  for panel order CBC auto differential.  Procedure                               Abnormality         Status                     ---------                               -----------         ------                     CBC with Differential[[id number]]        Abnormal            Edited Result - FINAL        Please view results for these tests on the individual orders.   CBC W/ AUTO DIFFERENTIAL    Narrative:     The following orders were created for panel order CBC auto differential.  Procedure                               Abnormality         Status                     ---------                               -----------         ------                     CBC with Differential[[id number]]        Abnormal            Final result                 Please view results for these tests on the individual orders.          Imaging Results              CT Abdomen Pelvis With Contrast (Final result)  Result time 04/05/23 20:16:14      Final result by Gil King MD (04/05/23 20:16:14)                   Impression:      Postoperative changes from hysterectomy.  There is a small amount of free fluid in stranding at the hysterectomy bed.  Minimal enhancement but no abscess defined.    No bowel obstruction or acute bowel abnormality.      Electronically signed by: Gil King  Date:    04/05/2023  Time:    20:16               Narrative:    EXAMINATION:  CT ABDOMEN PELVIS WITH CONTRAST    CLINICAL HISTORY:  Abdominal abscess/infection suspected;  recent hysterectomy now with severe abdominal pain    TECHNIQUE:  Low dose axial images, sagittal and coronal reformations were obtained from the lung bases to the pubic symphysis following the IV administration of 100 mL of Isovue 370 .  Oral contrast was not given.    The CT examination was performed using one or more of the following dose reduction techniques: Automated exposure control, adjustment of the mA and kV according to patient's size, use of acute or iterative reconstruction  techniques.    COMPARISON:  09/30/2015 CT    FINDINGS:  Lung bases are clear.    The liver and gallbladder normal.  The adrenals and spleen appear normal.  Pancreas normal.  Kidneys are normal.  The ureters have a normal course and appearance.  Urinary bladder unremarkable.    No pneumoperitoneum.  There is a trace amount of fluid in the cul-de-sac/hysterectomy bed.  There is a minimal amount of enhancement but no abscess detected.    No bowel obstruction or acute bowel abnormality.  The appendix is normal.    No adenopathy.  Vascular structures appear normal.    No acute fracture.                                       Medications   HYDROcodone-acetaminophen 7.5-325 mg per tablet 1 tablet (1 tablet Oral Given 4/5/23 2219)   sodium chloride 0.9% bolus 1,000 mL 1,000 mL (0 mLs Intravenous Stopped 4/5/23 2005)   morphine injection 4 mg (4 mg Intravenous Given 4/5/23 1904)   ondansetron injection 4 mg (4 mg Intravenous Given 4/5/23 1904)   iopamidoL (ISOVUE-370) injection 100 mL (100 mLs Intravenous Given 4/5/23 2029)   morphine injection 4 mg (4 mg Intravenous Given 4/5/23 2107)     Medical Decision Making:   Initial Assessment:   2021:  Dr. Ding tried to due a ablation surgery 3 weeks ago due to heavy bleeding but felt as if hysterectomy was best option  Does not have Hemorid's     ED Management:  [facility]    Patient presents for emergent evaluation of acute abdominal pain that poses a threat to life and/or bodily function.    In the ED patient found to have acute abdominal wall pain.    I ordered labs and personally reviewed them.  Labs significant for no leukocytosis.  Hemoglobin unremarkable.  Elevated BUN over creatinine ratio  Electrolytes and kidney function normal  I ordered CT scan and personally reviewed it and reviewed the radiologist interpretation.  CT significant for Trace amount of fluid in the cul-de-sac but no unexpected findings after hysterectomy.      Discharge [facility]  I discussed the patient  presentation labs, ekg, X-rays, CT findings with the consultant for OBGYN (speciality).    Patient was managed in the ED with IV morphine normal saline.    The response to treatment was improved.    Patient was discharged in stable condition.  Detailed return precautions discussed.            Attending Attestation:           Physician Attestation for Scribe:  Physician Attestation Statement for Scribe #1: I, Laron Tilely MD, reviewed documentation, as scribed by Ana Maria Jessica in my presence, and it is both accurate and complete.           ED Course as of 04/05/23 2238 Wed Apr 05, 2023 2053 Discussed with OBGYN on-call Dr. Lisa will see the patient in the ED. patient has no fever white count is only 11.3 in no concern for infection on CT images. [PK]   2204 Dr. Lisa saw the patient in the ED patient was riding the lawn yesterday we think this is related to increased activity and abdominal wall pain.  White count is 11.3.  No CT evidence of infection patient improved treatment in the ER. [PK]      ED Course User Index  [PK] Laron Tilley MD                 Clinical Impression:   Final diagnoses:  [R10.9] Abdominal wall pain (Primary)  [R10.84] Generalized abdominal pain  [G89.18] Postoperative pain        ED Disposition Condition    Discharge Stable          ED Prescriptions       Medication Sig Dispense Start Date End Date Auth. Provider    HYDROcodone-acetaminophen (NORCO) 7.5-325 mg per tablet Take 1 tablet by mouth every 6 (six) hours as needed for Pain. 12 tablet 4/5/2023 -- Laron Tilley MD          Follow-up Information       Follow up With Specialties Details Why Contact Info    Abdiel Ding DO Obstetrics and Gynecology Call on 4/6/2023 to schedule a followup appointment [address] [place] [id number]  [phone]               Laron Tilley MD  04/05/23 2238

## 2023-04-06 NOTE — DISCHARGE INSTRUCTIONS
Reduce activity.  Return to ER symptoms are worsening or new symptoms develop.  Follow-up with OBGYN

## 2023-04-10 ENCOUNTER — OFFICE VISIT (OUTPATIENT)
Dept: OBSTETRICS AND GYNECOLOGY | Facility: CLINIC | Age: 28
End: 2023-04-10
Payer: MEDICAID

## 2023-04-10 VITALS
OXYGEN SATURATION: 98 % | WEIGHT: 191.19 LBS | BODY MASS INDEX: 33.88 KG/M2 | HEART RATE: 70 BPM | RESPIRATION RATE: 17 BRPM | SYSTOLIC BLOOD PRESSURE: 106 MMHG | HEIGHT: 63 IN | DIASTOLIC BLOOD PRESSURE: 70 MMHG | TEMPERATURE: 98 F

## 2023-04-10 DIAGNOSIS — Z48.89 ENCOUNTER FOR POST SURGICAL WOUND CHECK: Primary | ICD-10-CM

## 2023-04-10 PROBLEM — O99.341 ANXIETY DURING PREGNANCY IN FIRST TRIMESTER, ANTEPARTUM: Status: RESOLVED | Noted: 2022-05-06 | Resolved: 2023-04-10

## 2023-04-10 PROBLEM — F41.9 ANXIETY DURING PREGNANCY IN FIRST TRIMESTER, ANTEPARTUM: Status: RESOLVED | Noted: 2022-05-06 | Resolved: 2023-04-10

## 2023-04-10 PROCEDURE — 3074F PR MOST RECENT SYSTOLIC BLOOD PRESSURE < 130 MM HG: ICD-10-PCS | Mod: CPTII,,, | Performed by: STUDENT IN AN ORGANIZED HEALTH CARE EDUCATION/TRAINING PROGRAM

## 2023-04-10 PROCEDURE — 3078F DIAST BP <80 MM HG: CPT | Mod: CPTII,,, | Performed by: STUDENT IN AN ORGANIZED HEALTH CARE EDUCATION/TRAINING PROGRAM

## 2023-04-10 PROCEDURE — 99214 OFFICE O/P EST MOD 30 MIN: CPT | Mod: PBBFAC | Performed by: STUDENT IN AN ORGANIZED HEALTH CARE EDUCATION/TRAINING PROGRAM

## 2023-04-10 PROCEDURE — 3074F SYST BP LT 130 MM HG: CPT | Mod: CPTII,,, | Performed by: STUDENT IN AN ORGANIZED HEALTH CARE EDUCATION/TRAINING PROGRAM

## 2023-04-10 PROCEDURE — 99024 POSTOP FOLLOW-UP VISIT: CPT | Mod: ,,, | Performed by: STUDENT IN AN ORGANIZED HEALTH CARE EDUCATION/TRAINING PROGRAM

## 2023-04-10 PROCEDURE — 3008F PR BODY MASS INDEX (BMI) DOCUMENTED: ICD-10-PCS | Mod: CPTII,,, | Performed by: STUDENT IN AN ORGANIZED HEALTH CARE EDUCATION/TRAINING PROGRAM

## 2023-04-10 PROCEDURE — 99024 PR POST-OP FOLLOW-UP VISIT: ICD-10-PCS | Mod: ,,, | Performed by: STUDENT IN AN ORGANIZED HEALTH CARE EDUCATION/TRAINING PROGRAM

## 2023-04-10 PROCEDURE — 3008F BODY MASS INDEX DOCD: CPT | Mod: CPTII,,, | Performed by: STUDENT IN AN ORGANIZED HEALTH CARE EDUCATION/TRAINING PROGRAM

## 2023-04-10 PROCEDURE — 1159F MED LIST DOCD IN RCRD: CPT | Mod: CPTII,,, | Performed by: STUDENT IN AN ORGANIZED HEALTH CARE EDUCATION/TRAINING PROGRAM

## 2023-04-10 PROCEDURE — 1159F PR MEDICATION LIST DOCUMENTED IN MEDICAL RECORD: ICD-10-PCS | Mod: CPTII,,, | Performed by: STUDENT IN AN ORGANIZED HEALTH CARE EDUCATION/TRAINING PROGRAM

## 2023-04-10 PROCEDURE — 3078F PR MOST RECENT DIASTOLIC BLOOD PRESSURE < 80 MM HG: ICD-10-PCS | Mod: CPTII,,, | Performed by: STUDENT IN AN ORGANIZED HEALTH CARE EDUCATION/TRAINING PROGRAM

## 2023-04-10 NOTE — PROGRESS NOTES
"Subjective:       Rebekah Gregg is a 27 y.o. female who presents to the clinic 2 weeks status post Davinci assisted hysterectomy for abnormal uterine bleeding. Eating a regular diet without difficulty. Bowel movements are normal. The patient is not having any pain.    The following portions of the patient's history were reviewed and updated as appropriate: allergies, current medications, past family history, past medical history, past social history, past surgical history, and problem list.    Review of Systems  A comprehensive review of systems was negative.      Objective:      /70 (BP Location: Left arm, Patient Position: Sitting)   Pulse 70   Temp 98.3 °F (36.8 °C) (Oral)   Resp 17   Ht 5' 3" (1.6 m)   Wt 86.7 kg (191 lb 3.2 oz)   LMP  (LMP Unknown)   SpO2 98%   Breastfeeding Yes   BMI 33.87 kg/m²   General:  alert, appears stated age, and cooperative   Abdomen: soft, non-tender   Incisions:   healing well, no drainage, no erythema, no hernia, no seroma, no swelling, no dehiscence, incision well approximated       Assessment:      Doing well postoperatively.  Operative findings again reviewed. Pathology report discussed.      Plan:      1. Continue any current medications.  2. Wound care discussed.  3. Activity restrictions:  pelvic rest  4. Anticipated return to work: not applicable.  5. Follow up: 4 weeks.  "

## 2023-05-08 ENCOUNTER — OFFICE VISIT (OUTPATIENT)
Dept: OBSTETRICS AND GYNECOLOGY | Facility: CLINIC | Age: 28
End: 2023-05-08
Payer: MEDICAID

## 2023-05-08 VITALS
DIASTOLIC BLOOD PRESSURE: 65 MMHG | HEIGHT: 63 IN | BODY MASS INDEX: 35.26 KG/M2 | SYSTOLIC BLOOD PRESSURE: 117 MMHG | OXYGEN SATURATION: 98 % | WEIGHT: 199 LBS | TEMPERATURE: 98 F | HEART RATE: 74 BPM | RESPIRATION RATE: 17 BRPM

## 2023-05-08 DIAGNOSIS — Z48.89 ENCOUNTER FOR POST SURGICAL WOUND CHECK: Primary | ICD-10-CM

## 2023-05-08 PROCEDURE — 3078F DIAST BP <80 MM HG: CPT | Mod: CPTII,,, | Performed by: STUDENT IN AN ORGANIZED HEALTH CARE EDUCATION/TRAINING PROGRAM

## 2023-05-08 PROCEDURE — 3078F PR MOST RECENT DIASTOLIC BLOOD PRESSURE < 80 MM HG: ICD-10-PCS | Mod: CPTII,,, | Performed by: STUDENT IN AN ORGANIZED HEALTH CARE EDUCATION/TRAINING PROGRAM

## 2023-05-08 PROCEDURE — 3074F SYST BP LT 130 MM HG: CPT | Mod: CPTII,,, | Performed by: STUDENT IN AN ORGANIZED HEALTH CARE EDUCATION/TRAINING PROGRAM

## 2023-05-08 PROCEDURE — 3008F PR BODY MASS INDEX (BMI) DOCUMENTED: ICD-10-PCS | Mod: CPTII,,, | Performed by: STUDENT IN AN ORGANIZED HEALTH CARE EDUCATION/TRAINING PROGRAM

## 2023-05-08 PROCEDURE — 99214 OFFICE O/P EST MOD 30 MIN: CPT | Mod: PBBFAC | Performed by: STUDENT IN AN ORGANIZED HEALTH CARE EDUCATION/TRAINING PROGRAM

## 2023-05-08 PROCEDURE — 1159F MED LIST DOCD IN RCRD: CPT | Mod: CPTII,,, | Performed by: STUDENT IN AN ORGANIZED HEALTH CARE EDUCATION/TRAINING PROGRAM

## 2023-05-08 PROCEDURE — 3008F BODY MASS INDEX DOCD: CPT | Mod: CPTII,,, | Performed by: STUDENT IN AN ORGANIZED HEALTH CARE EDUCATION/TRAINING PROGRAM

## 2023-05-08 PROCEDURE — 3074F PR MOST RECENT SYSTOLIC BLOOD PRESSURE < 130 MM HG: ICD-10-PCS | Mod: CPTII,,, | Performed by: STUDENT IN AN ORGANIZED HEALTH CARE EDUCATION/TRAINING PROGRAM

## 2023-05-08 PROCEDURE — 1159F PR MEDICATION LIST DOCUMENTED IN MEDICAL RECORD: ICD-10-PCS | Mod: CPTII,,, | Performed by: STUDENT IN AN ORGANIZED HEALTH CARE EDUCATION/TRAINING PROGRAM

## 2023-05-08 PROCEDURE — 99024 POSTOP FOLLOW-UP VISIT: CPT | Mod: ,,, | Performed by: STUDENT IN AN ORGANIZED HEALTH CARE EDUCATION/TRAINING PROGRAM

## 2023-05-08 PROCEDURE — 99024 PR POST-OP FOLLOW-UP VISIT: ICD-10-PCS | Mod: ,,, | Performed by: STUDENT IN AN ORGANIZED HEALTH CARE EDUCATION/TRAINING PROGRAM

## 2023-05-08 NOTE — PROGRESS NOTES
"Subjective:       Rebekah Gregg is a 27 y.o. female who presents to the clinic 6 weeks status post Davinci assisted hysterectomy for abnormal uterine bleeding. Eating a regular diet without difficulty. Bowel movements are normal. The patient is not having any pain.  C/o being hot all the time.    The following portions of the patient's history were reviewed and updated as appropriate: allergies, current medications, past family history, past medical history, past social history, past surgical history, and problem list.    Review of Systems  Pertinent items are noted in HPI.      Objective:      /65 (BP Location: Left arm, Patient Position: Sitting)   Pulse 74   Temp 97.7 °F (36.5 °C) (Oral)   Resp 17   Ht 5' 3" (1.6 m)   Wt 90.3 kg (199 lb)   LMP  (LMP Unknown)   SpO2 98%   Breastfeeding Yes   BMI 35.25 kg/m²   General:  alert, appears stated age, and cooperative   Abdomen: soft, non-tender   Incision:   healing well, no drainage, no erythema, no hernia, no seroma, no swelling, no dehiscence, incision well approximated          Vagina:  Normal vagina, cuff intact  Assessment:      Doing well postoperatively.  Operative findings again reviewed. Pathology report discussed.      Plan:      1. Continue any current medications.  2. Wound care discussed.  3. Activity restrictions:  no intercourse for 2 more weeks  4. Anticipated return to work: now.  5. Follow up: 1 year for annual.   "

## 2023-09-22 NOTE — ED PROVIDER NOTES
OPERATIVE PROCEDURE NOTE    NAME: Camilo Velazquez    MRN: [id number]    DATE OF PROCEDURE: 9/22/2023    PREOPERATIVE DIAGNOSIS:  Recurrent rotator cuff tear right shoulder    POSTOPERATIVE DIAGNOSIS:  1. Recurrent rotator cuff tear right shoulder   2. Ruptured long head of biceps tendon, right shoulder   3. Chondromalacia right shoulder     OPERATIVE PROCEDURE:  Right Shoulder Arthroscopy with:  1. Arthroscopic extensive intra-articular, subacromial, and acromioclavicular debridement  2. Arthroscopic subacromial decompression  3. Arthroscopic revision rotator cuff repair    SURGEON:  Jasen Syed M.D.    ASSISTANT:  Mariel Hadley PA-C    ANESTHESIA:  General w/Regional Block    BLOOD LOSS:  Minimal    COMPLICATIONS:  None    IMPLANTS:  Implant Name Type Inv. Item Serial No.  Lot No. LRB No. Used Action   ANCHOR SUT 2 5.5MM PRELOAD OPEN ARCHITECTURE [facility] HEALICOIL - [id number] [place] ANCHOR SUT 2 5.5MM PRELOAD OPEN ARCHITECTURE [facility] HEALICOIL  [employer] [id number] Right 1 Implanted   ANCHOR SUT 5.5MM 2 HEALICO[employer] PK ULTRATAPE ULBRD COBRAID BLUE - [id number] [place] ANCHOR SUT 5.5MM 2 HEALICOIL PK ULTRATAPE ULBRD COBRAID BLUE  [employer] [id number] Right 1 Implanted       SPECIMENS: NONE    SURGICAL INDICATIONS:  This patient is a  58 year old year old male with ongoing and persistent pain in the right shoulder due to rotator cuff pathology as outlined in clinical notes.  He is a  who sustained a work injury to the right shoulder.  He had surgery on his right shoulder in 2009 and, in general, the shoulder was doing fine before this injury.  Pro's and con's as well as risks versus benefits of surgical treatment have been reviewed with this patient. Given this clinical problem and given this patient's clinical picture, I have recommended arthroscopic treatment of this rotator cuff problem.    DESCRIPTION OF PROCEDURE:  The patient was brought into the operating room  Encounter Date: 2022       History     Chief Complaint   Patient presents with    Vaginal Bleeding     Had miscarriage .  Started bleeding 3 days ago, large clots.  Weakness     Abdominal Pain     Patient presents to ER with complaint of heavy vaginal bleeding with dizziness and weakness.  Patient states symptoms started 3 days ago and has progressively worsened.  She is  with most recent miscarriage on 21.  She was scheduled to see JANNA Nichols CNM prior to miscarriage but never established care.  She states she had bleeding at time of miscarriage but resolved.  She states she started cycle 3 days ago and bleeding has increased daily.  She states yesterday she used at least 36 pads in 24 hours.  She denies fever or dysuria.  No vaginal discharge or odor prior or with onset of bleeding.  She does complain of nausea, but denies vomiting.     The history is provided by the patient and a parent. A  was used.     Review of patient's allergies indicates:   Allergen Reactions    Macrobid [nitrofurantoin monohyd/m-cryst] Hives    Pcn [penicillins] Hives and Itching     History reviewed. No pertinent past medical history.  Past Surgical History:   Procedure Laterality Date    miscarriage      x 4     TONSILLECTOMY       History reviewed. No pertinent family history.  Social History     Tobacco Use    Smoking status: Current Every Day Smoker     Types: Vaping with nicotine    Smokeless tobacco: Never Used   Substance Use Topics    Alcohol use: Never    Drug use: Never     Review of Systems   Constitutional: Positive for fatigue.   Genitourinary: Positive for pelvic pain and vaginal bleeding.   Neurological: Positive for dizziness and weakness.   All other systems reviewed and are negative.      Physical Exam     Initial Vitals [22 1140]   BP Pulse Resp Temp SpO2   (!) 101/52 90 16 98.1 °F (36.7 °C) 100 %      MAP       --         Physical Exam    Nursing note and vitals  and placed supine on the operating room table. Routine anesthesia monitors were placed and general anesthesia was induced with oral endotrachial intubation. The following preoperative antibiotic was administered:  2 g Ancef. The right shoulder was examined under anesthesia. Range of motion assessment revealed no pathological laxity or stiffness. The patient was then turned into the left lateral decubitus position with the right side up. All pressure points were padded including an axillary roll and peroneal nerve padding. The right shoulder was prepped and draped in the usual and sterile fashion. The arm was suspended in 70 degrees of abduction with 9 lbs of distal support.  A standard posterior superior portal was then established followed by an anterior superior portal using needle localization.  A diagnostic glenohumeral arthroscopy was then performed. The findings are as follows:    The biceps tendon had ruptured 1 cm from the insertion on the superior glenoid tubercle. The biceps anchor had moderate fraying and tearing. The posterior labrum also had moderate fraying. The anterior and inferior labrum were intact. The inferior, middle, and superior glenohumeral ligaments were intact. The subscapularis tendon was scarred, but intact.  It took up satisfactory tension with external rotation of the arm. The articular cartilage of the glenoid revealed grade 1-2 chondromalacia. The articular cartilage of the humeral head revealed grade 1-2 chondromalacia. The articular insertion of the supraspinatus and infraspinatus revealed a full-thickness tear.  The shaver was introduced for debridement of the glenoid including the ruptured long head of biceps tendon stump as well as the posterior and the superior labrum.  The articular cartilage of the glenoid and humeral head were both debrided at this time.  The upper subscapularis was also debrided.. A marker suture of 0 PDS was placed in the supraspinatus footprint.    The  reviewed.  Constitutional: She appears well-developed and well-nourished.   HENT:   Head: Normocephalic.   Right Ear: External ear normal.   Left Ear: External ear normal.   Nose: Nose normal.   Mouth/Throat: Oropharynx is clear and moist.   Eyes: Conjunctivae and EOM are normal. Pupils are equal, round, and reactive to light.   Neck: Neck supple.   Normal range of motion.  Cardiovascular: Normal rate, regular rhythm, normal heart sounds and intact distal pulses.   Pulmonary/Chest: Breath sounds normal.   Abdominal: Abdomen is soft. Bowel sounds are normal. There is abdominal tenderness (lower abd/ suprapubic).   Musculoskeletal:         General: Normal range of motion.      Cervical back: Normal range of motion and neck supple.     Neurological: She is alert and oriented to person, place, and time. She has normal strength. GCS score is 15. GCS eye subscore is 4. GCS verbal subscore is 5. GCS motor subscore is 6.   Skin: Skin is warm and dry. Capillary refill takes less than 2 seconds.   Psychiatric: She has a normal mood and affect. Her behavior is normal. Judgment and thought content normal.         Medical Screening Exam   See Full Note    ED Course   Procedures  Labs Reviewed   BASIC METABOLIC PANEL - Abnormal; Notable for the following components:       Result Value    Chloride 108 (*)     All other components within normal limits   URINALYSIS, REFLEX TO URINE CULTURE - Abnormal; Notable for the following components:    Clarity, UA Slightly Cloudy (*)     Blood, UA Large (*)     Specific Gravity, UA >=1.030 (*)     All other components within normal limits   CBC WITH DIFFERENTIAL - Abnormal; Notable for the following components:    Neutrophils % 89.8 (*)     Lymphocytes % 5.2 (*)     Neutrophils, Abs 7.73 (*)     Lymphocytes, Absolute 0.45 (*)     All other components within normal limits   MANUAL DIFFERENTIAL - Abnormal; Notable for the following components:    Segmented Neutrophils, Man % 80 (*)     Bands,  Man % 8 (*)     Lymphocytes, Man % 6 (*)     All other components within normal limits   URINALYSIS, MICROSCOPIC - Abnormal; Notable for the following components:    RBC, UA Too Numerous To Count (*)     Squamous Epithelial Cells, UA Few (*)     Mucus, UA Rare (*)     All other components within normal limits   CBC W/ AUTO DIFFERENTIAL    Narrative:     The following orders were created for panel order CBC auto differential.  Procedure                               Abnormality         Status                     ---------                               -----------         ------                     CBC with Differential[[id number]]        Abnormal            Final result               Manual Differential[[id number]]          Abnormal            Final result                 Please view results for these tests on the individual orders.   HCG, TOTAL, QUANTITATIVE   POCT URINE PREGNANCY          Imaging Results    None          Medications   sodium chloride 0.9% bolus 1,000 mL (1,000 mLs Intravenous New Bag 1/23/22 1245)   ondansetron injection 4 mg (4 mg Intravenous Given 1/23/22 1257)   ketorolac injection 30 mg (30 mg Intravenous Given 1/23/22 1324)                       Clinical Impression:   Final diagnoses:  [N93.8] Dysfunctional uterine bleeding (Primary)  [N96] History of recurrent miscarriages          ED Disposition Condition    Discharge Stable        ED Prescriptions     Medication Sig Dispense Start Date End Date Auth. Provider    ibuprofen (ADVIL,MOTRIN) 800 MG tablet Take 1 tablet (800 mg total) by mouth 3 (three) times daily. 20 tablet 1/23/2022  TAZ Ryder        Follow-up Information     Follow up With Specialties Details Why Contact Info    Abdiel Ding DO Obstetrics and Gynecology Call   [address] [place] [address] [phone]             TAZ Ryder  01/23/22 9558     arthroscope was removed from the glenohumeral joint compartment and placed in the subacromial space as the arm was repositioned into 30 degrees of abduction, again with 9 lbs of distal support.  A standard posterosuperior portal was utilized, followed by an anterior superior portal using Wissinger janett technique.  A diagnostic evaluation of the subacromial space was then performed. The findings are as follows:    The coracoacromial ligament was quite scarred from previous open surgery. The bursa was thick and hypertrophic.  A lateral portal was created using needle localization. The arthrocare wand was introduced and the soft tissue was removed from beneath the acromion including incision, but not excision, of the coracoacromial ligament.  The lateral edge of the acromion was lightly trimmed. The arthroscope was then placed in the lateral portal and the jaky was placed in the posterior portal.  A subacromial decompression was then performed from posterior-to-anterior in a medial-to-lateral direction. The final acromial resection was 4 mm.  The acromioclavicular joint was debrided at this time including flattening of the distal clavicular inferior osteophyte.  Flatness was verified and hemostasis was obtained with electrocautery.  The arthroscope was then rotated down to further evaluate the rotator cuff.  The rotator cuff anatomy demonstrated a full-thickness tear.. The surgical repair then proceeded as follows.    The juxta-articular portion of the greater tuberosity of the humerus was very lightly decorticated to obtain some bleeding bone, but avoiding loss of cortical bone.  The most anatomical repair would include a side-to-side repair of the more medial tendon. Three suture tapes were used to perform this side-to-side repair.  This closed down the footprint considerably.  The tissue quality in this side-to-side component was satisfactory and reasonably well vascularized.    Next, two  holes were placed in  the greater tuberosity.  Bleeding in the bone was better than anticipated.  Each of the 5.5 mm anchors was placed with good purchase.  Each was loaded with a suture tape and a suture.  These were then passed individually and tied along the way to create for a full repair.    At the conclusion of the repair, the arm was abducted and rotated internally and externally to evaluate the integrity of the repair. This demonstrated good integrity.  However, I did note that there was a small spike of bone remaining in the acromion over the previous tear that likely was responsible for it. This was flattened out a bit further to give better clearance for the repair.  The subacromial space was irrigated and aspirated dry. The portals were closed with 4-0 Monocryl followed by Steri-Strips. The joint and subacromial space were infiltrated with a total of 30 mL of 0.5% Marcaine with Epinephrine. A sterile dressing was applied, followed by an Ultrasling. The patient was returned to the supine position, woken up, extubated and transferred to the Post Anesthesia Care Unit in stable condition.    My physician assistant for this procedure was Mariel Hadley PA-C. The role for the physician assistant in a complex arthroscopic surgical procedure is important for good surgical outcomes. There are many points during this procedure where more than two skilled hands are necessary. The tasks of the physician assistant include suture management, portal management, arthroscope management, and closing incisions. The physician assistant has these skills as well as maintaining the arthroscope in proper orientation, knowledge of the series of steps during the procedure. This is important in optimization of surgical outcomes. The role of the physician assistant is medically necessary for arthroscopic rotator cuff repair.    Note that my performance of an arthroscopic acromioplasty in combination with rotator cuff repairs is to allow for greater  space for scar tissue formation without impingement. This decision is based, in part, on a Multicenter, randomized controlled study by Torres et al demonstrating a statistically significant increased risk of re-operations in patients without acromioplasty with type 2 and type 3 acromion. (JBJS 104-A; 2022: xb5885-6). This evidence-based medical practice optimizes chances for success in my rotator cuff repairs.    Note that this was a revision rotator cuff repair after an initial repair in 2009.  There was exuberant scar formation which required 50% greater time and expertise to tease this scar tissue away from native tendon.  A number 22 modifier is used to indicate this for this portion of the procedure.          ROTATOR CUFF REHAB SCORE    Age/Medical Comorbidities:  58-year-old revision cuff repair   Score:  7/10  Tissue Quality:  Fair-good.  Repair was not under excessive tension.  However, there was a significant side-to-side component which may take a bit longer to heal.   Score:  6/10  Size/Extent of tear:  Supraspinatus and infraspinatus   Score:  7/10  Releases/Mobilization/Tension of repair:  No excessive tension on final repair.   Score:  8/10  Bone Quality:  Good   Score:  9/10  FINAL REHAB SCORE:  37/50.    COMMENTS:  This was a supraspinatus and infraspinatus revision repair.  There was a significant side-to-side component of the repair which may take a little bit longer to heal.  Begin physical therapy at 3-4 weeks.  Discontinue sling at 4-6 weeks.

## 2023-10-30 ENCOUNTER — OFFICE VISIT (OUTPATIENT)
Dept: OBSTETRICS AND GYNECOLOGY | Facility: CLINIC | Age: 28
End: 2023-10-30
Payer: MEDICAID

## 2023-10-30 VITALS
SYSTOLIC BLOOD PRESSURE: 124 MMHG | HEIGHT: 63 IN | WEIGHT: 198.31 LBS | DIASTOLIC BLOOD PRESSURE: 71 MMHG | HEART RATE: 73 BPM | RESPIRATION RATE: 16 BRPM | BODY MASS INDEX: 35.14 KG/M2

## 2023-10-30 DIAGNOSIS — F39 MOOD DISORDER: Primary | ICD-10-CM

## 2023-10-30 PROCEDURE — 3008F PR BODY MASS INDEX (BMI) DOCUMENTED: ICD-10-PCS | Mod: CPTII,,, | Performed by: STUDENT IN AN ORGANIZED HEALTH CARE EDUCATION/TRAINING PROGRAM

## 2023-10-30 PROCEDURE — 3074F PR MOST RECENT SYSTOLIC BLOOD PRESSURE < 130 MM HG: ICD-10-PCS | Mod: CPTII,,, | Performed by: STUDENT IN AN ORGANIZED HEALTH CARE EDUCATION/TRAINING PROGRAM

## 2023-10-30 PROCEDURE — 1159F PR MEDICATION LIST DOCUMENTED IN MEDICAL RECORD: ICD-10-PCS | Mod: CPTII,,, | Performed by: STUDENT IN AN ORGANIZED HEALTH CARE EDUCATION/TRAINING PROGRAM

## 2023-10-30 PROCEDURE — 99213 OFFICE O/P EST LOW 20 MIN: CPT | Mod: PBBFAC | Performed by: STUDENT IN AN ORGANIZED HEALTH CARE EDUCATION/TRAINING PROGRAM

## 2023-10-30 PROCEDURE — 3078F PR MOST RECENT DIASTOLIC BLOOD PRESSURE < 80 MM HG: ICD-10-PCS | Mod: CPTII,,, | Performed by: STUDENT IN AN ORGANIZED HEALTH CARE EDUCATION/TRAINING PROGRAM

## 2023-10-30 PROCEDURE — 99214 PR OFFICE/OUTPT VISIT, EST, LEVL IV, 30-39 MIN: ICD-10-PCS | Mod: S$PBB,,, | Performed by: STUDENT IN AN ORGANIZED HEALTH CARE EDUCATION/TRAINING PROGRAM

## 2023-10-30 PROCEDURE — 3008F BODY MASS INDEX DOCD: CPT | Mod: CPTII,,, | Performed by: STUDENT IN AN ORGANIZED HEALTH CARE EDUCATION/TRAINING PROGRAM

## 2023-10-30 PROCEDURE — 1159F MED LIST DOCD IN RCRD: CPT | Mod: CPTII,,, | Performed by: STUDENT IN AN ORGANIZED HEALTH CARE EDUCATION/TRAINING PROGRAM

## 2023-10-30 PROCEDURE — 3074F SYST BP LT 130 MM HG: CPT | Mod: CPTII,,, | Performed by: STUDENT IN AN ORGANIZED HEALTH CARE EDUCATION/TRAINING PROGRAM

## 2023-10-30 PROCEDURE — 3078F DIAST BP <80 MM HG: CPT | Mod: CPTII,,, | Performed by: STUDENT IN AN ORGANIZED HEALTH CARE EDUCATION/TRAINING PROGRAM

## 2023-10-30 PROCEDURE — 99214 OFFICE O/P EST MOD 30 MIN: CPT | Mod: S$PBB,,, | Performed by: STUDENT IN AN ORGANIZED HEALTH CARE EDUCATION/TRAINING PROGRAM

## 2023-10-30 RX ORDER — FLUOXETINE HYDROCHLORIDE 20 MG/1
20 CAPSULE ORAL DAILY
Qty: 30 CAPSULE | Refills: 11 | Status: SHIPPED | OUTPATIENT
Start: 2023-10-30 | End: 2024-10-29

## 2023-10-30 NOTE — PROGRESS NOTES
"Return Gyn Office Visit    Assessment/Plan  Problem List Items Addressed This Visit    None  Visit Diagnoses       Mood disorder    -  Primary              CC:   Chief Complaint   Patient presents with    Mood Swings     HPI:  28 y.o. who presents to office for hormone discussion.  Pt c/o irritability, and wants Zoloft increased to 200 mg.      Review of Systems - The following ROS was otherwise negative, except as noted in the HPI:  constitutional, respiratory, cardiovascular, gastrointestinal, genitourinary    Objective:  /71   Pulse 73   Resp 16   Ht 5' 3" (1.6 m)   Wt 89.9 kg (198 lb 4.8 oz)   LMP  (LMP Unknown)   Breastfeeding Yes   BMI 35.13 kg/m²   General: Alert, well appearing, no acute distress  Abdomen: Soft, nontender, nondistended   Extremities: No redness or tenderness, neg Tim's sign  Osteopathic: no TART changes     Plan:  Change to Prozac 20 mg daily.   Follow-up PRN.  "

## 2023-12-02 NOTE — ASSESSMENT & PLAN NOTE
Continue prenatal care    
  Obstetric precautions  
  noted  
False labor  
No abnormalities noted

## 2024-03-22 NOTE — TRANSFER OF CARE
"Anesthesia Transfer of Care Note    Patient: Rebekah Gregg    Procedure(s) Performed: Procedure(s) (LRB):  XI ROBOTIC HYSTERECTOMY (N/A)  CYSTOSCOPY (N/A)    Patient location: PACU    Anesthesia Type: general    Transport from OR: Transported from OR on 6-10 L/min O2 by face mask with adequate spontaneous ventilation    Post pain: adequate analgesia    Post assessment: no apparent anesthetic complications    Post vital signs: stable    Level of consciousness: responds to stimulation and sedated    Nausea/Vomiting: no nausea/vomiting    Complications: none    Transfer of care protocol was followed      Last vitals:   Visit Vitals  /61 (BP Location: Left arm, Patient Position: Lying)   Pulse 87   Temp 37.2 °C (99 °F) (Oral)   Resp 13   Ht 5' 3" (1.6 m)   Wt 82.6 kg (182 lb)   LMP  (LMP Unknown)   SpO2 95%   Breastfeeding Yes   BMI 32.24 kg/m²     " Case Management Discharge Note                Selected Continued Care - Discharged on 3/22/2024 Admission date: 3/19/2024 - Discharge disposition: Home-Health Care Svc      Destination    No services have been selected for the patient.                Durable Medical Equipment    No services have been selected for the patient.                Dialysis/Infusion    No services have been selected for the patient.                Home Medical Care Coordination complete. Patient indicates having no preference.      Service Provider Selected Services Address Phone Fax Patient Preferred    [facility] - [facility] [id number] [phone] --              Therapy    No services have been selected for the patient.                Community Resources    No services have been selected for the patient.                Community & DME    No services have been selected for the patient.                    Transportation Services  Private: Car    Final Discharge Disposition Code: 06 - home with home health care

## 2025-03-05 ENCOUNTER — TELEPHONE (OUTPATIENT)
Dept: OBSTETRICS AND GYNECOLOGY | Facility: CLINIC | Age: 30
End: 2025-03-05
Payer: MEDICAID

## 2025-03-05 NOTE — TELEPHONE ENCOUNTER
----- Message from Mary Kay sent at 3/5/2025  1:18 PM CST -----  Regarding: annual appointment  Who Called: Rebekah Shell is requesting assistance/information from provider's office.Symptoms (please be specific): wants an annual appointment but it won't let me schedule her  Preferred Method of Contact: Phone CallPatient's Preferred Phone Number on File: [phone] Best Call Back Number, if different:Additional Information:

## (undated) DEVICE — TAPE DRAPE STRIP CLSR 4.5X24IN

## (undated) DEVICE — SUT V-LOC GRN 30CM 12IN 2-0

## (undated) DEVICE — GLOVE 7.0 PROTEXIS PI BLUE

## (undated) DEVICE — SCISSOR HOT SHEARS XI

## (undated) DEVICE — NEEDLE FILTER 19GAX1

## (undated) DEVICE — WARMER BLUE HEAT SCOPE 3-12MM

## (undated) DEVICE — MATRIX FLOSEAL HEMOSTATIC 10ML

## (undated) DEVICE — GLOVE PROTEXIS PI SYN SURG 7

## (undated) DEVICE — SOL NACL IRR 3000ML

## (undated) DEVICE — SOL NACL IRR 1000ML BTL

## (undated) DEVICE — OCCLUDER COLPO-PNEUMO STERILE

## (undated) DEVICE — GLOVE SURGICAL PROTEXIS PI BLUE SIZE 7.0

## (undated) DEVICE — PAD CURAD NONADH 3X4IN

## (undated) DEVICE — COVER TIP CURVED SCISSORS XI

## (undated) DEVICE — GOWN POLY REINF BRTH SLV XL

## (undated) DEVICE — SYR 10CC LUER LOCK

## (undated) DEVICE — SET IRRIG CYSTO/BLADDER 78IN

## (undated) DEVICE — TROCAR ENDO Z THREAD KII 5X100

## (undated) DEVICE — NDL DRIVE LARGE XI

## (undated) DEVICE — APPLICATOR CHLORAPREP ORN 26ML

## (undated) DEVICE — HANDPIECE MINERVA DISPOSABLE

## (undated) DEVICE — CDS GYN LAPAROSCOPY

## (undated) DEVICE — SOL IRRIGATION SALINE 3000ML BAG

## (undated) DEVICE — GLOVE SURGICAL PROTEXIS PI SIZE 6.5

## (undated) DEVICE — KIT ROBOTIC RUSH

## (undated) DEVICE — Device

## (undated) DEVICE — SEAL LENS SCOPE MYOSURE

## (undated) DEVICE — IRRIGATOR ENDOSCOPY DISP.

## (undated) DEVICE — GOWN NONREINF SET-IN SLV 2XL

## (undated) DEVICE — ADHESIVE DERMABOND ADVANCED

## (undated) DEVICE — KIT GYN LAPAROSCOPY RUSH

## (undated) DEVICE — GOWN SURGICAL SMARTGOWN LEVEL 4 / EXTRA LARGE STERILE

## (undated) DEVICE — TROCAR SEPARATOR SMOOTH 8MM

## (undated) DEVICE — WARMER SCOPE DISP

## (undated) DEVICE — DRAPE ARM DAVINCI XI

## (undated) DEVICE — SUT MONOCYRL 4-0 PS2 UND

## (undated) DEVICE — TROCAR BLADELESS Z-THREAD 5MM X 100MM

## (undated) DEVICE — ELECTRODE BALL LAP SPLIT 33CM

## (undated) DEVICE — SUT VICRYL PLUS 1 CTX 36IN

## (undated) DEVICE — GLOVE PROTEXIS PI SYN SURG 6.5

## (undated) DEVICE — TUBE SUCTION MEDI-VAC STERILE

## (undated) DEVICE — FLOSEAL 10ML

## (undated) DEVICE — SOL IRR WATER 2000ML STERILE

## (undated) DEVICE — TIP RUMI BLUE DISPOSABLE 5/BX

## (undated) DEVICE — NEEDLE VARIES INSUFFLAT DISP 14G X 150

## (undated) DEVICE — OBTURATOR BLADELESS 8MM XI CLR

## (undated) DEVICE — GLOVE SURGICAL PROTEXIS PI BLUE SIZE 6.5

## (undated) DEVICE — TUBING INSUFFLATION HEATED

## (undated) DEVICE — APPLICATOR FLOSEAL ENDO 35CM

## (undated) DEVICE — DISSECTOR KITTNER ENDO BLUNT DISP

## (undated) DEVICE — PENCIL ELECTROSURG HOLST W/BLD

## (undated) DEVICE — FORCEP FENESTRATED BIPOLAR

## (undated) DEVICE — GLOVE 6.5 PROTEXIS PI BLUE

## (undated) DEVICE — TROCAR SLEEVE Z-THREAD 5MM X 100MM

## (undated) DEVICE — CANNULA LAP SEAL Z THRD 5X100

## (undated) DEVICE — SEAL UNIVERSAL 5MM-8MM XI

## (undated) DEVICE — DISSECTOR KITTNER 5MM T 45CM S

## (undated) DEVICE — EVACUATOR KIT SMOKE PLUME AWAY

## (undated) DEVICE — DRESSING TELFA MINI ISLAND LF NONADH